# Patient Record
Sex: FEMALE | Race: WHITE | NOT HISPANIC OR LATINO | Employment: OTHER | URBAN - METROPOLITAN AREA
[De-identification: names, ages, dates, MRNs, and addresses within clinical notes are randomized per-mention and may not be internally consistent; named-entity substitution may affect disease eponyms.]

---

## 2017-01-02 ENCOUNTER — HOSPITAL ENCOUNTER (OUTPATIENT)
Dept: LAB | Facility: HOSPITAL | Age: 67
Discharge: HOME | End: 2017-01-02
Attending: ORTHOPAEDIC SURGERY | Admitting: ORTHOPAEDIC SURGERY

## 2017-01-02 LAB
BASOPHILS # BLD AUTO: 0.1 10*3/UL (ref 0–0.2)
BASOPHILS NFR BLD AUTO: 1 % (ref 0–2)
DIFFERENTIAL METHOD BLD: (no result)
EOSINOPHIL # BLD AUTO: 0.1 10*3/UL (ref 0–0.3)
EOSINOPHIL # BLD AUTO: 2 % (ref 0–3)
ERYTHROCYTE [DISTWIDTH] IN BLOOD BY AUTOMATED COUNT: 12.8 % (ref 11.5–14.5)
HCT VFR BLD AUTO: 39 % (ref 35–49)
HGB BLD-MCNC: 13 G/DL (ref 12–15)
LYMPHOCYTES # BLD AUTO: 1.3 10*3/UL (ref 0.8–4.8)
LYMPHOCYTES NFR BLD AUTO: 20 % (ref 18–42)
MCH RBC QN AUTO: 30.1 PG (ref 26–32)
MCHC RBC AUTO-ENTMCNC: 33.3 G/DL (ref 32–36)
MCV RBC AUTO: 90.4 FL (ref 80–94)
MONOCYTES # BLD AUTO: 0.7 10*3/UL (ref 0.1–1.3)
MONOCYTES NFR BLD AUTO: 11 % (ref 2–11)
NEUTROPHILS # BLD AUTO: 4.6 10*3/UL (ref 2.3–8.6)
NEUTROPHILS NFR BLD AUTO: 66 % (ref 50–75)
NRBC BLD AUTO-RTO: 0 /100{WBCS}
NRBC/RBC NFR BLD MANUAL: 0 10*3/UL
PLATELET # BLD AUTO: 265 10*3/UL (ref 150–450)
PMV BLD AUTO: 7.9 FL (ref 7.4–10.4)
RBC # BLD AUTO: 4.32 10*6/UL (ref 4–5.4)
WBC # BLD AUTO: 6.8 10*3/UL (ref 4.5–11.5)

## 2017-01-17 ENCOUNTER — HOSPITAL ENCOUNTER (OUTPATIENT)
Dept: ORTHOPEDIC SURGERY | Facility: CLINIC | Age: 67
Discharge: HOME | End: 2017-01-17
Attending: ORTHOPAEDIC SURGERY | Admitting: ORTHOPAEDIC SURGERY

## 2017-01-23 ENCOUNTER — HOSPITAL ENCOUNTER (OUTPATIENT)
Dept: PHYSICAL THERAPY | Facility: HOSPITAL | Age: 67
Setting detail: RECURRING SERIES
Discharge: HOME | End: 2017-02-14
Attending: ORTHOPAEDIC SURGERY | Admitting: ORTHOPAEDIC SURGERY

## 2017-02-10 ENCOUNTER — OFFICE (AMBULATORY)
Dept: URBAN - METROPOLITAN AREA CLINIC 64 | Facility: CLINIC | Age: 67
End: 2017-02-10

## 2017-02-10 VITALS
HEART RATE: 72 BPM | SYSTOLIC BLOOD PRESSURE: 131 MMHG | WEIGHT: 148 LBS | DIASTOLIC BLOOD PRESSURE: 67 MMHG | HEIGHT: 64 IN

## 2017-02-10 DIAGNOSIS — R19.5 OTHER FECAL ABNORMALITIES: ICD-10-CM

## 2017-02-10 PROCEDURE — 99213 OFFICE O/P EST LOW 20 MIN: CPT | Performed by: NURSE PRACTITIONER

## 2017-03-28 ENCOUNTER — HOSPITAL ENCOUNTER (OUTPATIENT)
Dept: CARDIOLOGY | Facility: HOSPITAL | Age: 67
Discharge: HOME | End: 2017-03-28
Attending: ORTHOPAEDIC SURGERY | Admitting: ORTHOPAEDIC SURGERY

## 2018-01-30 ENCOUNTER — HOSPITAL ENCOUNTER (OUTPATIENT)
Dept: ORTHOPEDIC SURGERY | Facility: CLINIC | Age: 68
Discharge: HOME | End: 2018-01-30
Attending: ORTHOPAEDIC SURGERY | Admitting: ORTHOPAEDIC SURGERY

## 2018-03-01 ENCOUNTER — OFFICE VISIT (OUTPATIENT)
Dept: ORTHOPEDIC SURGERY | Facility: CLINIC | Age: 68
End: 2018-03-01

## 2018-03-01 VITALS — WEIGHT: 153.4 LBS | BODY MASS INDEX: 27.18 KG/M2 | HEIGHT: 63 IN | TEMPERATURE: 97.9 F

## 2018-03-01 DIAGNOSIS — M25.562 CHRONIC PAIN OF LEFT KNEE: Primary | ICD-10-CM

## 2018-03-01 DIAGNOSIS — G89.29 CHRONIC PAIN OF LEFT KNEE: Primary | ICD-10-CM

## 2018-03-01 DIAGNOSIS — Z96.653 HISTORY OF TOTAL KNEE ARTHROPLASTY, BILATERAL: ICD-10-CM

## 2018-03-01 PROCEDURE — 99203 OFFICE O/P NEW LOW 30 MIN: CPT | Performed by: ORTHOPAEDIC SURGERY

## 2018-03-01 PROCEDURE — 73562 X-RAY EXAM OF KNEE 3: CPT | Performed by: ORTHOPAEDIC SURGERY

## 2018-03-01 RX ORDER — NICOTINE POLACRILEX 2 MG
GUM BUCCAL DAILY
COMMUNITY
End: 2021-03-02 | Stop reason: HOSPADM

## 2018-03-01 RX ORDER — PAROXETINE HYDROCHLORIDE 20 MG/1
20 TABLET, FILM COATED ORAL NIGHTLY
COMMUNITY

## 2018-03-01 RX ORDER — SIMVASTATIN 10 MG
20 TABLET ORAL NIGHTLY
Refills: 5 | Status: ON HOLD | COMMUNITY
Start: 2018-01-31 | End: 2022-01-01

## 2018-03-01 RX ORDER — IBUPROFEN 200 MG
200 TABLET ORAL EVERY 6 HOURS PRN
COMMUNITY
End: 2021-03-02 | Stop reason: HOSPADM

## 2018-03-01 RX ORDER — ZOLPIDEM TARTRATE 10 MG/1
10 TABLET ORAL NIGHTLY PRN
Refills: 3 | Status: ON HOLD | COMMUNITY
Start: 2018-02-07 | End: 2022-01-01

## 2018-03-01 NOTE — PROGRESS NOTES
"Patient: Natalie Yin  YOB: 1950 68 y.o. female  Medical Record Number: [id number]    Chief Complaints:   Chief Complaint   Patient presents with   • Left Knee - Pain, Establish Care       History of Present Illness:Natalie Yin is a 68 y.o. female who presents with Complaints of left knee pain.  She had a left total knee replacement performed by  in [facility].  This was done about 14 months ago.  Following surgery she had pain in that now persists 14 months later.  She states the alignment feel slightly off in comparison to her the other knee which was replaced 5 years ago.    Allergies: No Known Allergies    Medications:   Current Outpatient Prescriptions   Medication Sig Dispense Refill   • Biotin 1 MG capsule Take  by mouth.     • ibuprofen (ADVIL,MOTRIN) 200 MG tablet Take 200 mg by mouth Every 6 (Six) Hours As Needed for Mild Pain .     • PARoxetine (PAXIL) 10 MG tablet Take 10 mg by mouth Every Morning.     • simvastatin (ZOCOR) 10 MG tablet TK 1 T PO QD IN THE HELENE  5   • zolpidem (AMBIEN) 10 MG tablet TK 1 T PO QD HS  3     No current facility-administered medications for this visit.          The following portions of the patient's history were reviewed and updated as appropriate: allergies, current medications, past family history, past medical history, past social history, past surgical history and problem list.    Review of Systems:   A 14 point review of systems was performed. All systems negative except pertinent positives/negative listed in HPI above    Physical Exam:   Vitals:    03/01/18 1101   Temp: 97.9 °F (36.6 °C)   Weight: 69.6 kg (153 lb 6.4 oz)   Height: 160 cm (63\")       General: A and O x 3, ASA, NAD    SCLERA:    Normal    DENTITION:   Normal  Left knee shows a well-healed surgical incision she stands about 10° valgus alignment.  There is no joint effusion she has mild laxity with a varus directed stress in extension.  There is no evidence of flexion extension. "  The patella tracks midline.  Calf is soft she is intact to light touch with palpable distal pulses quad strength is full.       Radiology:  Xrays 3views left knee (ap,lateral, sunrise) were ordered and reviewed for evaluation of knee pain demonstrating a left total knee replacement appears to be in slight valgus alignment.  This is a standing AP view and I do not have access to a full length view to fully determine alignment.  She appears to have slight excess valgus alignment through the femoral component rather than the tibial component based on this limited view.  There is no evidence of loosening.  The components appear appropriately sized.  There are no previous films for comparison.    Assessment/Plan: Left total knee with slight excessive valgus alignment which is contributing to feelings of instability and discomfort.  I recommended a medial heel wedge.  I explained her that the absolute last resort would be revision like of the femoral component but at this point I don't think either of us are particularly interested in that.  I be happy to see her back at any point she should try the heel wedge and strengthen her quad  muscles as much as possible      Wing Ann MD  3/1/2018

## 2018-07-03 ENCOUNTER — HOSPITAL ENCOUNTER (OUTPATIENT)
Dept: ORTHOPEDIC SURGERY | Facility: CLINIC | Age: 68
Discharge: HOME | End: 2018-07-03
Attending: ORTHOPAEDIC SURGERY | Admitting: ORTHOPAEDIC SURGERY

## 2018-07-10 ENCOUNTER — HOSPITAL ENCOUNTER (OUTPATIENT)
Dept: OTHER | Facility: HOSPITAL | Age: 68
Discharge: HOME | End: 2018-07-10
Attending: ORTHOPAEDIC SURGERY | Admitting: ORTHOPAEDIC SURGERY

## 2018-07-10 LAB
CRP SERPL-MCNC: 0.43 MG/DL (ref 0–0.7)
ERYTHROCYTE [SEDIMENTATION RATE] IN BLOOD BY WESTERGREN METHOD: 25 MM/HR (ref 0–30)

## 2019-01-22 ENCOUNTER — HOSPITAL ENCOUNTER (OUTPATIENT)
Dept: LAB | Facility: HOSPITAL | Age: 69
Discharge: HOME | End: 2019-01-22
Attending: DERMATOLOGY | Admitting: DERMATOLOGY

## 2019-01-22 LAB
HCV AB SER DONR QL: NORMAL
HCV AB SER DONR QL: NORMAL

## 2020-10-29 ENCOUNTER — ON CAMPUS - OUTPATIENT (AMBULATORY)
Dept: URBAN - METROPOLITAN AREA HOSPITAL 2 | Facility: HOSPITAL | Age: 70
End: 2020-10-29

## 2020-10-29 ENCOUNTER — OFFICE (AMBULATORY)
Dept: URBAN - METROPOLITAN AREA PATHOLOGY 4 | Facility: PATHOLOGY | Age: 70
End: 2020-10-29

## 2020-10-29 VITALS
SYSTOLIC BLOOD PRESSURE: 134 MMHG | SYSTOLIC BLOOD PRESSURE: 136 MMHG | SYSTOLIC BLOOD PRESSURE: 118 MMHG | OXYGEN SATURATION: 97 % | WEIGHT: 148 LBS | DIASTOLIC BLOOD PRESSURE: 67 MMHG | HEART RATE: 87 BPM | OXYGEN SATURATION: 96 % | SYSTOLIC BLOOD PRESSURE: 129 MMHG | DIASTOLIC BLOOD PRESSURE: 104 MMHG | RESPIRATION RATE: 18 BRPM | DIASTOLIC BLOOD PRESSURE: 57 MMHG | HEART RATE: 63 BPM | TEMPERATURE: 97.1 F | OXYGEN SATURATION: 95 % | SYSTOLIC BLOOD PRESSURE: 80 MMHG | DIASTOLIC BLOOD PRESSURE: 56 MMHG | SYSTOLIC BLOOD PRESSURE: 107 MMHG | SYSTOLIC BLOOD PRESSURE: 151 MMHG | RESPIRATION RATE: 16 BRPM | HEART RATE: 74 BPM | HEART RATE: 67 BPM | SYSTOLIC BLOOD PRESSURE: 106 MMHG | DIASTOLIC BLOOD PRESSURE: 86 MMHG | HEIGHT: 64 IN | HEART RATE: 64 BPM | DIASTOLIC BLOOD PRESSURE: 41 MMHG | DIASTOLIC BLOOD PRESSURE: 53 MMHG | SYSTOLIC BLOOD PRESSURE: 117 MMHG | HEART RATE: 65 BPM | DIASTOLIC BLOOD PRESSURE: 55 MMHG | HEART RATE: 61 BPM | OXYGEN SATURATION: 100 %

## 2020-10-29 DIAGNOSIS — K57.30 DIVERTICULOSIS OF LARGE INTESTINE WITHOUT PERFORATION OR ABS: ICD-10-CM

## 2020-10-29 DIAGNOSIS — D12.4 BENIGN NEOPLASM OF DESCENDING COLON: ICD-10-CM

## 2020-10-29 DIAGNOSIS — R10.12 LEFT UPPER QUADRANT PAIN: ICD-10-CM

## 2020-10-29 DIAGNOSIS — K62.1 RECTAL POLYP: ICD-10-CM

## 2020-10-29 DIAGNOSIS — R13.19 OTHER DYSPHAGIA: ICD-10-CM

## 2020-10-29 DIAGNOSIS — Z86.010 PERSONAL HISTORY OF COLONIC POLYPS: ICD-10-CM

## 2020-10-29 DIAGNOSIS — K64.8 OTHER HEMORRHOIDS: ICD-10-CM

## 2020-10-29 DIAGNOSIS — K44.9 DIAPHRAGMATIC HERNIA WITHOUT OBSTRUCTION OR GANGRENE: ICD-10-CM

## 2020-10-29 PROBLEM — K63.5 POLYP OF COLON: Status: ACTIVE | Noted: 2020-10-29

## 2020-10-29 LAB
GI HISTOLOGY: A. UNSPECIFIED: (no result)
GI HISTOLOGY: B. UNSPECIFIED: (no result)
GI HISTOLOGY: PDF REPORT: (no result)

## 2020-10-29 PROCEDURE — 43450 DILATE ESOPHAGUS 1/MULT PASS: CPT | Performed by: INTERNAL MEDICINE

## 2020-10-29 PROCEDURE — 45380 COLONOSCOPY AND BIOPSY: CPT | Mod: PT | Performed by: INTERNAL MEDICINE

## 2020-10-29 PROCEDURE — 43235 EGD DIAGNOSTIC BRUSH WASH: CPT | Performed by: INTERNAL MEDICINE

## 2020-10-29 PROCEDURE — 88305 TISSUE EXAM BY PATHOLOGIST: CPT | Mod: 26 | Performed by: INTERNAL MEDICINE

## 2020-10-29 RX ORDER — OMEPRAZOLE 40 MG/1
40 CAPSULE, DELAYED RELEASE ORAL
Qty: 90 | Refills: 3 | Status: ACTIVE
Start: 2020-10-29

## 2021-01-01 ENCOUNTER — HOSPITAL ENCOUNTER (OUTPATIENT)
Facility: HOSPITAL | Age: 71
Setting detail: HOSPITAL OUTPATIENT SURGERY
Discharge: HOME | End: 2021-09-23
Attending: OPHTHALMOLOGY | Admitting: OPHTHALMOLOGY

## 2021-01-01 ENCOUNTER — ANESTHESIA (OUTPATIENT)
Dept: PERIOP | Facility: HOSPITAL | Age: 71
End: 2021-01-01

## 2021-01-01 ENCOUNTER — PRE-ADMISSION TESTING (OUTPATIENT)
Dept: PREADMISSION TESTING | Facility: HOSPITAL | Age: 71
End: 2021-01-01

## 2021-01-01 ENCOUNTER — ANESTHESIA EVENT (OUTPATIENT)
Dept: PERIOP | Facility: HOSPITAL | Age: 71
End: 2021-01-01

## 2021-01-01 VITALS
BODY MASS INDEX: 24.41 KG/M2 | HEIGHT: 64 IN | SYSTOLIC BLOOD PRESSURE: 165 MMHG | WEIGHT: 143 LBS | RESPIRATION RATE: 16 BRPM | TEMPERATURE: 97.4 F | DIASTOLIC BLOOD PRESSURE: 72 MMHG | OXYGEN SATURATION: 98 % | HEART RATE: 67 BPM

## 2021-01-01 VITALS
TEMPERATURE: 97.5 F | OXYGEN SATURATION: 97 % | DIASTOLIC BLOOD PRESSURE: 91 MMHG | RESPIRATION RATE: 18 BRPM | SYSTOLIC BLOOD PRESSURE: 154 MMHG | HEART RATE: 72 BPM

## 2021-01-01 LAB
ANION GAP SERPL CALCULATED.3IONS-SCNC: 10.6 MMOL/L (ref 5–15)
BUN SERPL-MCNC: 16 MG/DL (ref 8–23)
BUN/CREAT SERPL: 25.4 (ref 7–25)
CALCIUM SPEC-SCNC: 9.9 MG/DL (ref 8.6–10.5)
CHLORIDE SERPL-SCNC: 102 MMOL/L (ref 98–107)
CO2 SERPL-SCNC: 24.4 MMOL/L (ref 22–29)
CREAT SERPL-MCNC: 0.63 MG/DL (ref 0.57–1)
DEPRECATED RDW RBC AUTO: 41.1 FL (ref 37–54)
ERYTHROCYTE [DISTWIDTH] IN BLOOD BY AUTOMATED COUNT: 12.7 % (ref 12.3–15.4)
GFR SERPL CREATININE-BSD FRML MDRD: 94 ML/MIN/1.73
GLUCOSE SERPL-MCNC: 105 MG/DL (ref 65–99)
HCT VFR BLD AUTO: 37 % (ref 34–46.6)
HGB BLD-MCNC: 12.1 G/DL (ref 12–15.9)
MCH RBC QN AUTO: 29.2 PG (ref 26.6–33)
MCHC RBC AUTO-ENTMCNC: 32.7 G/DL (ref 31.5–35.7)
MCV RBC AUTO: 89.2 FL (ref 79–97)
PLATELET # BLD AUTO: 273 10*3/MM3 (ref 140–450)
PMV BLD AUTO: 9.8 FL (ref 6–12)
POTASSIUM SERPL-SCNC: 4.3 MMOL/L (ref 3.5–5.2)
QT INTERVAL: 441 MS
RBC # BLD AUTO: 4.15 10*6/MM3 (ref 3.77–5.28)
SARS-COV-2 ORF1AB RESP QL NAA+PROBE: NOT DETECTED
SODIUM SERPL-SCNC: 137 MMOL/L (ref 136–145)
WBC # BLD AUTO: 5.53 10*3/MM3 (ref 3.4–10.8)

## 2021-01-01 PROCEDURE — C9803 HOPD COVID-19 SPEC COLLECT: HCPCS | Performed by: NURSE PRACTITIONER

## 2021-01-01 PROCEDURE — U0005 INFEC AGEN DETEC AMPLI PROBE: HCPCS | Performed by: NURSE PRACTITIONER

## 2021-01-01 PROCEDURE — 25010000002 FENTANYL CITRATE (PF) 50 MCG/ML SOLUTION: Performed by: NURSE ANESTHETIST, CERTIFIED REGISTERED

## 2021-01-01 PROCEDURE — 85027 COMPLETE CBC AUTOMATED: CPT

## 2021-01-01 PROCEDURE — 25010000002 ONDANSETRON PER 1 MG: Performed by: ANESTHESIOLOGY

## 2021-01-01 PROCEDURE — 93010 ELECTROCARDIOGRAM REPORT: CPT | Performed by: INTERNAL MEDICINE

## 2021-01-01 PROCEDURE — U0004 COV-19 TEST NON-CDC HGH THRU: HCPCS | Performed by: NURSE PRACTITIONER

## 2021-01-01 PROCEDURE — 80048 BASIC METABOLIC PNL TOTAL CA: CPT

## 2021-01-01 PROCEDURE — 25010000002 PHENYLEPHRINE PER 1 ML: Performed by: NURSE ANESTHETIST, CERTIFIED REGISTERED

## 2021-01-01 PROCEDURE — 25010000002 ONDANSETRON PER 1 MG: Performed by: NURSE ANESTHETIST, CERTIFIED REGISTERED

## 2021-01-01 PROCEDURE — 25010000002 DEXAMETHASONE PER 1 MG: Performed by: NURSE ANESTHETIST, CERTIFIED REGISTERED

## 2021-01-01 PROCEDURE — 93005 ELECTROCARDIOGRAM TRACING: CPT

## 2021-01-01 PROCEDURE — 36415 COLL VENOUS BLD VENIPUNCTURE: CPT

## 2021-01-01 PROCEDURE — 25010000002 PROPOFOL 10 MG/ML EMULSION: Performed by: NURSE ANESTHETIST, CERTIFIED REGISTERED

## 2021-01-01 RX ORDER — PROPOFOL 10 MG/ML
VIAL (ML) INTRAVENOUS AS NEEDED
Status: DISCONTINUED | OUTPATIENT
Start: 2021-01-01 | End: 2021-01-01 | Stop reason: SURG

## 2021-01-01 RX ORDER — LABETALOL HYDROCHLORIDE 5 MG/ML
5 INJECTION, SOLUTION INTRAVENOUS
Status: DISCONTINUED | OUTPATIENT
Start: 2021-01-01 | End: 2021-01-01 | Stop reason: HOSPADM

## 2021-01-01 RX ORDER — EPHEDRINE SULFATE 50 MG/ML
5 INJECTION, SOLUTION INTRAVENOUS ONCE AS NEEDED
Status: DISCONTINUED | OUTPATIENT
Start: 2021-01-01 | End: 2021-01-01 | Stop reason: HOSPADM

## 2021-01-01 RX ORDER — PROMETHAZINE HYDROCHLORIDE 25 MG/1
25 TABLET ORAL ONCE AS NEEDED
Status: DISCONTINUED | OUTPATIENT
Start: 2021-01-01 | End: 2021-01-01 | Stop reason: HOSPADM

## 2021-01-01 RX ORDER — ACETAMINOPHEN 325 MG/1
650 TABLET ORAL ONCE AS NEEDED
Status: DISCONTINUED | OUTPATIENT
Start: 2021-01-01 | End: 2021-01-01 | Stop reason: HOSPADM

## 2021-01-01 RX ORDER — ONDANSETRON 2 MG/ML
4 INJECTION INTRAMUSCULAR; INTRAVENOUS ONCE AS NEEDED
Status: DISCONTINUED | OUTPATIENT
Start: 2021-01-01 | End: 2021-01-01 | Stop reason: HOSPADM

## 2021-01-01 RX ORDER — LIDOCAINE HYDROCHLORIDE 10 MG/ML
0.5 INJECTION, SOLUTION EPIDURAL; INFILTRATION; INTRACAUDAL; PERINEURAL ONCE AS NEEDED
Status: DISCONTINUED | OUTPATIENT
Start: 2021-01-01 | End: 2021-01-01 | Stop reason: HOSPADM

## 2021-01-01 RX ORDER — SODIUM CHLORIDE 0.9 % (FLUSH) 0.9 %
3 SYRINGE (ML) INJECTION EVERY 12 HOURS SCHEDULED
Status: DISCONTINUED | OUTPATIENT
Start: 2021-01-01 | End: 2021-01-01 | Stop reason: HOSPADM

## 2021-01-01 RX ORDER — ONDANSETRON 2 MG/ML
INJECTION INTRAMUSCULAR; INTRAVENOUS AS NEEDED
Status: DISCONTINUED | OUTPATIENT
Start: 2021-01-01 | End: 2021-01-01 | Stop reason: SURG

## 2021-01-01 RX ORDER — HYDROCODONE BITARTRATE AND ACETAMINOPHEN 7.5; 325 MG/1; MG/1
1 TABLET ORAL ONCE AS NEEDED
Status: COMPLETED | OUTPATIENT
Start: 2021-01-01 | End: 2021-01-01

## 2021-01-01 RX ORDER — MAGNESIUM HYDROXIDE 1200 MG/15ML
LIQUID ORAL AS NEEDED
Status: DISCONTINUED | OUTPATIENT
Start: 2021-01-01 | End: 2021-01-01 | Stop reason: HOSPADM

## 2021-01-01 RX ORDER — SODIUM CHLORIDE, SODIUM LACTATE, POTASSIUM CHLORIDE, CALCIUM CHLORIDE 600; 310; 30; 20 MG/100ML; MG/100ML; MG/100ML; MG/100ML
9 INJECTION, SOLUTION INTRAVENOUS CONTINUOUS
Status: DISCONTINUED | OUTPATIENT
Start: 2021-01-01 | End: 2021-01-01 | Stop reason: HOSPADM

## 2021-01-01 RX ORDER — DIPHENHYDRAMINE HYDROCHLORIDE 50 MG/ML
12.5 INJECTION INTRAMUSCULAR; INTRAVENOUS
Status: DISCONTINUED | OUTPATIENT
Start: 2021-01-01 | End: 2021-01-01 | Stop reason: HOSPADM

## 2021-01-01 RX ORDER — DEXAMETHASONE SODIUM PHOSPHATE 10 MG/ML
INJECTION INTRAMUSCULAR; INTRAVENOUS AS NEEDED
Status: DISCONTINUED | OUTPATIENT
Start: 2021-01-01 | End: 2021-01-01 | Stop reason: SURG

## 2021-01-01 RX ORDER — ERYTHROMYCIN 5 MG/G
OINTMENT OPHTHALMIC 2 TIMES DAILY
Qty: 3.5 G | Refills: 1 | Status: ON HOLD | OUTPATIENT
Start: 2021-01-01 | End: 2022-01-01

## 2021-01-01 RX ORDER — SODIUM CHLORIDE 0.9 % (FLUSH) 0.9 %
3-10 SYRINGE (ML) INJECTION AS NEEDED
Status: DISCONTINUED | OUTPATIENT
Start: 2021-01-01 | End: 2021-01-01 | Stop reason: HOSPADM

## 2021-01-01 RX ORDER — HYDRALAZINE HYDROCHLORIDE 20 MG/ML
5 INJECTION INTRAMUSCULAR; INTRAVENOUS
Status: DISCONTINUED | OUTPATIENT
Start: 2021-01-01 | End: 2021-01-01 | Stop reason: HOSPADM

## 2021-01-01 RX ORDER — HYDROMORPHONE HYDROCHLORIDE 1 MG/ML
0.5 INJECTION, SOLUTION INTRAMUSCULAR; INTRAVENOUS; SUBCUTANEOUS
Status: DISCONTINUED | OUTPATIENT
Start: 2021-01-01 | End: 2021-01-01 | Stop reason: HOSPADM

## 2021-01-01 RX ORDER — NALOXONE HCL 0.4 MG/ML
0.2 VIAL (ML) INJECTION AS NEEDED
Status: DISCONTINUED | OUTPATIENT
Start: 2021-01-01 | End: 2021-01-01 | Stop reason: HOSPADM

## 2021-01-01 RX ORDER — ERYTHROMYCIN 5 MG/G
OINTMENT OPHTHALMIC AS NEEDED
Status: DISCONTINUED | OUTPATIENT
Start: 2021-01-01 | End: 2021-01-01 | Stop reason: HOSPADM

## 2021-01-01 RX ORDER — HYDROCODONE BITARTRATE AND ACETAMINOPHEN 5; 325 MG/1; MG/1
1 TABLET ORAL EVERY 6 HOURS PRN
Qty: 15 TABLET | Refills: 0 | Status: SHIPPED | OUTPATIENT
Start: 2021-01-01 | End: 2021-01-01

## 2021-01-01 RX ORDER — FENTANYL CITRATE 50 UG/ML
INJECTION, SOLUTION INTRAMUSCULAR; INTRAVENOUS AS NEEDED
Status: DISCONTINUED | OUTPATIENT
Start: 2021-01-01 | End: 2021-01-01 | Stop reason: SURG

## 2021-01-01 RX ORDER — OXYCODONE AND ACETAMINOPHEN 10; 325 MG/1; MG/1
1 TABLET ORAL EVERY 4 HOURS PRN
Status: DISCONTINUED | OUTPATIENT
Start: 2021-01-01 | End: 2021-01-01 | Stop reason: HOSPADM

## 2021-01-01 RX ORDER — EPHEDRINE SULFATE 50 MG/ML
INJECTION, SOLUTION INTRAVENOUS AS NEEDED
Status: DISCONTINUED | OUTPATIENT
Start: 2021-01-01 | End: 2021-01-01 | Stop reason: SURG

## 2021-01-01 RX ORDER — PROMETHAZINE HYDROCHLORIDE 25 MG/1
25 SUPPOSITORY RECTAL ONCE AS NEEDED
Status: DISCONTINUED | OUTPATIENT
Start: 2021-01-01 | End: 2021-01-01 | Stop reason: HOSPADM

## 2021-01-01 RX ORDER — FLUMAZENIL 0.1 MG/ML
0.2 INJECTION INTRAVENOUS AS NEEDED
Status: DISCONTINUED | OUTPATIENT
Start: 2021-01-01 | End: 2021-01-01 | Stop reason: HOSPADM

## 2021-01-01 RX ORDER — FAMOTIDINE 10 MG/ML
20 INJECTION, SOLUTION INTRAVENOUS ONCE
Status: COMPLETED | OUTPATIENT
Start: 2021-01-01 | End: 2021-01-01

## 2021-01-01 RX ORDER — ONDANSETRON 4 MG/1
4 TABLET, FILM COATED ORAL EVERY 8 HOURS PRN
Qty: 10 TABLET | Refills: 0 | Status: ON HOLD | OUTPATIENT
Start: 2021-01-01 | End: 2022-01-01

## 2021-01-01 RX ORDER — LIDOCAINE HYDROCHLORIDE 20 MG/ML
INJECTION, SOLUTION INFILTRATION; PERINEURAL AS NEEDED
Status: DISCONTINUED | OUTPATIENT
Start: 2021-01-01 | End: 2021-01-01 | Stop reason: SURG

## 2021-01-01 RX ORDER — FENTANYL CITRATE 50 UG/ML
50 INJECTION, SOLUTION INTRAMUSCULAR; INTRAVENOUS
Status: DISCONTINUED | OUTPATIENT
Start: 2021-01-01 | End: 2021-01-01 | Stop reason: HOSPADM

## 2021-01-01 RX ORDER — GLYCOPYRROLATE 0.2 MG/ML
INJECTION INTRAMUSCULAR; INTRAVENOUS AS NEEDED
Status: DISCONTINUED | OUTPATIENT
Start: 2021-01-01 | End: 2021-01-01 | Stop reason: SURG

## 2021-01-01 RX ORDER — DIPHENHYDRAMINE HCL 25 MG
25 CAPSULE ORAL
Status: DISCONTINUED | OUTPATIENT
Start: 2021-01-01 | End: 2021-01-01 | Stop reason: HOSPADM

## 2021-01-01 RX ORDER — ACETAMINOPHEN 650 MG/1
650 SUPPOSITORY RECTAL ONCE AS NEEDED
Status: DISCONTINUED | OUTPATIENT
Start: 2021-01-01 | End: 2021-01-01 | Stop reason: HOSPADM

## 2021-01-01 RX ORDER — ONDANSETRON 2 MG/ML
4 INJECTION INTRAMUSCULAR; INTRAVENOUS ONCE AS NEEDED
Status: COMPLETED | OUTPATIENT
Start: 2021-01-01 | End: 2021-01-01

## 2021-01-01 RX ADMIN — SODIUM CHLORIDE, POTASSIUM CHLORIDE, SODIUM LACTATE AND CALCIUM CHLORIDE 9 ML/HR: 600; 310; 30; 20 INJECTION, SOLUTION INTRAVENOUS at 07:42

## 2021-01-01 RX ADMIN — ONDANSETRON 4 MG: 2 INJECTION INTRAMUSCULAR; INTRAVENOUS at 09:02

## 2021-01-01 RX ADMIN — PROPOFOL 50 MCG/KG/MIN: 10 INJECTION, EMULSION INTRAVENOUS at 08:18

## 2021-01-01 RX ADMIN — PHENYLEPHRINE HYDROCHLORIDE 100 MCG: 10 INJECTION INTRAVENOUS at 08:42

## 2021-01-01 RX ADMIN — PROPOFOL 120 MG: 10 INJECTION, EMULSION INTRAVENOUS at 08:12

## 2021-01-01 RX ADMIN — PHENYLEPHRINE HYDROCHLORIDE 100 MCG: 10 INJECTION INTRAVENOUS at 08:27

## 2021-01-01 RX ADMIN — EPHEDRINE SULFATE 10 MG: 50 INJECTION INTRAVENOUS at 08:52

## 2021-01-01 RX ADMIN — PHENYLEPHRINE HYDROCHLORIDE 100 MCG: 10 INJECTION INTRAVENOUS at 08:34

## 2021-01-01 RX ADMIN — DEXAMETHASONE SODIUM PHOSPHATE 10 MG: 10 INJECTION INTRAMUSCULAR; INTRAVENOUS at 08:18

## 2021-01-01 RX ADMIN — FENTANYL CITRATE 50 MCG: 50 INJECTION INTRAMUSCULAR; INTRAVENOUS at 08:17

## 2021-01-01 RX ADMIN — FAMOTIDINE 20 MG: 10 INJECTION INTRAVENOUS at 07:48

## 2021-01-01 RX ADMIN — FENTANYL CITRATE 50 MCG: 0.05 INJECTION, SOLUTION INTRAMUSCULAR; INTRAVENOUS at 09:35

## 2021-01-01 RX ADMIN — LIDOCAINE HYDROCHLORIDE 60 MG: 20 INJECTION, SOLUTION INFILTRATION; PERINEURAL at 08:12

## 2021-01-01 RX ADMIN — HYDROCODONE BITARTRATE AND ACETAMINOPHEN 1 TABLET: 7.5; 325 TABLET ORAL at 09:44

## 2021-01-01 RX ADMIN — FENTANYL CITRATE 50 MCG: 50 INJECTION INTRAMUSCULAR; INTRAVENOUS at 08:52

## 2021-01-01 RX ADMIN — ONDANSETRON 4 MG: 2 INJECTION INTRAMUSCULAR; INTRAVENOUS at 07:48

## 2021-01-01 RX ADMIN — FENTANYL CITRATE 50 MCG: 0.05 INJECTION, SOLUTION INTRAMUSCULAR; INTRAVENOUS at 09:45

## 2021-01-01 RX ADMIN — GLYCOPYRROLATE 0.2 MG: 0.2 INJECTION INTRAMUSCULAR; INTRAVENOUS at 08:42

## 2021-02-22 ENCOUNTER — TRANSCRIBE ORDERS (OUTPATIENT)
Dept: PREADMISSION TESTING | Facility: HOSPITAL | Age: 71
End: 2021-02-22

## 2021-02-22 DIAGNOSIS — Z01.818 OTHER SPECIFIED PRE-OPERATIVE EXAMINATION: Primary | ICD-10-CM

## 2021-02-24 ENCOUNTER — APPOINTMENT (OUTPATIENT)
Dept: PREADMISSION TESTING | Facility: HOSPITAL | Age: 71
End: 2021-02-24

## 2021-02-24 VITALS
HEIGHT: 64 IN | HEART RATE: 65 BPM | DIASTOLIC BLOOD PRESSURE: 70 MMHG | RESPIRATION RATE: 16 BRPM | TEMPERATURE: 98.4 F | WEIGHT: 153.8 LBS | OXYGEN SATURATION: 96 % | BODY MASS INDEX: 26.26 KG/M2 | SYSTOLIC BLOOD PRESSURE: 181 MMHG

## 2021-02-24 LAB
ANION GAP SERPL CALCULATED.3IONS-SCNC: 9.1 MMOL/L (ref 5–15)
BUN SERPL-MCNC: 12 MG/DL (ref 8–23)
BUN/CREAT SERPL: 19.4 (ref 7–25)
CALCIUM SPEC-SCNC: 9.2 MG/DL (ref 8.6–10.5)
CHLORIDE SERPL-SCNC: 103 MMOL/L (ref 98–107)
CO2 SERPL-SCNC: 26.9 MMOL/L (ref 22–29)
CREAT SERPL-MCNC: 0.62 MG/DL (ref 0.57–1)
DEPRECATED RDW RBC AUTO: 43.2 FL (ref 37–54)
ERYTHROCYTE [DISTWIDTH] IN BLOOD BY AUTOMATED COUNT: 12.7 % (ref 12.3–15.4)
GFR SERPL CREATININE-BSD FRML MDRD: 95 ML/MIN/1.73
GLUCOSE SERPL-MCNC: 85 MG/DL (ref 65–99)
HCT VFR BLD AUTO: 36.9 % (ref 34–46.6)
HGB BLD-MCNC: 11.6 G/DL (ref 12–15.9)
MCH RBC QN AUTO: 28.8 PG (ref 26.6–33)
MCHC RBC AUTO-ENTMCNC: 31.4 G/DL (ref 31.5–35.7)
MCV RBC AUTO: 91.6 FL (ref 79–97)
PLATELET # BLD AUTO: 254 10*3/MM3 (ref 140–450)
PMV BLD AUTO: 10.1 FL (ref 6–12)
POTASSIUM SERPL-SCNC: 4.3 MMOL/L (ref 3.5–5.2)
QT INTERVAL: 432 MS
RBC # BLD AUTO: 4.03 10*6/MM3 (ref 3.77–5.28)
SODIUM SERPL-SCNC: 139 MMOL/L (ref 136–145)
WBC # BLD AUTO: 7.1 10*3/MM3 (ref 3.4–10.8)

## 2021-02-24 PROCEDURE — 85027 COMPLETE CBC AUTOMATED: CPT

## 2021-02-24 PROCEDURE — 36415 COLL VENOUS BLD VENIPUNCTURE: CPT

## 2021-02-24 PROCEDURE — 80048 BASIC METABOLIC PNL TOTAL CA: CPT

## 2021-02-24 PROCEDURE — 93010 ELECTROCARDIOGRAM REPORT: CPT | Performed by: INTERNAL MEDICINE

## 2021-02-24 PROCEDURE — 93005 ELECTROCARDIOGRAM TRACING: CPT

## 2021-02-24 RX ORDER — OMEPRAZOLE 20 MG/1
20 CAPSULE, DELAYED RELEASE ORAL DAILY
COMMUNITY

## 2021-02-27 ENCOUNTER — LAB (OUTPATIENT)
Dept: LAB | Facility: HOSPITAL | Age: 71
End: 2021-02-27

## 2021-02-27 DIAGNOSIS — Z01.818 OTHER SPECIFIED PRE-OPERATIVE EXAMINATION: ICD-10-CM

## 2021-02-27 PROCEDURE — C9803 HOPD COVID-19 SPEC COLLECT: HCPCS

## 2021-02-27 PROCEDURE — U0005 INFEC AGEN DETEC AMPLI PROBE: HCPCS

## 2021-02-27 PROCEDURE — U0004 COV-19 TEST NON-CDC HGH THRU: HCPCS

## 2021-03-01 LAB — SARS-COV-2 RNA RESP QL NAA+PROBE: NOT DETECTED

## 2021-03-02 ENCOUNTER — HOSPITAL ENCOUNTER (OUTPATIENT)
Facility: HOSPITAL | Age: 71
Setting detail: HOSPITAL OUTPATIENT SURGERY
Discharge: HOME | End: 2021-03-02
Attending: OPHTHALMOLOGY | Admitting: OPHTHALMOLOGY

## 2021-03-02 ENCOUNTER — ANESTHESIA EVENT (OUTPATIENT)
Dept: PERIOP | Facility: HOSPITAL | Age: 71
End: 2021-03-02

## 2021-03-02 ENCOUNTER — ANESTHESIA (OUTPATIENT)
Dept: PERIOP | Facility: HOSPITAL | Age: 71
End: 2021-03-02

## 2021-03-02 VITALS
DIASTOLIC BLOOD PRESSURE: 73 MMHG | HEART RATE: 63 BPM | RESPIRATION RATE: 18 BRPM | OXYGEN SATURATION: 94 % | SYSTOLIC BLOOD PRESSURE: 165 MMHG | TEMPERATURE: 97.7 F

## 2021-03-02 PROCEDURE — 25010000002 MIDAZOLAM PER 1 MG: Performed by: ANESTHESIOLOGY

## 2021-03-02 PROCEDURE — 25010000002 ONDANSETRON PER 1 MG: Performed by: NURSE ANESTHETIST, CERTIFIED REGISTERED

## 2021-03-02 PROCEDURE — 25010000002 PROPOFOL 10 MG/ML EMULSION: Performed by: NURSE ANESTHETIST, CERTIFIED REGISTERED

## 2021-03-02 PROCEDURE — 25010000002 DEXAMETHASONE PER 1 MG: Performed by: NURSE ANESTHETIST, CERTIFIED REGISTERED

## 2021-03-02 RX ORDER — FENTANYL CITRATE 50 UG/ML
50 INJECTION, SOLUTION INTRAMUSCULAR; INTRAVENOUS
Status: DISCONTINUED | OUTPATIENT
Start: 2021-03-02 | End: 2021-03-02 | Stop reason: HOSPADM

## 2021-03-02 RX ORDER — EPHEDRINE SULFATE 50 MG/ML
5 INJECTION, SOLUTION INTRAVENOUS ONCE AS NEEDED
Status: DISCONTINUED | OUTPATIENT
Start: 2021-03-02 | End: 2021-03-02 | Stop reason: HOSPADM

## 2021-03-02 RX ORDER — ERYTHROMYCIN 5 MG/G
OINTMENT OPHTHALMIC 2 TIMES DAILY
Qty: 3.5 G | Refills: 1 | Status: ON HOLD | OUTPATIENT
Start: 2021-03-02 | End: 2021-01-01

## 2021-03-02 RX ORDER — SODIUM CHLORIDE 0.9 % (FLUSH) 0.9 %
3 SYRINGE (ML) INJECTION EVERY 12 HOURS SCHEDULED
Status: DISCONTINUED | OUTPATIENT
Start: 2021-03-02 | End: 2021-03-02 | Stop reason: HOSPADM

## 2021-03-02 RX ORDER — MAGNESIUM HYDROXIDE 1200 MG/15ML
LIQUID ORAL AS NEEDED
Status: DISCONTINUED | OUTPATIENT
Start: 2021-03-02 | End: 2021-03-02 | Stop reason: HOSPADM

## 2021-03-02 RX ORDER — ONDANSETRON 2 MG/ML
INJECTION INTRAMUSCULAR; INTRAVENOUS AS NEEDED
Status: DISCONTINUED | OUTPATIENT
Start: 2021-03-02 | End: 2021-03-02 | Stop reason: SURG

## 2021-03-02 RX ORDER — ONDANSETRON 2 MG/ML
4 INJECTION INTRAMUSCULAR; INTRAVENOUS ONCE AS NEEDED
Status: DISCONTINUED | OUTPATIENT
Start: 2021-03-02 | End: 2021-03-02 | Stop reason: HOSPADM

## 2021-03-02 RX ORDER — MIDAZOLAM HYDROCHLORIDE 1 MG/ML
1 INJECTION INTRAMUSCULAR; INTRAVENOUS
Status: DISCONTINUED | OUTPATIENT
Start: 2021-03-02 | End: 2021-03-02 | Stop reason: HOSPADM

## 2021-03-02 RX ORDER — FENTANYL CITRATE 50 UG/ML
100 INJECTION, SOLUTION INTRAMUSCULAR; INTRAVENOUS
Status: DISCONTINUED | OUTPATIENT
Start: 2021-03-02 | End: 2021-03-02 | Stop reason: HOSPADM

## 2021-03-02 RX ORDER — SODIUM CHLORIDE, SODIUM LACTATE, POTASSIUM CHLORIDE, CALCIUM CHLORIDE 600; 310; 30; 20 MG/100ML; MG/100ML; MG/100ML; MG/100ML
9 INJECTION, SOLUTION INTRAVENOUS CONTINUOUS
Status: DISCONTINUED | OUTPATIENT
Start: 2021-03-02 | End: 2021-03-02 | Stop reason: HOSPADM

## 2021-03-02 RX ORDER — FLUMAZENIL 0.1 MG/ML
0.2 INJECTION INTRAVENOUS AS NEEDED
Status: DISCONTINUED | OUTPATIENT
Start: 2021-03-02 | End: 2021-03-02 | Stop reason: HOSPADM

## 2021-03-02 RX ORDER — MIDAZOLAM HYDROCHLORIDE 1 MG/ML
0.5 INJECTION INTRAMUSCULAR; INTRAVENOUS
Status: DISCONTINUED | OUTPATIENT
Start: 2021-03-02 | End: 2021-03-02 | Stop reason: HOSPADM

## 2021-03-02 RX ORDER — SODIUM CHLORIDE 0.9 % (FLUSH) 0.9 %
3-10 SYRINGE (ML) INJECTION AS NEEDED
Status: DISCONTINUED | OUTPATIENT
Start: 2021-03-02 | End: 2021-03-02 | Stop reason: HOSPADM

## 2021-03-02 RX ORDER — DEXAMETHASONE SODIUM PHOSPHATE 10 MG/ML
INJECTION INTRAMUSCULAR; INTRAVENOUS AS NEEDED
Status: DISCONTINUED | OUTPATIENT
Start: 2021-03-02 | End: 2021-03-02 | Stop reason: SURG

## 2021-03-02 RX ORDER — LIDOCAINE HYDROCHLORIDE 10 MG/ML
0.5 INJECTION, SOLUTION EPIDURAL; INFILTRATION; INTRACAUDAL; PERINEURAL ONCE AS NEEDED
Status: DISCONTINUED | OUTPATIENT
Start: 2021-03-02 | End: 2021-03-02 | Stop reason: HOSPADM

## 2021-03-02 RX ORDER — HYDROCODONE BITARTRATE AND ACETAMINOPHEN 7.5; 325 MG/1; MG/1
1 TABLET ORAL ONCE AS NEEDED
Status: COMPLETED | OUTPATIENT
Start: 2021-03-02 | End: 2021-03-02

## 2021-03-02 RX ORDER — ERYTHROMYCIN 5 MG/G
OINTMENT OPHTHALMIC AS NEEDED
Status: DISCONTINUED | OUTPATIENT
Start: 2021-03-02 | End: 2021-03-02 | Stop reason: HOSPADM

## 2021-03-02 RX ORDER — LIDOCAINE HYDROCHLORIDE 20 MG/ML
INJECTION, SOLUTION INFILTRATION; PERINEURAL AS NEEDED
Status: DISCONTINUED | OUTPATIENT
Start: 2021-03-02 | End: 2021-03-02 | Stop reason: SURG

## 2021-03-02 RX ORDER — HYDROMORPHONE HYDROCHLORIDE 1 MG/ML
0.5 INJECTION, SOLUTION INTRAMUSCULAR; INTRAVENOUS; SUBCUTANEOUS
Status: DISCONTINUED | OUTPATIENT
Start: 2021-03-02 | End: 2021-03-02 | Stop reason: HOSPADM

## 2021-03-02 RX ORDER — OXYCODONE AND ACETAMINOPHEN 7.5; 325 MG/1; MG/1
1 TABLET ORAL ONCE AS NEEDED
Status: DISCONTINUED | OUTPATIENT
Start: 2021-03-02 | End: 2021-03-02 | Stop reason: HOSPADM

## 2021-03-02 RX ORDER — PROPOFOL 10 MG/ML
VIAL (ML) INTRAVENOUS AS NEEDED
Status: DISCONTINUED | OUTPATIENT
Start: 2021-03-02 | End: 2021-03-02 | Stop reason: SURG

## 2021-03-02 RX ORDER — HYDROCODONE BITARTRATE AND ACETAMINOPHEN 5; 325 MG/1; MG/1
1 TABLET ORAL EVERY 6 HOURS PRN
Qty: 15 TABLET | Refills: 0 | Status: ON HOLD | OUTPATIENT
Start: 2021-03-02 | End: 2021-01-01

## 2021-03-02 RX ADMIN — HYDROCODONE BITARTRATE AND ACETAMINOPHEN 1 TABLET: 7.5; 325 TABLET ORAL at 14:00

## 2021-03-02 RX ADMIN — LIDOCAINE HYDROCHLORIDE 60 MG: 20 INJECTION, SOLUTION INFILTRATION; PERINEURAL at 12:24

## 2021-03-02 RX ADMIN — SODIUM CHLORIDE, POTASSIUM CHLORIDE, SODIUM LACTATE AND CALCIUM CHLORIDE 9 ML/HR: 600; 310; 30; 20 INJECTION, SOLUTION INTRAVENOUS at 10:11

## 2021-03-02 RX ADMIN — ONDANSETRON 4 MG: 2 INJECTION INTRAMUSCULAR; INTRAVENOUS at 12:40

## 2021-03-02 RX ADMIN — MIDAZOLAM 1 MG: 1 INJECTION INTRAMUSCULAR; INTRAVENOUS at 10:43

## 2021-03-02 RX ADMIN — PROPOFOL 200 MG: 10 INJECTION, EMULSION INTRAVENOUS at 12:24

## 2021-03-02 RX ADMIN — DEXAMETHASONE SODIUM PHOSPHATE 6 MG: 10 INJECTION INTRAMUSCULAR; INTRAVENOUS at 12:40

## 2021-03-02 NOTE — ANESTHESIA POSTPROCEDURE EVALUATION
Patient: Natalie Yin    Procedure Summary     Date: 03/02/21 Room / Location:  [facility]  [facility]    Anesthesia Start: 1218 Anesthesia Stop: 1321    Procedure: BILATERAL UPPER LID BLEPHAROPLASY (Bilateral Eye) Diagnosis:     Surgeon: Stanley Fulton MD Provider: Garett Lam MD    Anesthesia Type: MAC ASA Status: 2          Anesthesia Type: MAC    Vitals  Vitals Value Taken Time   /70 03/02/21 1400   Temp 36.5 °C (97.7 °F) 03/02/21 1400   Pulse 61 03/02/21 1414   Resp 16 03/02/21 1400   SpO2 94 % 03/02/21 1416   Vitals shown include unvalidated device data.        Post Anesthesia Care and Evaluation    Patient location during evaluation: bedside  Patient participation: complete - patient participated  Level of consciousness: awake  Pain management: adequate  Airway patency: patent  Anesthetic complications: No anesthetic complications  PONV Status: controlled  Cardiovascular status: acceptable  Respiratory status: acceptable  Hydration status: acceptable    Comments: --------------------            03/02/21               1419     --------------------   BP:       165/73     Pulse:      63       Resp:       18       Temp:                SpO2:      94%      --------------------

## 2021-03-02 NOTE — H&P
History & Physical       Patient: Natalie Yin    Date of Admission: No admission date for patient encounter.    YOB: 1950    Medical Record Number: [id number]      Chief Complaints: droopy eyelids obstructing vision      History of Present Illness: 71 y.o. female presents with bul dermatochalasis. No new meds/health problems since office visit      Allergies: No Known Allergies    10 point review of systems negative, except pertaining to the HPI    Medications:   Home Medications:  No current facility-administered medications on file prior to encounter.      Current Outpatient Medications on File Prior to Encounter   Medication Sig   • Biotin 1 MG capsule Take  by mouth Daily. PT TO STOP PER MD INSTRUCTION   • ibuprofen (ADVIL,MOTRIN) 200 MG tablet Take 200 mg by mouth Every 6 (Six) Hours As Needed for Mild Pain . PT TO STOP PER MD INSTRUCTION   • PARoxetine (PAXIL) 10 MG tablet Take 10 mg by mouth Every Night.   • simvastatin (ZOCOR) 10 MG tablet Take 10 mg by mouth Daily.   • zolpidem (AMBIEN) 10 MG tablet Take 10 mg by mouth At Night As Needed.     Current Medications:  Scheduled Meds:  Continuous Infusions:No current facility-administered medications for this encounter.     PRN Meds:.    Past Medical History:   Diagnosis Date   • Anxiety and depression    • Arthritis    • DDD (degenerative disc disease), lumbar    • Diverticulosis    • GERD (gastroesophageal reflux disease)    • PONV (postoperative nausea and vomiting)    • Ptosis due to aging         Past Surgical History:   Procedure Laterality Date   • BUNIONECTOMY     • COLONOSCOPY     • ENDOSCOPY     • HYSTERECTOMY     • KNEE ARTHROPLASTY Bilateral    • LUMBAR DISC SURGERY     • TONSILLECTOMY          Social History     Occupational History   • Not on file   Tobacco Use   • Smoking status: Never Smoker   • Smokeless tobacco: Never Used   Substance and Sexual Activity   • Alcohol use: Yes     Comment: SOCIAL USE   • Drug use: Never   •  Sexual activity: Not on file      Social History     Social History Narrative   • Not on file        Family History   Problem Relation Age of Onset   • Heart disease Mother    • Cancer Father    • Malig Hyperthermia Neg Hx            Physical Exam   There were no vitals taken for this visit.  Constitutional: Alert, cooperative, in no acute distress    Head: Normocephalic.   Eyes:   bul dermatochalasis  Neck: Normal range of motion.   Cardiovascular: Normal rate.    Pulmonary/Chest: Effort normal.   Neurological: Alert.   Skin: Skin is warm.   Psychiatric: Normal mood and affect.       Assessment/Plan:  The patient voiced understanding of the risks, benefits, and alternative forms of treatment that were discussed and the patient consents to proceed with BILATERAL UPPER LID BLEPHAROPLASTY.       Stanley Fulton MD

## 2021-03-02 NOTE — ANESTHESIA PREPROCEDURE EVALUATION
Anesthesia Evaluation     Patient summary reviewed and Nursing notes reviewed   history of anesthetic complications: PONV  NPO Solid Status: > 8 hours             Airway   Mallampati: II  TM distance: >3 FB  Neck ROM: full  no difficulty expected  Dental - normal exam     Pulmonary - negative pulmonary ROS and normal exam   Cardiovascular - negative cardio ROS and normal exam        Neuro/Psych  (+) psychiatric history Anxiety and Depression,     GI/Hepatic/Renal/Endo - negative ROS     Musculoskeletal     (+) back pain,   Abdominal  - normal exam   Substance History - negative use     OB/GYN negative ob/gyn ROS         Other                        Anesthesia Plan    ASA 2     MAC     intravenous induction     Anesthetic plan, all risks, benefits, and alternatives have been provided, discussed and informed consent has been obtained with: patient.    Plan discussed with CRNA.

## 2021-03-02 NOTE — ANESTHESIA PROCEDURE NOTES
Airway  Urgency: elective    Date/Time: 3/2/2021 12:26 PM  Airway not difficult    General Information and Staff    Patient location during procedure: OR  CRNA: Vita Laguna CRNA    Indications and Patient Condition  Indications for airway management: airway protection    Preoxygenated: yes  Mask difficulty assessment: 1 - vent by mask    Final Airway Details  Final airway type: supraglottic airway      Successful airway: classic  Size 4    Number of attempts at approach: 1  Assessment: lips, teeth, and gum same as pre-op    Additional Comments  Smooth IV induction. LMA inserted with ease. Cuff up. LMA secured BEBS

## 2021-03-02 NOTE — OP NOTE
OPERATIVE NOTE    Patient Identification:  Name: Natalie Yin  Age: 71 y.o.  Sex: female  :  1950  MRN: [id number]                                               Preoperative diagnosis: Bilateral upper eyelid dermatochalasis  Postoperative diagnosis: same  Procedure: Bilateral upper eyelid blepharoplasty  Surgeon: Stanley Fulton MD who was present and scrubbed throughout all critical portions of the operation  Assistants: Beti Aceves MD and Олег Mccracken MS 4  Anesthesia: General  EBL: less than 50cc  Specimens:  * No orders in the log *    Description of the procedure: The patient was taken to the operating room and placed on the table in the supine position, where anesthesia was induced. 2% lidocaine with epinephrine and 0.5% marcaine in a 1:1 fashion was injected over the surgical site, and the patient was prepped and draped in the usual manner for orbitofacial surgery.     Corneal protectors were placed in both eyes.    A 15 Bard-Tavon blade incision was made 8 mm from the eyelash margin, across the entire horizontal extent of the right upper eyelid. A second incision was made 10 mm inferior to the junction of the brow and eyelid, and a pinch test was used to ensure the amount of skin excision was appropriate. The intervening tissue was excised with a 15 Bard-Tavon blade and Zeke scissors. Excessive orbicularis tissue was removed. The orbital septum was opened horizontally, and excessive fatty tissue was also removed. Bleeding was controlled with electrocauterization. The skin was then closed with 5-0 fast absorbing suture in an interrupted and running fashion, with care to incorporate the prestarsal orbicularis over the pupil, nasal and temporal limbi respectively. The lid position and contour were examined and found to be satisfactory.     The exact same procedure was performed over the contralateral upper lid.     The corneal protectors were removed and antibiotic ophthalmic ointment was placed  over the surgical site.     The patient was then awakened and taken from the operating room in good condition, having tolerated the procedure well. There were no complications, and the estimated blood loss was less than 50 cc.

## 2021-09-21 NOTE — DISCHARGE INSTRUCTIONS
Take the following medications the morning of surgery:    OMEPRAZOLE     If you are on prescription narcotic pain medication to control your pain you may also take that medication the morning of surgery.    General Instructions:  • Do not eat solid food after midnight the night before surgery.  • You may drink clear liquids day of surgery but must stop at least one hour before your hospital arrival time.  • It is beneficial for you to have a clear drink that contains carbohydrates the day of surgery.  We suggest a 12 to 20 ounce bottle of Gatorade or Powerade for non-diabetic patients     Clear liquids are liquids you can see through.  Nothing red in color.     Plain water                               Sports drinks  Sodas                                   Gelatin (Jell-O)  Fruit juices without pulp such as white grape juice and apple juice  Popsicles that contain no fruit or yogurt  Tea or coffee (no cream or milk added)  Gatorade / Powerade  G2 / Powerade Zero  n  • Do not  drink alcohol the day of surgery. .  • Bring any papers given to you in the doctor’s office.  • Wear clean comfortable clothes.  • Do not wear contact lenses, false eyelashes or make-up.  Bring a case for your glasses.   • Remove all piercings.  Leave jewelry and any other valuables at home.  • Hair extensions with metal clips must be removed prior to surgery.  • The Pre-Admission Testing nurse will instruct you to bring medications if unable to obtain an accurate list in Pre-Admission Testing.   • REPORT TO SURGERY ENTRANCE ON 9- AT 0630 AM           Preventing a Surgical Site Infection:  • For 2 to 3 days before surgery, avoid shaving with a razor because the razor can irritate skin and make it easier to develop an infection.    • Any areas of open skin can increase the risk of a post-operative wound infection by allowing bacteria to enter and travel throughout the body.  Notify your surgeon if you have any skin wounds / rashes even if  it is not near the expected surgical site.  The area will need assessed to determine if surgery should be delayed until it is healed.  • The night prior to surgery shower using a fresh bar of anti-bacterial soap (such as Dial) and clean washcloth.  Sleep in a clean bed with clean clothing.  Do not allow pets to sleep with you.  • Shower on the morning of surgery using a fresh bar of anti-bacterial soap (such as Dial) and clean washcloth.  Dry with a clean towel and dress in clean clothing.  • Ask your surgeon if you will be receiving antibiotics prior to surgery.  • Make sure you, your family, and all healthcare providers clean their hands with soap and water or an alcohol based hand  before caring for you or your wound.    Day of surgery:  Your arrival time is approximately two hours before your scheduled surgery time.  Upon arrival, a Pre-op nurse and Anesthesiologist will review your health history, obtain vital signs, and answer questions you may have.  The only belongings needed at this time will be a list of your home medications and if applicable your C-PAP/BI-PAP machine.  A Pre-op nurse will start an IV and you may receive medication in preparation for surgery, including something to help you relax.     Please be aware that surgery does come with discomfort.  We want to make every effort to control your discomfort so please discuss any uncontrolled symptoms with your nurse.   Your doctor will most likely have prescribed pain medications.      If you are going home after surgery you will receive individualized written care instructions before being discharged.  A responsible adult must drive you to and from the hospital on the day of your surgery and stay with you for 24 hours.  Discharge prescriptions can be filled by the hospital pharmacy during regular pharmacy hours.  If you are having surgery late in the day/evening your prescription may be e-prescribed to your pharmacy.  Please verify your  pharmacy hours or chose a 24 hour pharmacy to avoid not having access to your prescription because your pharmacy has closed for the day.        If you have any questions please call Pre-Admission Testing at [phone].  Deductibles and co-payments are collected on the day of service. Please be prepared to pay the required co-pay, deductible or deposit on the day of service as defined by your plan.    Patient Education for Self-Quarantine Process    • Following your COVID testing, we strongly recommend that you wear a mask when you are with other people and practice social distancing.   • Limit your activities to only required outings.  • Wash your hands with soap and water frequently for at least 20 seconds.   • Avoid touching your eyes, nose and mouth with unwashed hands.  • Do not share anything - utensils, drinking glasses, food from the same bowl.   • Sanitize household surfaces daily. Include all high touch areas (door handles, light switches, phones, countertops, etc.)    Call your surgeon immediately if you experience any of the following symptoms:  • Sore Throat  • Shortness of Breath or difficulty breathing  • Cough  • Chills  • Body soreness or muscle pain  • Headache  • Fever  • New loss of taste or smell  • Do not arrive for your surgery ill.  Your procedure will need to be rescheduled to another time.  You will need to call your physician before the day of surgery to avoid any unnecessary exposure to hospital staff as well as other patients.

## 2021-09-23 NOTE — ANESTHESIA PROCEDURE NOTES
Airway  Urgency: elective    Date/Time: 9/23/2021 8:14 AM    General Information and Staff    Patient location during procedure: OR  Anesthesiologist: Nilson Wallace MD  CRNA: Fiordaliza Hampton CRNA    Indications and Patient Condition  Indications for airway management: airway protection    Preoxygenated: yes  Mask difficulty assessment: 0 - not attempted    Final Airway Details  Final airway type: supraglottic airway      Successful airway: unique  Size 3    Number of attempts at approach: 1  Assessment: lips, teeth, and gum same as pre-op and atraumatic intubation

## 2021-09-23 NOTE — ANESTHESIA PREPROCEDURE EVALUATION
Anesthesia Evaluation     Patient summary reviewed and Nursing notes reviewed   history of anesthetic complications: PONV  NPO Solid Status: > 8 hours  NPO Liquid Status: > 2 hours           Airway   Mallampati: II  TM distance: >3 FB  Neck ROM: full  no difficulty expected  Dental - normal exam     Pulmonary - negative pulmonary ROS and normal exam   (-) COPD, asthma, not a smoker, lung cancer  Cardiovascular - normal exam  Exercise tolerance: good (4-7 METS)    ECG reviewed  Rhythm: regular  Rate: normal    (+) hyperlipidemia,   (-) hypertension, valvular problems/murmurs, past MI, CAD, dysrhythmias, angina, CHF, cardiac stents, CABG, pericardial effusion      Neuro/Psych  (+) psychiatric history Anxiety and Depression,     (-) seizures, TIA, CVA  GI/Hepatic/Renal/Endo    (+)  GERD well controlled,    (-) hiatal hernia, PUD, hepatitis, liver disease, no renal disease, diabetes, GI bleed, no thyroid disorder    Musculoskeletal     Abdominal  - normal exam   Substance History - negative use     OB/GYN negative ob/gyn ROS         Other   arthritis,                      Anesthesia Plan    ASA 2     general     intravenous induction     Anesthetic plan, all risks, benefits, and alternatives have been provided, discussed and informed consent has been obtained with: patient.    Plan discussed with CRNA.

## 2021-09-23 NOTE — ANESTHESIA POSTPROCEDURE EVALUATION
Patient: Natalie Yin    Procedure Summary     Date: 09/23/21 Room / Location:  [facility]    Anesthesia Start: 0806 Anesthesia Stop: 0913    Procedure: BILATERAL TEMPORAL DIRECT BROW LIFT, LEFT UPPER LID MULLERECTOMY (Bilateral Face) Diagnosis:     Surgeons: Stanley Fulton MD Provider: Render, Nilson Grigsby MD    Anesthesia Type: general ASA Status: 2          Anesthesia Type: general    Vitals  Vitals Value Taken Time   /91 09/23/21 0946   Temp 36.4 °C (97.5 °F) 09/23/21 0945   Pulse 79 09/23/21 0953   Resp 16 09/23/21 0945   SpO2 97 % 09/23/21 0953   Vitals shown include unvalidated device data.        Post Anesthesia Care and Evaluation    Patient location during evaluation: bedside  Patient participation: complete - patient participated  Level of consciousness: awake and alert  Pain management: adequate  Airway patency: patent  Anesthetic complications: No anesthetic complications    Cardiovascular status: acceptable  Respiratory status: acceptable  Hydration status: acceptable    Comments: /91 (BP Location: Right arm, Patient Position: Sitting)   Pulse 72   Temp 36.4 °C (97.5 °F) (Temporal)   Resp 18   SpO2 97%

## 2021-09-23 NOTE — OP NOTE
OPERATIVE NOTE    Patient Identification:  Name: Natalie Yin  Age: 71 y.o.  Sex: female  :  1950  MRN: [id number]                                               Preoperative diagnosis: Left upper lid ptosis, bilateral brow ptosis  Postoperative diagnosis: same  Procedure: Left upper lid mullerectomy, bilateral temporal direct brow lift  Surgeon: Dr. Fulton who was present and scrubbed throughout all critical portions of the operation  Assistants: none   Anesthesia: General  EBL: less than 50 mL.  Specimens:  * No orders in the log *    Description of the procedure: The patient was taken to the operating room and placed on the table in the supine position, where anesthesia was induced. 2% lidocaine with epinephrine and 0.5% marcaine in a 1:1 fashion was injected over the surgical site, and the patient was prepped and draped in the usual manner for orbitofacial surgery.     Corneal protectors were placed in both eyes.     We first performed the temporal direct brow lift.  A 15 Bard-Tavon blade incision was made above the right eyebrow line, across the temporal horizontal extent of the brow. A second incision line was drawn 8 mm above the eyebrow line, and the intervening tissue was excised with a Bard Tavon blade and Zeke scissors. Sharp dissection was carried down to the frontalis muscle. The eyebrow was undermined inferiorly and was mobilized superiorly. The brow was fixated superiorly to the frontalis muscle with 3-0 vicryl sutures deeply. The incision was then closed with 5-0 Vicryl sutures subcutaneously and 6-0 Prolene sutures superficially.     The exact same procedure was preformed over the contralateral upper lid.     We then moved to the left upper lid mullerectomy        A 5-0 silk everting suture was used to asmita the left upper eyelid.  A small desmarres retractor was used to help asmita the upper eyelid to view the superior border of the tarsal plate.  A caliper on 3mm was used to nishant from  the tarsal border onto conjunctiva and the area was infiltrated with local anesthetic.  A 5-0 silk suture was passed in mattress fasihon along the length of the nishant engaging huizar's muscle and conjunctiva.  A Putterman clamp was placed on the tissue between tarsal border and the silk suture and secured.  A running 5-0 fast absorbing plain gut suture was passed externally and run lateral to medial and then medial to lateral at the inferior border of the clamp and externatlized.  A 15 blade was used to remove the clamped tissue and then the plain gut was tied.  The lid was noted to lift properly after the procedure     The corneal protectors were removed and antibiotic ophthalmic ointment was placed over the surgical site.      The patient was then awakened and taken from the operating room in good condition, having tolerated the procedure well. There were no complications, and the estimated blood loss was less than 50 cc.

## 2021-09-23 NOTE — H&P
History & Physical       Patient: Natalie Yin    Date of Admission: 9/23/2021  6:40 AM    YOB: 1950    Medical Record Number: [id number]      Chief Complaints: eyelids drooping and causing loss of vision      History of Present Illness: 71 y.o. female presents with bilateral brow ptosis with left ptosis. No new meds/health problems since office visit      Allergies: No Known Allergies    10 point review of systems negative, except pertaining to the HPI    Medications:   Home Medications:  No current facility-administered medications on file prior to encounter.     Current Outpatient Medications on File Prior to Encounter   Medication Sig   • erythromycin (ROMYCIN) 5 MG/GM ophthalmic ointment Apply  to eye(s) as directed by provider 2 (two) times a day. Apply to surgical site 2x/day. May use in eye for discomfort.   • HYDROcodone-acetaminophen (NORCO) 5-325 MG per tablet Take 1 tablet by mouth Every 6 (Six) Hours As Needed for Moderate Pain  (pain).   • omeprazole (priLOSEC) 20 MG capsule Take 20 mg by mouth Daily.   • PARoxetine (PAXIL) 10 MG tablet Take 10 mg by mouth Every Night.   • simvastatin (ZOCOR) 10 MG tablet Take 10 mg by mouth Every Night.   • zolpidem (AMBIEN) 10 MG tablet Take 10 mg by mouth At Night As Needed.     Current Medications:  Scheduled Meds:  Continuous Infusions:No current facility-administered medications for this encounter.    PRN Meds:.    Past Medical History:   Diagnosis Date   • Anxiety and depression    • Arthritis    • DDD (degenerative disc disease), lumbar    • Diverticulosis    • GERD (gastroesophageal reflux disease)    • Hyperlipidemia    • PONV (postoperative nausea and vomiting)         Past Surgical History:   Procedure Laterality Date   • BLEPHAROPLASTY Bilateral 3/2/2021    Procedure: BILATERAL UPPER LID BLEPHAROPLASY;  Surgeon: Stanley Fulton MD;  [place] [facility] [place] [facility] [place]  Service: Ophthalmology;  Laterality: Bilateral;   • BUNIONECTOMY Right    •  COLONOSCOPY     • ENDOSCOPY     • HYSTERECTOMY  1990'S   • KNEE ARTHROPLASTY Bilateral 2013 2017   • LUMBAR DISC SURGERY  1990'S   • TONSILLECTOMY          Social History     Occupational History   • Not on file   Tobacco Use   • Smoking status: Never Smoker   • Smokeless tobacco: Never Used   Substance and Sexual Activity   • Alcohol use: Yes     Comment: SOCIAL USE   • Drug use: Never   • Sexual activity: Defer      Social History     Social History Narrative   • Not on file        Family History   Problem Relation Age of Onset   • Heart disease Mother    • Cancer Father    • Malig Hyperthermia Neg Hx            Physical Exam   There were no vitals taken for this visit.  Constitutional: Alert, cooperative, in no acute distress    Head: Normocephalic.   Eyes:   oliverio ptosis, bilateral brow ptosis  Neck: Normal range of motion.   Cardiovascular: Normal rate.    Pulmonary/Chest: Effort normal.   Neurological: Alert.   Skin: Skin is warm.   Psychiatric: Normal mood and affect.       Assessment/Plan:  The patient voiced understanding of the risks, benefits, and alternative forms of treatment that were discussed and the patient consents to proceed with bilateral temporal direct brow lift, left upper lid mullerectomy      Stanley Fulton MD

## 2022-01-01 ENCOUNTER — HOSPITAL ENCOUNTER (OUTPATIENT)
Dept: RESPIRATORY THERAPY | Facility: HOSPITAL | Age: 72
Discharge: HOME | End: 2022-03-11
Admitting: INTERNAL MEDICINE

## 2022-01-01 ENCOUNTER — LAB (OUTPATIENT)
Dept: LAB | Facility: HOSPITAL | Age: 72
End: 2022-01-01

## 2022-01-01 ENCOUNTER — TRANSCRIBE ORDERS (OUTPATIENT)
Dept: ADMINISTRATIVE | Facility: HOSPITAL | Age: 72
End: 2022-01-01

## 2022-01-01 ENCOUNTER — APPOINTMENT (OUTPATIENT)
Dept: CT IMAGING | Facility: HOSPITAL | Age: 72
End: 2022-01-01

## 2022-01-01 ENCOUNTER — ANESTHESIA EVENT (OUTPATIENT)
Dept: CARDIOLOGY | Facility: HOSPITAL | Age: 72
End: 2022-01-01

## 2022-01-01 ENCOUNTER — HOSPITAL ENCOUNTER (EMERGENCY)
Facility: HOSPITAL | Age: 72
End: 2022-09-15
Attending: EMERGENCY MEDICINE | Admitting: FAMILY MEDICINE

## 2022-01-01 ENCOUNTER — APPOINTMENT (OUTPATIENT)
Dept: CARDIOLOGY | Facility: HOSPITAL | Age: 72
End: 2022-01-01

## 2022-01-01 ENCOUNTER — APPOINTMENT (OUTPATIENT)
Dept: GENERAL RADIOLOGY | Facility: HOSPITAL | Age: 72
End: 2022-01-01

## 2022-01-01 ENCOUNTER — ANESTHESIA (OUTPATIENT)
Dept: CARDIOLOGY | Facility: HOSPITAL | Age: 72
End: 2022-01-01

## 2022-01-01 ENCOUNTER — HOSPITAL ENCOUNTER (INPATIENT)
Facility: HOSPITAL | Age: 72
LOS: 3 days | Discharge: HOME | End: 2022-01-09
Attending: INTERNAL MEDICINE | Admitting: INTERNAL MEDICINE

## 2022-01-01 ENCOUNTER — OFFICE VISIT (OUTPATIENT)
Dept: PULMONOLOGY | Facility: HOSPITAL | Age: 72
End: 2022-01-01

## 2022-01-01 ENCOUNTER — READMISSION MANAGEMENT (OUTPATIENT)
Dept: CALL CENTER | Facility: HOSPITAL | Age: 72
End: 2022-01-01

## 2022-01-01 VITALS
WEIGHT: 146 LBS | HEART RATE: 86 BPM | HEIGHT: 65 IN | RESPIRATION RATE: 12 BRPM | BODY MASS INDEX: 24.32 KG/M2 | DIASTOLIC BLOOD PRESSURE: 67 MMHG | SYSTOLIC BLOOD PRESSURE: 122 MMHG | OXYGEN SATURATION: 97 %

## 2022-01-01 VITALS
TEMPERATURE: 98.2 F | BODY MASS INDEX: 24.34 KG/M2 | SYSTOLIC BLOOD PRESSURE: 109 MMHG | HEART RATE: 71 BPM | RESPIRATION RATE: 17 BRPM | DIASTOLIC BLOOD PRESSURE: 68 MMHG | HEIGHT: 65 IN | WEIGHT: 146.1 LBS | OXYGEN SATURATION: 96 %

## 2022-01-01 VITALS
RESPIRATION RATE: 17 BRPM | TEMPERATURE: 97.7 F | HEIGHT: 64 IN | BODY MASS INDEX: 24.75 KG/M2 | DIASTOLIC BLOOD PRESSURE: 78 MMHG | HEART RATE: 35 BPM | WEIGHT: 145 LBS | SYSTOLIC BLOOD PRESSURE: 121 MMHG | OXYGEN SATURATION: 69 %

## 2022-01-01 VITALS
SYSTOLIC BLOOD PRESSURE: 121 MMHG | BODY MASS INDEX: 24.16 KG/M2 | OXYGEN SATURATION: 97 % | WEIGHT: 145 LBS | DIASTOLIC BLOOD PRESSURE: 71 MMHG | RESPIRATION RATE: 16 BRPM | HEART RATE: 69 BPM | HEIGHT: 65 IN

## 2022-01-01 VITALS
OXYGEN SATURATION: 99 % | BODY MASS INDEX: 24.43 KG/M2 | WEIGHT: 146.6 LBS | TEMPERATURE: 98.2 F | HEART RATE: 85 BPM | SYSTOLIC BLOOD PRESSURE: 122 MMHG | DIASTOLIC BLOOD PRESSURE: 77 MMHG | RESPIRATION RATE: 15 BRPM | HEIGHT: 65 IN

## 2022-01-01 DIAGNOSIS — M34.9 SCLERODERMA: ICD-10-CM

## 2022-01-01 DIAGNOSIS — I50.21 ACUTE SYSTOLIC CONGESTIVE HEART FAILURE: ICD-10-CM

## 2022-01-01 DIAGNOSIS — I27.20 PULMONARY HYPERTENSION: Primary | ICD-10-CM

## 2022-01-01 DIAGNOSIS — I27.20 PULMONARY HYPERTENSION: ICD-10-CM

## 2022-01-01 DIAGNOSIS — I46.9 CARDIOPULMONARY ARREST WITH SUCCESSFUL RESUSCITATION: ICD-10-CM

## 2022-01-01 DIAGNOSIS — Z01.812 PRE-PROCEDURE LAB EXAM: Primary | ICD-10-CM

## 2022-01-01 DIAGNOSIS — Z91.199 FAILURE TO ATTEND APPOINTMENT: Primary | ICD-10-CM

## 2022-01-01 DIAGNOSIS — I51.7 RIGHT VENTRICULAR DILATION: ICD-10-CM

## 2022-01-01 DIAGNOSIS — Z01.812 PRE-PROCEDURE LAB EXAM: ICD-10-CM

## 2022-01-01 DIAGNOSIS — R06.00 DYSPNEA, UNSPECIFIED TYPE: ICD-10-CM

## 2022-01-01 DIAGNOSIS — I20.8 STABLE ANGINA PECTORIS: ICD-10-CM

## 2022-01-01 DIAGNOSIS — R07.9 CHEST PAIN, UNSPECIFIED TYPE: Primary | ICD-10-CM

## 2022-01-01 LAB
ALBUMIN SERPL-MCNC: 3.7 G/DL (ref 3.5–5.2)
ALBUMIN SERPL-MCNC: 3.9 G/DL (ref 3.5–5.2)
ALBUMIN SERPL-MCNC: 4.3 G/DL (ref 3.5–5.2)
ALBUMIN SERPL-MCNC: 4.3 G/DL (ref 3.5–5.2)
ALBUMIN/GLOB SERPL: 1.2 G/DL
ALBUMIN/GLOB SERPL: 1.2 G/DL
ALBUMIN/GLOB SERPL: 1.3 G/DL
ALP SERPL-CCNC: 102 U/L (ref 39–117)
ALP SERPL-CCNC: 67 U/L (ref 39–117)
ALP SERPL-CCNC: 75 U/L (ref 39–117)
ALP SERPL-CCNC: 99 U/L (ref 39–117)
ALT SERPL W P-5'-P-CCNC: 26 U/L (ref 1–33)
ALT SERPL W P-5'-P-CCNC: 31 U/L (ref 1–33)
ALT SERPL W P-5'-P-CCNC: 5 U/L (ref 1–33)
ALT SERPL W P-5'-P-CCNC: 8 U/L (ref 1–33)
ANA SER QL: POSITIVE
ANION GAP SERPL CALCULATED.3IONS-SCNC: 12 MMOL/L (ref 5–15)
ANION GAP SERPL CALCULATED.3IONS-SCNC: 12 MMOL/L (ref 5–15)
ANION GAP SERPL CALCULATED.3IONS-SCNC: 13 MMOL/L (ref 5–15)
ANION GAP SERPL CALCULATED.3IONS-SCNC: 14 MMOL/L (ref 5–15)
ANION GAP SERPL CALCULATED.3IONS-SCNC: 15 MMOL/L (ref 5–15)
APTT PPP: 26.5 SECONDS (ref 24–31)
APTT PPP: <20 SECONDS (ref 61–76.5)
ARTERIAL PATENCY WRIST A: POSITIVE
AST SERPL-CCNC: 15 U/L (ref 1–32)
AST SERPL-CCNC: 15 U/L (ref 1–32)
AST SERPL-CCNC: 17 U/L (ref 1–32)
AST SERPL-CCNC: 20 U/L (ref 1–32)
ATMOSPHERIC PRESS: ABNORMAL MM[HG]
BASE EXCESS BLDA CALC-SCNC: -21.5 MMOL/L (ref 0–3)
BASOPHILS # BLD AUTO: 0 10*3/MM3 (ref 0–0.2)
BASOPHILS # BLD AUTO: 0.04 10*3/MM3 (ref 0–0.2)
BASOPHILS # BLD AUTO: 0.1 10*3/MM3 (ref 0–0.2)
BASOPHILS NFR BLD AUTO: 0.5 % (ref 0–1.5)
BASOPHILS NFR BLD AUTO: 0.7 % (ref 0–1.5)
BASOPHILS NFR BLD AUTO: 0.7 % (ref 0–1.5)
BASOPHILS NFR BLD AUTO: 0.9 % (ref 0–1.5)
BASOPHILS NFR BLD AUTO: 0.9 % (ref 0–1.5)
BDY SITE: ABNORMAL
BH CV ECHO MEAS - % IVS THICK: 55.3 %
BH CV ECHO MEAS - % LVPW THICK: 36.6 %
BH CV ECHO MEAS - ACS: 1.9 CM
BH CV ECHO MEAS - ACS: 1.92 CM
BH CV ECHO MEAS - AO MAX PG (FULL): 1.5 MMHG
BH CV ECHO MEAS - AO MAX PG: 4.3 MMHG
BH CV ECHO MEAS - AO MAX PG: 5.4 MMHG
BH CV ECHO MEAS - AO MEAN PG (FULL): 1.2 MMHG
BH CV ECHO MEAS - AO MEAN PG: 2.6 MMHG
BH CV ECHO MEAS - AO MEAN PG: 3 MMHG
BH CV ECHO MEAS - AO ROOT AREA (BSA CORRECTED): 1.5
BH CV ECHO MEAS - AO ROOT AREA: 5.3 CM^2
BH CV ECHO MEAS - AO ROOT DIAM: 2.6 CM
BH CV ECHO MEAS - AO ROOT DIAM: 3.2 CM
BH CV ECHO MEAS - AO V2 MAX: 103.3 CM/SEC
BH CV ECHO MEAS - AO V2 MAX: 116.4 CM/SEC
BH CV ECHO MEAS - AO V2 MEAN: 82.6 CM/SEC
BH CV ECHO MEAS - AO V2 VTI: 14.3 CM
BH CV ECHO MEAS - AO V2 VTI: 22.9 CM
BH CV ECHO MEAS - AVA(I,A): 2.3 CM^2
BH CV ECHO MEAS - AVA(I,D): 1.92 CM2
BH CV ECHO MEAS - AVA(I,D): 2.3 CM^2
BH CV ECHO MEAS - AVA(V,A): 2.3 CM^2
BH CV ECHO MEAS - AVA(V,D): 2.3 CM^2
BH CV ECHO MEAS - BSA(HAYCOCK): 1.8 M^2
BH CV ECHO MEAS - BSA: 1.8 M^2
BH CV ECHO MEAS - BZI_BMI: 25.1 KILOGRAMS/M^2
BH CV ECHO MEAS - BZI_METRIC_HEIGHT: 165.1 CM
BH CV ECHO MEAS - BZI_METRIC_WEIGHT: 68.5 KG
BH CV ECHO MEAS - EDV(CUBED): 14.9 ML
BH CV ECHO MEAS - EDV(CUBED): 49.2 ML
BH CV ECHO MEAS - EDV(MOD-SP4): 16.9 ML
BH CV ECHO MEAS - EDV(MOD-SP4): 61 ML
BH CV ECHO MEAS - EDV(TEICH): 56.8 ML
BH CV ECHO MEAS - EF(CUBED): 66.3 %
BH CV ECHO MEAS - EF(MOD-BP): 59 %
BH CV ECHO MEAS - EF(MOD-BP): 61 %
BH CV ECHO MEAS - EF(MOD-SP4): 49.2 %
BH CV ECHO MEAS - EF(MOD-SP4): 61.1 %
BH CV ECHO MEAS - EF(TEICH): 58.7 %
BH CV ECHO MEAS - ESV(CUBED): 16.6 ML
BH CV ECHO MEAS - ESV(CUBED): 7.1 ML
BH CV ECHO MEAS - ESV(MOD-SP4): 23.8 ML
BH CV ECHO MEAS - ESV(MOD-SP4): 8.6 ML
BH CV ECHO MEAS - ESV(TEICH): 23.4 ML
BH CV ECHO MEAS - FS: 22.1 %
BH CV ECHO MEAS - FS: 30.4 %
BH CV ECHO MEAS - IVS/LVPW: 1
BH CV ECHO MEAS - IVS/LVPW: 1.27 CM
BH CV ECHO MEAS - IVSD: 1.1 CM
BH CV ECHO MEAS - IVSD: 1.37 CM
BH CV ECHO MEAS - IVSS: 1.7 CM
BH CV ECHO MEAS - LA A2CS (ATRIAL LENGTH): 2.5 CM
BH CV ECHO MEAS - LA DIMENSION(2D): 3.3 CM
BH CV ECHO MEAS - LV DIASTOLIC VOL/BSA (35-75): 34.8 ML/M^2
BH CV ECHO MEAS - LV DIASTOLIC VOL/BSA (35-75): 9.9 CM2
BH CV ECHO MEAS - LV MASS(C)D: 129 GRAMS
BH CV ECHO MEAS - LV MASS(C)D: 86 GRAMS
BH CV ECHO MEAS - LV MASS(C)DI: 73.5 GRAMS/M^2
BH CV ECHO MEAS - LV MASS(C)S: 148.2 GRAMS
BH CV ECHO MEAS - LV MASS(C)SI: 84.4 GRAMS/M^2
BH CV ECHO MEAS - LV MAX PG: 2.29 MMHG
BH CV ECHO MEAS - LV MAX PG: 4 MMHG
BH CV ECHO MEAS - LV MEAN PG: 1.08 MMHG
BH CV ECHO MEAS - LV MEAN PG: 1.8 MMHG
BH CV ECHO MEAS - LV SYSTOLIC VOL/BSA (12-30): 13.5 ML/M^2
BH CV ECHO MEAS - LV SYSTOLIC VOL/BSA (12-30): 5 CM2
BH CV ECHO MEAS - LV V1 MAX: 75.6 CM/SEC
BH CV ECHO MEAS - LV V1 MAX: 99.5 CM/SEC
BH CV ECHO MEAS - LV V1 MEAN: 63.2 CM/SEC
BH CV ECHO MEAS - LV V1 VTI: 10.8 CM
BH CV ECHO MEAS - LV V1 VTI: 19 CM
BH CV ECHO MEAS - LVIDD: 2.46 CM
BH CV ECHO MEAS - LVIDD: 3.7 CM
BH CV ECHO MEAS - LVIDS: 1.92 CM
BH CV ECHO MEAS - LVIDS: 2.5 CM
BH CV ECHO MEAS - LVOT AREA: 2.6 CM2
BH CV ECHO MEAS - LVOT AREA: 2.7 CM^2
BH CV ECHO MEAS - LVOT DIAM: 1.8 CM
BH CV ECHO MEAS - LVOT DIAM: 1.9 CM
BH CV ECHO MEAS - LVPWD: 1.07 CM
BH CV ECHO MEAS - LVPWD: 1.1 CM
BH CV ECHO MEAS - LVPWS: 1.5 CM
BH CV ECHO MEAS - MV A MAX VEL: 49.5 CM/SEC
BH CV ECHO MEAS - MV A MAX VEL: 51.1 CM/SEC
BH CV ECHO MEAS - MV DEC SLOPE: 164.6 CM/SEC^2
BH CV ECHO MEAS - MV DEC SLOPE: 326.3 CM/SEC2
BH CV ECHO MEAS - MV DEC TIME: 0.15 MSEC
BH CV ECHO MEAS - MV DEC TIME: 0.24 SEC
BH CV ECHO MEAS - MV E MAX VEL: 40 CM/SEC
BH CV ECHO MEAS - MV E MAX VEL: 48 CM/SEC
BH CV ECHO MEAS - MV E/A: 0.81
BH CV ECHO MEAS - MV E/A: 0.94
BH CV ECHO MEAS - MV MAX PG: 1.49 MMHG
BH CV ECHO MEAS - MV MAX PG: 2.1 MMHG
BH CV ECHO MEAS - MV MEAN PG: 0.56 MMHG
BH CV ECHO MEAS - MV MEAN PG: 0.92 MMHG
BH CV ECHO MEAS - MV V2 MAX: 72.5 CM/SEC
BH CV ECHO MEAS - MV V2 MEAN: 44.8 CM/SEC
BH CV ECHO MEAS - MV V2 VTI: 15.4 CM
BH CV ECHO MEAS - MV V2 VTI: 17.5 CM
BH CV ECHO MEAS - MVA(VTI): 1.79 CM2
BH CV ECHO MEAS - MVA(VTI): 3 CM^2
BH CV ECHO MEAS - PA ACC TIME: 0.03 SEC
BH CV ECHO MEAS - PA ACC TIME: 0.09 SEC
BH CV ECHO MEAS - PA MAX PG (FULL): 1.7 MMHG
BH CV ECHO MEAS - PA MAX PG: 3.5 MMHG
BH CV ECHO MEAS - PA MEAN PG (FULL): 0.85 MMHG
BH CV ECHO MEAS - PA MEAN PG: 2 MMHG
BH CV ECHO MEAS - PA PR(ACCEL): 38.5 MMHG
BH CV ECHO MEAS - PA PR(ACCEL): 66.5 MMHG
BH CV ECHO MEAS - PA V2 MAX: 59.8 CM/SEC
BH CV ECHO MEAS - PA V2 MAX: 94.1 CM/SEC
BH CV ECHO MEAS - PA V2 MEAN: 67.4 CM/SEC
BH CV ECHO MEAS - PA V2 VTI: 16.9 CM
BH CV ECHO MEAS - RAP SYSTOLE: 10 MMHG
BH CV ECHO MEAS - RAP SYSTOLE: 15 MMHG
BH CV ECHO MEAS - RAP SYSTOLE: 3 MMHG
BH CV ECHO MEAS - RV MAX PG: 0.88 MMHG
BH CV ECHO MEAS - RV MAX PG: 1.8 MMHG
BH CV ECHO MEAS - RV MEAN PG: 1.1 MMHG
BH CV ECHO MEAS - RV V1 MAX: 46.8 CM/SEC
BH CV ECHO MEAS - RV V1 MAX: 67.8 CM/SEC
BH CV ECHO MEAS - RV V1 MEAN: 50 CM/SEC
BH CV ECHO MEAS - RV V1 VTI: 15.4 CM
BH CV ECHO MEAS - RV V1 VTI: 7.7 CM
BH CV ECHO MEAS - RVDD: 3.2 CM
BH CV ECHO MEAS - RVDD: 4 CM
BH CV ECHO MEAS - RVSP: 61.8 MMHG
BH CV ECHO MEAS - RVSP: 67.2 MMHG
BH CV ECHO MEAS - RVSP: 71.3 MMHG
BH CV ECHO MEAS - SI(AO): 68.6 ML/M^2
BH CV ECHO MEAS - SI(CUBED): 18.6 ML/M^2
BH CV ECHO MEAS - SI(LVOT): 29.6 ML/M^2
BH CV ECHO MEAS - SI(MOD-SP4): 21.2 ML/M^2
BH CV ECHO MEAS - SI(MOD-SP4): 4.9 ML/M2
BH CV ECHO MEAS - SI(TEICH): 19 ML/M^2
BH CV ECHO MEAS - SV(AO): 120.5 ML
BH CV ECHO MEAS - SV(CUBED): 32.6 ML
BH CV ECHO MEAS - SV(LVOT): 27.5 ML
BH CV ECHO MEAS - SV(LVOT): 51.9 ML
BH CV ECHO MEAS - SV(MOD-SP4): 37.3 ML
BH CV ECHO MEAS - SV(MOD-SP4): 8.3 ML
BH CV ECHO MEAS - SV(TEICH): 33.3 ML
BH CV ECHO MEAS - TR MAX PG: 52.2 MMHG
BH CV ECHO MEAS - TR MAX VEL: 359.5 CM/SEC
BH CV ECHO MEAS - TR MAX VEL: 383.2 CM/SEC
BH CV ECHO MEAS - TR MAX VEL: 391.3 CM/SEC
BILIRUB CONJ SERPL-MCNC: <0.2 MG/DL (ref 0–0.3)
BILIRUB INDIRECT SERPL-MCNC: NORMAL MG/DL
BILIRUB SERPL-MCNC: 0.2 MG/DL (ref 0–1.2)
BILIRUB SERPL-MCNC: 0.3 MG/DL (ref 0–1.2)
BILIRUB SERPL-MCNC: 0.4 MG/DL (ref 0–1.2)
BILIRUB SERPL-MCNC: 0.5 MG/DL (ref 0–1.2)
BUN SERPL-MCNC: 11 MG/DL (ref 8–23)
BUN SERPL-MCNC: 12 MG/DL (ref 8–23)
BUN SERPL-MCNC: 13 MG/DL (ref 8–23)
BUN SERPL-MCNC: 16 MG/DL (ref 8–23)
BUN SERPL-MCNC: 18 MG/DL (ref 8–23)
BUN/CREAT SERPL: 15.1 (ref 7–25)
BUN/CREAT SERPL: 15.9 (ref 7–25)
BUN/CREAT SERPL: 18.8 (ref 7–25)
BUN/CREAT SERPL: 20 (ref 7–25)
BUN/CREAT SERPL: 21.4 (ref 7–25)
C-ANCA TITR SER IF: NORMAL TITER
CA-I BLDA-SCNC: 1.23 MMOL/L (ref 1.15–1.33)
CALCIUM SPEC-SCNC: 9.1 MG/DL (ref 8.6–10.5)
CALCIUM SPEC-SCNC: 9.1 MG/DL (ref 8.6–10.5)
CALCIUM SPEC-SCNC: 9.2 MG/DL (ref 8.6–10.5)
CALCIUM SPEC-SCNC: 9.4 MG/DL (ref 8.6–10.5)
CALCIUM SPEC-SCNC: 9.8 MG/DL (ref 8.6–10.5)
CHLORIDE SERPL-SCNC: 101 MMOL/L (ref 98–107)
CHLORIDE SERPL-SCNC: 101 MMOL/L (ref 98–107)
CHLORIDE SERPL-SCNC: 102 MMOL/L (ref 98–107)
CHLORIDE SERPL-SCNC: 102 MMOL/L (ref 98–107)
CHLORIDE SERPL-SCNC: 105 MMOL/L (ref 98–107)
CHROMATIN AB SERPL-ACNC: 0.5 AI (ref 0–0.9)
CK MB SERPL-CCNC: 1.53 NG/ML
CK SERPL-CCNC: 32 U/L (ref 20–180)
CO2 SERPL-SCNC: 22 MMOL/L (ref 22–29)
CO2 SERPL-SCNC: 23 MMOL/L (ref 22–29)
CO2 SERPL-SCNC: 23 MMOL/L (ref 22–29)
CO2 SERPL-SCNC: 25 MMOL/L (ref 22–29)
CO2 SERPL-SCNC: 26 MMOL/L (ref 22–29)
CREAT SERPL-MCNC: 0.64 MG/DL (ref 0.57–1)
CREAT SERPL-MCNC: 0.69 MG/DL (ref 0.57–1)
CREAT SERPL-MCNC: 0.8 MG/DL (ref 0.57–1)
CREAT SERPL-MCNC: 0.84 MG/DL (ref 0.57–1)
CREAT SERPL-MCNC: 0.86 MG/DL (ref 0.57–1)
D DIMER PPP FEU-MCNC: 1.7 MG/L (FEU) (ref 0–0.59)
D-LACTATE SERPL-SCNC: 17.4 MMOL/L (ref 0.5–2)
DEPRECATED RDW RBC AUTO: 41.9 FL (ref 37–54)
DEPRECATED RDW RBC AUTO: 45.9 FL (ref 37–54)
DEPRECATED RDW RBC AUTO: 45.9 FL (ref 37–54)
DEPRECATED RDW RBC AUTO: 46.4 FL (ref 37–54)
DEPRECATED RDW RBC AUTO: 47.3 FL (ref 37–54)
DSDNA AB SER-ACNC: 1 IU/ML (ref 0–9)
EGFRCR SERPLBLD CKD-EPI 2021: 72.8 ML/MIN/1.73
EGFRCR SERPLBLD CKD-EPI 2021: 74.9 ML/MIN/1.73
ENA RNP AB SER-ACNC: 0.6 AI (ref 0–0.9)
ENA RNP AB SER-ACNC: 0.6 AI (ref 0–0.9)
ENA SCL70 AB SER-ACNC: <0.2 AI (ref 0–0.9)
ENA SM AB SER-ACNC: <0.2 AI (ref 0–0.9)
ENA SM AB SER-ACNC: <0.2 AI (ref 0–0.9)
ENA SS-A AB SER-ACNC: >8 AI (ref 0–0.9)
ENA SS-B AB SER-ACNC: <0.2 AI (ref 0–0.9)
EOSINOPHIL # BLD AUTO: 0 10*3/MM3 (ref 0–0.4)
EOSINOPHIL # BLD AUTO: 0.19 10*3/MM3 (ref 0–0.4)
EOSINOPHIL # BLD AUTO: 0.2 10*3/MM3 (ref 0–0.4)
EOSINOPHIL # BLD AUTO: 0.3 10*3/MM3 (ref 0–0.4)
EOSINOPHIL # BLD AUTO: 0.4 10*3/MM3 (ref 0–0.4)
EOSINOPHIL NFR BLD AUTO: 0.2 % (ref 0.3–6.2)
EOSINOPHIL NFR BLD AUTO: 3.3 % (ref 0.3–6.2)
EOSINOPHIL NFR BLD AUTO: 3.6 % (ref 0.3–6.2)
EOSINOPHIL NFR BLD AUTO: 4.4 % (ref 0.3–6.2)
EOSINOPHIL NFR BLD AUTO: 6.1 % (ref 0.3–6.2)
ERYTHROCYTE [DISTWIDTH] IN BLOOD BY AUTOMATED COUNT: 12.8 % (ref 12.3–15.4)
ERYTHROCYTE [DISTWIDTH] IN BLOOD BY AUTOMATED COUNT: 14.2 % (ref 12.3–15.4)
ERYTHROCYTE [DISTWIDTH] IN BLOOD BY AUTOMATED COUNT: 14.6 % (ref 12.3–15.4)
ERYTHROCYTE [DISTWIDTH] IN BLOOD BY AUTOMATED COUNT: 14.7 % (ref 12.3–15.4)
ERYTHROCYTE [DISTWIDTH] IN BLOOD BY AUTOMATED COUNT: 14.7 % (ref 12.3–15.4)
GFR SERPL CREATININE-BSD FRML MDRD: 71 ML/MIN/1.73
GFR SERPL CREATININE-BSD FRML MDRD: 84 ML/MIN/1.73
GFR SERPL CREATININE-BSD FRML MDRD: 92 ML/MIN/1.73
GLOBULIN UR ELPH-MCNC: 3.1 GM/DL
GLOBULIN UR ELPH-MCNC: 3.2 GM/DL
GLOBULIN UR ELPH-MCNC: 3.4 GM/DL
GLUCOSE BLDC GLUCOMTR-MCNC: 370 MG/DL (ref 74–100)
GLUCOSE BLDC GLUCOMTR-MCNC: 370 MG/DL (ref 74–100)
GLUCOSE SERPL-MCNC: 108 MG/DL (ref 65–99)
GLUCOSE SERPL-MCNC: 122 MG/DL (ref 65–99)
GLUCOSE SERPL-MCNC: 78 MG/DL (ref 65–99)
GLUCOSE SERPL-MCNC: 95 MG/DL (ref 65–99)
GLUCOSE SERPL-MCNC: 99 MG/DL (ref 65–99)
HCO3 BLDA-SCNC: 12.9 MMOL/L (ref 21–28)
HCT VFR BLD AUTO: 34.6 % (ref 34–46.6)
HCT VFR BLD AUTO: 36.2 % (ref 34–46.6)
HCT VFR BLD AUTO: 36.8 % (ref 34–46.6)
HCT VFR BLD AUTO: 37.8 % (ref 34–46.6)
HCT VFR BLD AUTO: 42.1 % (ref 34–46.6)
HCT VFR BLDA CALC: 48 % (ref 38–51)
HEMODILUTION: NO
HGB BLD-MCNC: 11.3 G/DL (ref 12–15.9)
HGB BLD-MCNC: 11.7 G/DL (ref 12–15.9)
HGB BLD-MCNC: 12.2 G/DL (ref 12–15.9)
HGB BLD-MCNC: 12.7 G/DL (ref 12–15.9)
HGB BLD-MCNC: 13.9 G/DL (ref 12–15.9)
HGB BLDA-MCNC: 16.3 G/DL (ref 12–17)
IMM GRANULOCYTES # BLD AUTO: 0.01 10*3/MM3 (ref 0–0.05)
IMM GRANULOCYTES NFR BLD AUTO: 0.2 % (ref 0–0.5)
INHALED O2 CONCENTRATION: 100 %
INR PPP: 0.98 (ref 0.93–1.1)
INR PPP: 0.98 (ref 0.93–1.1)
INR PPP: 1.04 (ref 0.93–1.1)
LV EF 2D ECHO EST: 60 %
LYMPHOCYTES # BLD AUTO: 0.8 10*3/MM3 (ref 0.7–3.1)
LYMPHOCYTES # BLD AUTO: 1 10*3/MM3 (ref 0.7–3.1)
LYMPHOCYTES # BLD AUTO: 1.2 10*3/MM3 (ref 0.7–3.1)
LYMPHOCYTES # BLD AUTO: 1.2 10*3/MM3 (ref 0.7–3.1)
LYMPHOCYTES # BLD AUTO: 1.39 10*3/MM3 (ref 0.7–3.1)
LYMPHOCYTES NFR BLD AUTO: 14 % (ref 19.6–45.3)
LYMPHOCYTES NFR BLD AUTO: 14.9 % (ref 19.6–45.3)
LYMPHOCYTES NFR BLD AUTO: 20.3 % (ref 19.6–45.3)
LYMPHOCYTES NFR BLD AUTO: 23.9 % (ref 19.6–45.3)
LYMPHOCYTES NFR BLD AUTO: 6.5 % (ref 19.6–45.3)
MAGNESIUM SERPL-MCNC: 2 MG/DL (ref 1.6–2.4)
MAXIMAL PREDICTED HEART RATE: 150 BPM
MAXIMAL PREDICTED HEART RATE: 151 BPM
MAXIMAL PREDICTED HEART RATE: 151 BPM
MCH RBC QN AUTO: 28.9 PG (ref 26.6–33)
MCH RBC QN AUTO: 30.1 PG (ref 26.6–33)
MCH RBC QN AUTO: 30.2 PG (ref 26.6–33)
MCH RBC QN AUTO: 30.7 PG (ref 26.6–33)
MCH RBC QN AUTO: 30.8 PG (ref 26.6–33)
MCHC RBC AUTO-ENTMCNC: 31.2 G/DL (ref 31.5–35.7)
MCHC RBC AUTO-ENTMCNC: 33 G/DL (ref 31.5–35.7)
MCHC RBC AUTO-ENTMCNC: 33.3 G/DL (ref 31.5–35.7)
MCHC RBC AUTO-ENTMCNC: 33.7 G/DL (ref 31.5–35.7)
MCHC RBC AUTO-ENTMCNC: 33.9 G/DL (ref 31.5–35.7)
MCV RBC AUTO: 90.6 FL (ref 79–97)
MCV RBC AUTO: 90.8 FL (ref 79–97)
MCV RBC AUTO: 91 FL (ref 79–97)
MCV RBC AUTO: 91.3 FL (ref 79–97)
MCV RBC AUTO: 92.6 FL (ref 79–97)
MODALITY: ABNORMAL
MONOCYTES # BLD AUTO: 0.54 10*3/MM3 (ref 0.1–0.9)
MONOCYTES # BLD AUTO: 0.6 10*3/MM3 (ref 0.1–0.9)
MONOCYTES # BLD AUTO: 0.7 10*3/MM3 (ref 0.1–0.9)
MONOCYTES # BLD AUTO: 0.8 10*3/MM3 (ref 0.1–0.9)
MONOCYTES # BLD AUTO: 1.1 10*3/MM3 (ref 0.1–0.9)
MONOCYTES NFR BLD AUTO: 10.6 % (ref 5–12)
MONOCYTES NFR BLD AUTO: 11.4 % (ref 5–12)
MONOCYTES NFR BLD AUTO: 8.3 % (ref 5–12)
MONOCYTES NFR BLD AUTO: 9.1 % (ref 5–12)
MONOCYTES NFR BLD AUTO: 9.3 % (ref 5–12)
MYELOPEROXIDASE AB SER IA-ACNC: <9 U/ML (ref 0–9)
NEUTROPHILS NFR BLD AUTO: 10.1 10*3/MM3 (ref 1.7–7)
NEUTROPHILS NFR BLD AUTO: 3.65 10*3/MM3 (ref 1.7–7)
NEUTROPHILS NFR BLD AUTO: 3.7 10*3/MM3 (ref 1.7–7)
NEUTROPHILS NFR BLD AUTO: 5 10*3/MM3 (ref 1.7–7)
NEUTROPHILS NFR BLD AUTO: 5.4 10*3/MM3 (ref 1.7–7)
NEUTROPHILS NFR BLD AUTO: 61.3 % (ref 42.7–76)
NEUTROPHILS NFR BLD AUTO: 62.6 % (ref 42.7–76)
NEUTROPHILS NFR BLD AUTO: 69.2 % (ref 42.7–76)
NEUTROPHILS NFR BLD AUTO: 73.4 % (ref 42.7–76)
NEUTROPHILS NFR BLD AUTO: 83.7 % (ref 42.7–76)
NRBC BLD AUTO-RTO: 0 /100 WBC (ref 0–0.2)
NRBC BLD AUTO-RTO: 0.1 /100 WBC (ref 0–0.2)
NT-PROBNP SERPL-MCNC: 2820 PG/ML (ref 0–900)
NT-PROBNP SERPL-MCNC: 7155 PG/ML (ref 0–900)
P-ANCA ATYPICAL TITR SER IF: NORMAL TITER
P-ANCA TITR SER IF: NORMAL TITER
PCO2 BLDA: 68.7 MM HG (ref 35–48)
PEEP RESPIRATORY: 5 CM[H2O]
PH BLDA: 6.88 PH UNITS (ref 7.35–7.45)
PLATELET # BLD AUTO: 296 10*3/MM3 (ref 140–450)
PLATELET # BLD AUTO: 301 10*3/MM3 (ref 140–450)
PLATELET # BLD AUTO: 310 10*3/MM3 (ref 140–450)
PLATELET # BLD AUTO: 312 10*3/MM3 (ref 140–450)
PLATELET # BLD AUTO: 343 10*3/MM3 (ref 140–450)
PMV BLD AUTO: 10.5 FL (ref 6–12)
PMV BLD AUTO: 8 FL (ref 6–12)
PMV BLD AUTO: 8.1 FL (ref 6–12)
PMV BLD AUTO: 8.1 FL (ref 6–12)
PMV BLD AUTO: 8.3 FL (ref 6–12)
PO2 BLDA: 293.7 MM HG (ref 83–108)
POTASSIUM BLDA-SCNC: 3.6 MMOL/L (ref 3.5–4.5)
POTASSIUM SERPL-SCNC: 3.5 MMOL/L (ref 3.5–5.2)
POTASSIUM SERPL-SCNC: 3.6 MMOL/L (ref 3.5–5.2)
PROT SERPL-MCNC: 6.8 G/DL (ref 6–8.5)
PROT SERPL-MCNC: 7.1 G/DL (ref 6–8.5)
PROT SERPL-MCNC: 7.6 G/DL (ref 6–8.5)
PROT SERPL-MCNC: 7.7 G/DL (ref 6–8.5)
PROTEINASE3 AB SER IA-ACNC: <3.5 U/ML (ref 0–3.5)
PROTHROMBIN TIME: 10.7 SECONDS (ref 9.6–11.7)
PROTHROMBIN TIME: 10.9 SECONDS (ref 9.6–11.7)
PROTHROMBIN TIME: 10.9 SECONDS (ref 9.6–11.7)
QT INTERVAL: 438 MS
QT INTERVAL: 471 MS
QT INTERVAL: 474 MS
RBC # BLD AUTO: 3.81 10*6/MM3 (ref 3.77–5.28)
RBC # BLD AUTO: 3.91 10*6/MM3 (ref 3.77–5.28)
RBC # BLD AUTO: 4.06 10*6/MM3 (ref 3.77–5.28)
RBC # BLD AUTO: 4.15 10*6/MM3 (ref 3.77–5.28)
RBC # BLD AUTO: 4.61 10*6/MM3 (ref 3.77–5.28)
RESPIRATORY RATE: 18
RHEUMATOID FACT SERPL-ACNC: 326.5 IU/ML
SAO2 % BLDCOA: 99.5 % (ref 94–98)
SARS-COV-2 ORF1AB RESP QL NAA+PROBE: NOT DETECTED
SARS-COV-2 RNA PNL SPEC NAA+PROBE: NOT DETECTED
SODIUM BLD-SCNC: 144 MMOL/L (ref 138–146)
SODIUM SERPL-SCNC: 137 MMOL/L (ref 136–145)
SODIUM SERPL-SCNC: 138 MMOL/L (ref 136–145)
SODIUM SERPL-SCNC: 138 MMOL/L (ref 136–145)
SODIUM SERPL-SCNC: 141 MMOL/L (ref 136–145)
SODIUM SERPL-SCNC: 142 MMOL/L (ref 136–145)
STRESS TARGET HR: 128 BPM
TROPONIN T SERPL-MCNC: <0.01 NG/ML (ref 0–0.03)
TSH SERPL DL<=0.05 MIU/L-ACNC: 2.81 UIU/ML (ref 0.27–4.2)
VENTILATOR MODE: ABNORMAL
VT ON VENT VENT: 450 ML
WBC NRBC COR # BLD: 12 10*3/MM3 (ref 3.4–10.8)
WBC NRBC COR # BLD: 5.82 10*3/MM3 (ref 3.4–10.8)
WBC NRBC COR # BLD: 6.1 10*3/MM3 (ref 3.4–10.8)
WBC NRBC COR # BLD: 6.8 10*3/MM3 (ref 3.4–10.8)
WBC NRBC COR # BLD: 7.9 10*3/MM3 (ref 3.4–10.8)
WHOLE BLOOD HOLD COAG: NORMAL

## 2022-01-01 PROCEDURE — 93306 TTE W/DOPPLER COMPLETE: CPT | Performed by: INTERNAL MEDICINE

## 2022-01-01 PROCEDURE — 85610 PROTHROMBIN TIME: CPT | Performed by: EMERGENCY MEDICINE

## 2022-01-01 PROCEDURE — 83880 ASSAY OF NATRIURETIC PEPTIDE: CPT | Performed by: NURSE PRACTITIONER

## 2022-01-01 PROCEDURE — 71250 CT THORAX DX C-: CPT

## 2022-01-01 PROCEDURE — 86431 RHEUMATOID FACTOR QUANT: CPT | Performed by: INTERNAL MEDICINE

## 2022-01-01 PROCEDURE — G0378 HOSPITAL OBSERVATION PER HR: HCPCS

## 2022-01-01 PROCEDURE — 80076 HEPATIC FUNCTION PANEL: CPT

## 2022-01-01 PROCEDURE — 94799 UNLISTED PULMONARY SVC/PX: CPT

## 2022-01-01 PROCEDURE — 25010000002 ENOXAPARIN PER 10 MG: Performed by: NURSE PRACTITIONER

## 2022-01-01 PROCEDURE — 85730 THROMBOPLASTIN TIME PARTIAL: CPT | Performed by: INTERNAL MEDICINE

## 2022-01-01 PROCEDURE — 85018 HEMOGLOBIN: CPT

## 2022-01-01 PROCEDURE — 80048 BASIC METABOLIC PNL TOTAL CA: CPT | Performed by: INTERNAL MEDICINE

## 2022-01-01 PROCEDURE — 71045 X-RAY EXAM CHEST 1 VIEW: CPT

## 2022-01-01 PROCEDURE — 85730 THROMBOPLASTIN TIME PARTIAL: CPT | Performed by: EMERGENCY MEDICINE

## 2022-01-01 PROCEDURE — 83880 ASSAY OF NATRIURETIC PEPTIDE: CPT | Performed by: EMERGENCY MEDICINE

## 2022-01-01 PROCEDURE — 86037 ANCA TITER EACH ANTIBODY: CPT | Performed by: INTERNAL MEDICINE

## 2022-01-01 PROCEDURE — G0463 HOSPITAL OUTPT CLINIC VISIT: HCPCS

## 2022-01-01 PROCEDURE — 25010000002 FUROSEMIDE PER 20 MG: Performed by: NURSE PRACTITIONER

## 2022-01-01 PROCEDURE — 71275 CT ANGIOGRAPHY CHEST: CPT

## 2022-01-01 PROCEDURE — 36415 COLL VENOUS BLD VENIPUNCTURE: CPT

## 2022-01-01 PROCEDURE — 94060 EVALUATION OF WHEEZING: CPT

## 2022-01-01 PROCEDURE — 93005 ELECTROCARDIOGRAM TRACING: CPT

## 2022-01-01 PROCEDURE — 80053 COMPREHEN METABOLIC PANEL: CPT | Performed by: INTERNAL MEDICINE

## 2022-01-01 PROCEDURE — U0004 COV-19 TEST NON-CDC HGH THRU: HCPCS

## 2022-01-01 PROCEDURE — 63710000001 ALBUTEROL SULFATE HFA 108 (90 BASE) MCG/ACT AEROSOL SOLUTION 6.7 G INHALER: Performed by: INTERNAL MEDICINE

## 2022-01-01 PROCEDURE — 87635 SARS-COV-2 COVID-19 AMP PRB: CPT | Performed by: NURSE PRACTITIONER

## 2022-01-01 PROCEDURE — 99233 SBSQ HOSP IP/OBS HIGH 50: CPT | Performed by: INTERNAL MEDICINE

## 2022-01-01 PROCEDURE — 86235 NUCLEAR ANTIGEN ANTIBODY: CPT | Performed by: INTERNAL MEDICINE

## 2022-01-01 PROCEDURE — U0005 INFEC AGEN DETEC AMPLI PROBE: HCPCS

## 2022-01-01 PROCEDURE — 25010000002 FENTANYL CITRATE (PF) 100 MCG/2ML SOLUTION: Performed by: INTERNAL MEDICINE

## 2022-01-01 PROCEDURE — 85025 COMPLETE CBC W/AUTO DIFF WBC: CPT | Performed by: INTERNAL MEDICINE

## 2022-01-01 PROCEDURE — 93005 ELECTROCARDIOGRAM TRACING: CPT | Performed by: INTERNAL MEDICINE

## 2022-01-01 PROCEDURE — C1769 GUIDE WIRE: HCPCS | Performed by: INTERNAL MEDICINE

## 2022-01-01 PROCEDURE — 99152 MOD SED SAME PHYS/QHP 5/>YRS: CPT | Performed by: INTERNAL MEDICINE

## 2022-01-01 PROCEDURE — 83735 ASSAY OF MAGNESIUM: CPT | Performed by: INTERNAL MEDICINE

## 2022-01-01 PROCEDURE — 80053 COMPREHEN METABOLIC PANEL: CPT | Performed by: NURSE PRACTITIONER

## 2022-01-01 PROCEDURE — 96375 TX/PRO/DX INJ NEW DRUG ADDON: CPT

## 2022-01-01 PROCEDURE — 25010000002 PROPOFOL 200 MG/20ML EMULSION: Performed by: ANESTHESIOLOGY

## 2022-01-01 PROCEDURE — 25010000002 ONDANSETRON PER 1 MG: Performed by: EMERGENCY MEDICINE

## 2022-01-01 PROCEDURE — 93306 TTE W/DOPPLER COMPLETE: CPT

## 2022-01-01 PROCEDURE — 99232 SBSQ HOSP IP/OBS MODERATE 35: CPT | Performed by: INTERNAL MEDICINE

## 2022-01-01 PROCEDURE — 36415 COLL VENOUS BLD VENIPUNCTURE: CPT | Performed by: FAMILY MEDICINE

## 2022-01-01 PROCEDURE — 85379 FIBRIN DEGRADATION QUANT: CPT | Performed by: EMERGENCY MEDICINE

## 2022-01-01 PROCEDURE — 86225 DNA ANTIBODY NATIVE: CPT | Performed by: INTERNAL MEDICINE

## 2022-01-01 PROCEDURE — 93320 DOPPLER ECHO COMPLETE: CPT

## 2022-01-01 PROCEDURE — 93005 ELECTROCARDIOGRAM TRACING: CPT | Performed by: FAMILY MEDICINE

## 2022-01-01 PROCEDURE — 93005 ELECTROCARDIOGRAM TRACING: CPT | Performed by: EMERGENCY MEDICINE

## 2022-01-01 PROCEDURE — 85610 PROTHROMBIN TIME: CPT | Performed by: INTERNAL MEDICINE

## 2022-01-01 PROCEDURE — 80053 COMPREHEN METABOLIC PANEL: CPT

## 2022-01-01 PROCEDURE — 94726 PLETHYSMOGRAPHY LUNG VOLUMES: CPT

## 2022-01-01 PROCEDURE — 84443 ASSAY THYROID STIM HORMONE: CPT | Performed by: INTERNAL MEDICINE

## 2022-01-01 PROCEDURE — 84484 ASSAY OF TROPONIN QUANT: CPT | Performed by: EMERGENCY MEDICINE

## 2022-01-01 PROCEDURE — 83520 IMMUNOASSAY QUANT NOS NONAB: CPT | Performed by: INTERNAL MEDICINE

## 2022-01-01 PROCEDURE — 85025 COMPLETE CBC W/AUTO DIFF WBC: CPT | Performed by: NURSE PRACTITIONER

## 2022-01-01 PROCEDURE — 93320 DOPPLER ECHO COMPLETE: CPT | Performed by: INTERNAL MEDICINE

## 2022-01-01 PROCEDURE — 93325 DOPPLER ECHO COLOR FLOW MAPG: CPT

## 2022-01-01 PROCEDURE — 93325 DOPPLER ECHO COLOR FLOW MAPG: CPT | Performed by: INTERNAL MEDICINE

## 2022-01-01 PROCEDURE — 84484 ASSAY OF TROPONIN QUANT: CPT | Performed by: FAMILY MEDICINE

## 2022-01-01 PROCEDURE — 82330 ASSAY OF CALCIUM: CPT

## 2022-01-01 PROCEDURE — 4A023N8 MEASUREMENT OF CARDIAC SAMPLING AND PRESSURE, BILATERAL, PERCUTANEOUS APPROACH: ICD-10-PCS | Performed by: INTERNAL MEDICINE

## 2022-01-01 PROCEDURE — 99285 EMERGENCY DEPT VISIT HI MDM: CPT

## 2022-01-01 PROCEDURE — 25010000002 MIDAZOLAM PER 1 MG: Performed by: INTERNAL MEDICINE

## 2022-01-01 PROCEDURE — 94640 AIRWAY INHALATION TREATMENT: CPT

## 2022-01-01 PROCEDURE — 25010000002 MORPHINE PER 10 MG: Performed by: EMERGENCY MEDICINE

## 2022-01-01 PROCEDURE — 31500 INSERT EMERGENCY AIRWAY: CPT | Performed by: NURSE PRACTITIONER

## 2022-01-01 PROCEDURE — 99205 OFFICE O/P NEW HI 60 MIN: CPT | Performed by: INTERNAL MEDICINE

## 2022-01-01 PROCEDURE — 93010 ELECTROCARDIOGRAM REPORT: CPT | Performed by: INTERNAL MEDICINE

## 2022-01-01 PROCEDURE — 80051 ELECTROLYTE PANEL: CPT

## 2022-01-01 PROCEDURE — A9270 NON-COVERED ITEM OR SERVICE: HCPCS | Performed by: INTERNAL MEDICINE

## 2022-01-01 PROCEDURE — 31500 INSERT EMERGENCY AIRWAY: CPT

## 2022-01-01 PROCEDURE — 85025 COMPLETE CBC W/AUTO DIFF WBC: CPT | Performed by: EMERGENCY MEDICINE

## 2022-01-01 PROCEDURE — C9803 HOPD COVID-19 SPEC COLLECT: HCPCS

## 2022-01-01 PROCEDURE — 82553 CREATINE MB FRACTION: CPT | Performed by: NURSE PRACTITIONER

## 2022-01-01 PROCEDURE — 86038 ANTINUCLEAR ANTIBODIES: CPT | Performed by: INTERNAL MEDICINE

## 2022-01-01 PROCEDURE — 80048 BASIC METABOLIC PNL TOTAL CA: CPT | Performed by: EMERGENCY MEDICINE

## 2022-01-01 PROCEDURE — 93460 R&L HRT ART/VENTRICLE ANGIO: CPT | Performed by: INTERNAL MEDICINE

## 2022-01-01 PROCEDURE — 82803 BLOOD GASES ANY COMBINATION: CPT

## 2022-01-01 PROCEDURE — 82550 ASSAY OF CK (CPK): CPT | Performed by: NURSE PRACTITIONER

## 2022-01-01 PROCEDURE — 93312 ECHO TRANSESOPHAGEAL: CPT | Performed by: INTERNAL MEDICINE

## 2022-01-01 PROCEDURE — 0 IOPAMIDOL PER 1 ML: Performed by: EMERGENCY MEDICINE

## 2022-01-01 PROCEDURE — 25010000002 EPINEPHRINE 1 MG/10ML SOLUTION PREFILLED SYRINGE: Performed by: NURSE PRACTITIONER

## 2022-01-01 PROCEDURE — 0 IOPAMIDOL PER 1 ML: Performed by: INTERNAL MEDICINE

## 2022-01-01 PROCEDURE — 93312 ECHO TRANSESOPHAGEAL: CPT

## 2022-01-01 PROCEDURE — 82962 GLUCOSE BLOOD TEST: CPT

## 2022-01-01 PROCEDURE — 25010000002 SULFUR HEXAFLUORIDE MICROSPH 60.7-25 MG RECONSTITUTED SUSPENSION: Performed by: INTERNAL MEDICINE

## 2022-01-01 PROCEDURE — 25010000002 METHYLPREDNISOLONE PER 40 MG: Performed by: NURSE PRACTITIONER

## 2022-01-01 PROCEDURE — C1894 INTRO/SHEATH, NON-LASER: HCPCS | Performed by: INTERNAL MEDICINE

## 2022-01-01 PROCEDURE — 99153 MOD SED SAME PHYS/QHP EA: CPT | Performed by: INTERNAL MEDICINE

## 2022-01-01 PROCEDURE — B2151ZZ FLUOROSCOPY OF LEFT HEART USING LOW OSMOLAR CONTRAST: ICD-10-PCS | Performed by: INTERNAL MEDICINE

## 2022-01-01 PROCEDURE — 94002 VENT MGMT INPAT INIT DAY: CPT

## 2022-01-01 PROCEDURE — B2111ZZ FLUOROSCOPY OF MULTIPLE CORONARY ARTERIES USING LOW OSMOLAR CONTRAST: ICD-10-PCS | Performed by: INTERNAL MEDICINE

## 2022-01-01 PROCEDURE — 94729 DIFFUSING CAPACITY: CPT

## 2022-01-01 PROCEDURE — 85025 COMPLETE CBC W/AUTO DIFF WBC: CPT

## 2022-01-01 PROCEDURE — 92950 HEART/LUNG RESUSCITATION CPR: CPT

## 2022-01-01 PROCEDURE — 96365 THER/PROPH/DIAG IV INF INIT: CPT

## 2022-01-01 PROCEDURE — 83605 ASSAY OF LACTIC ACID: CPT

## 2022-01-01 PROCEDURE — 84484 ASSAY OF TROPONIN QUANT: CPT | Performed by: NURSE PRACTITIONER

## 2022-01-01 PROCEDURE — 36600 WITHDRAWAL OF ARTERIAL BLOOD: CPT

## 2022-01-01 PROCEDURE — 99284 EMERGENCY DEPT VISIT MOD MDM: CPT

## 2022-01-01 RX ORDER — ONDANSETRON 2 MG/ML
4 INJECTION INTRAMUSCULAR; INTRAVENOUS ONCE
Status: COMPLETED | OUTPATIENT
Start: 2022-01-01 | End: 2022-01-01

## 2022-01-01 RX ORDER — SODIUM CHLORIDE 9 MG/ML
250 INJECTION, SOLUTION INTRAVENOUS ONCE AS NEEDED
Status: DISCONTINUED | OUTPATIENT
Start: 2022-01-01 | End: 2022-01-01 | Stop reason: HOSPADM

## 2022-01-01 RX ORDER — SODIUM CHLORIDE 9 MG/ML
INJECTION, SOLUTION INTRAVENOUS CONTINUOUS PRN
Status: DISCONTINUED | OUTPATIENT
Start: 2022-01-01 | End: 2022-01-01 | Stop reason: SURG

## 2022-01-01 RX ORDER — IPRATROPIUM BROMIDE AND ALBUTEROL SULFATE 2.5; .5 MG/3ML; MG/3ML
3 SOLUTION RESPIRATORY (INHALATION)
Status: DISCONTINUED | OUTPATIENT
Start: 2022-01-01 | End: 2022-01-01 | Stop reason: HOSPADM

## 2022-01-01 RX ORDER — PAROXETINE HYDROCHLORIDE 20 MG/1
20 TABLET, FILM COATED ORAL NIGHTLY
Status: DISCONTINUED | OUTPATIENT
Start: 2022-01-01 | End: 2022-01-01 | Stop reason: HOSPADM

## 2022-01-01 RX ORDER — SODIUM CHLORIDE 0.9 % (FLUSH) 0.9 %
10 SYRINGE (ML) INJECTION EVERY 12 HOURS SCHEDULED
Status: DISCONTINUED | OUTPATIENT
Start: 2022-01-01 | End: 2022-01-01 | Stop reason: HOSPADM

## 2022-01-01 RX ORDER — PROPOFOL 10 MG/ML
INJECTION, EMULSION INTRAVENOUS AS NEEDED
Status: DISCONTINUED | OUTPATIENT
Start: 2022-01-01 | End: 2022-01-01 | Stop reason: SURG

## 2022-01-01 RX ORDER — FUROSEMIDE 10 MG/ML
20 INJECTION INTRAMUSCULAR; INTRAVENOUS ONCE
Status: COMPLETED | OUTPATIENT
Start: 2022-01-01 | End: 2022-01-01

## 2022-01-01 RX ORDER — BUPIVACAINE HCL/0.9 % NACL/PF 0.25 %
.02-.3 PLASTIC BAG, INJECTION (ML) EPIDURAL CONTINUOUS PRN
Status: DISCONTINUED | OUTPATIENT
Start: 2022-01-01 | End: 2022-01-01 | Stop reason: HOSPADM

## 2022-01-01 RX ORDER — ACETAMINOPHEN 325 MG/1
650 TABLET ORAL EVERY 4 HOURS PRN
Status: DISCONTINUED | OUTPATIENT
Start: 2022-01-01 | End: 2022-01-01 | Stop reason: HOSPADM

## 2022-01-01 RX ORDER — EPINEPHRINE 0.1 MG/ML
SYRINGE (ML) INJECTION
Status: COMPLETED | OUTPATIENT
Start: 2022-01-01 | End: 2022-01-01

## 2022-01-01 RX ORDER — OLANZAPINE 10 MG/2ML
1 INJECTION, POWDER, LYOPHILIZED, FOR SOLUTION INTRAMUSCULAR
Status: DISCONTINUED | OUTPATIENT
Start: 2022-01-01 | End: 2022-01-01 | Stop reason: HOSPADM

## 2022-01-01 RX ORDER — ONDANSETRON 2 MG/ML
4 INJECTION INTRAMUSCULAR; INTRAVENOUS EVERY 6 HOURS PRN
Status: DISCONTINUED | OUTPATIENT
Start: 2022-01-01 | End: 2022-01-01 | Stop reason: HOSPADM

## 2022-01-01 RX ORDER — ONDANSETRON 4 MG/1
4 TABLET, FILM COATED ORAL EVERY 6 HOURS PRN
Status: DISCONTINUED | OUTPATIENT
Start: 2022-01-01 | End: 2022-01-01 | Stop reason: HOSPADM

## 2022-01-01 RX ORDER — POTASSIUM CHLORIDE 29.8 MG/ML
20 INJECTION INTRAVENOUS
Status: DISCONTINUED | OUTPATIENT
Start: 2022-01-01 | End: 2022-01-01 | Stop reason: HOSPADM

## 2022-01-01 RX ORDER — SODIUM CHLORIDE 0.9 % (FLUSH) 0.9 %
10 SYRINGE (ML) INJECTION AS NEEDED
Status: DISCONTINUED | OUTPATIENT
Start: 2022-01-01 | End: 2022-01-01 | Stop reason: HOSPADM

## 2022-01-01 RX ORDER — SODIUM BICARBONATE IN D5W 150/1000ML
150 PLASTIC BAG, INJECTION (ML) INTRAVENOUS CONTINUOUS
Status: DISCONTINUED | OUTPATIENT
Start: 2022-01-01 | End: 2022-01-01 | Stop reason: HOSPADM

## 2022-01-01 RX ORDER — SCOLOPAMINE TRANSDERMAL SYSTEM 1 MG/1
1 PATCH, EXTENDED RELEASE TRANSDERMAL
Status: CANCELLED | OUTPATIENT
Start: 2022-01-01

## 2022-01-01 RX ORDER — ACETAMINOPHEN 650 MG/1
650 SUPPOSITORY RECTAL EVERY 4 HOURS PRN
Status: CANCELLED | OUTPATIENT
Start: 2022-01-01

## 2022-01-01 RX ORDER — SODIUM CHLORIDE 0.9 % (FLUSH) 0.9 %
3-10 SYRINGE (ML) INJECTION AS NEEDED
Status: CANCELLED | OUTPATIENT
Start: 2022-01-01

## 2022-01-01 RX ORDER — VECURONIUM BROMIDE FOR INJECTION 1 MG/ML
0.1 INJECTION, POWDER, LYOPHILIZED, FOR SOLUTION INTRAVENOUS
Status: DISCONTINUED | OUTPATIENT
Start: 2022-01-01 | End: 2022-01-01 | Stop reason: HOSPADM

## 2022-01-01 RX ORDER — IBUPROFEN 200 MG
400 TABLET ORAL EVERY 6 HOURS PRN
COMMUNITY

## 2022-01-01 RX ORDER — ACETAMINOPHEN 325 MG/1
650 TABLET ORAL EVERY 4 HOURS PRN
Status: CANCELLED | OUTPATIENT
Start: 2022-01-01

## 2022-01-01 RX ORDER — METHYLPREDNISOLONE SODIUM SUCCINATE 40 MG/ML
40 INJECTION, POWDER, LYOPHILIZED, FOR SOLUTION INTRAMUSCULAR; INTRAVENOUS EVERY 12 HOURS
Status: DISCONTINUED | OUTPATIENT
Start: 2022-01-01 | End: 2022-01-01 | Stop reason: HOSPADM

## 2022-01-01 RX ORDER — METHYLPREDNISOLONE SODIUM SUCCINATE 40 MG/ML
40 INJECTION, POWDER, LYOPHILIZED, FOR SOLUTION INTRAMUSCULAR; INTRAVENOUS EVERY 12 HOURS
Status: DISCONTINUED | OUTPATIENT
Start: 2022-01-01 | End: 2022-01-01

## 2022-01-01 RX ORDER — GLYCOPYRROLATE 0.2 MG/ML
0.4 INJECTION INTRAMUSCULAR; INTRAVENOUS
Status: CANCELLED | OUTPATIENT
Start: 2022-01-01

## 2022-01-01 RX ORDER — DIPHENOXYLATE HYDROCHLORIDE AND ATROPINE SULFATE 2.5; .025 MG/1; MG/1
1 TABLET ORAL
Status: CANCELLED | OUTPATIENT
Start: 2022-01-01

## 2022-01-01 RX ORDER — LISINOPRIL 5 MG/1
5 TABLET ORAL
Qty: 30 TABLET | Refills: 0 | Status: SHIPPED | OUTPATIENT
Start: 2022-01-01

## 2022-01-01 RX ORDER — FUROSEMIDE 10 MG/ML
INJECTION INTRAMUSCULAR; INTRAVENOUS
Status: DISPENSED
Start: 2022-01-01 | End: 2022-01-01

## 2022-01-01 RX ORDER — FUROSEMIDE 40 MG/1
40 TABLET ORAL DAILY
Status: DISCONTINUED | OUTPATIENT
Start: 2022-01-01 | End: 2022-01-01 | Stop reason: HOSPADM

## 2022-01-01 RX ORDER — ATROPINE SULFATE 10 MG/ML
2 SOLUTION/ DROPS OPHTHALMIC 2 TIMES DAILY PRN
Status: CANCELLED | OUTPATIENT
Start: 2022-01-01

## 2022-01-01 RX ORDER — SILDENAFIL CITRATE 20 MG/1
20 TABLET ORAL EVERY 8 HOURS SCHEDULED
Status: DISCONTINUED | OUTPATIENT
Start: 2022-01-01 | End: 2022-01-01 | Stop reason: HOSPADM

## 2022-01-01 RX ORDER — ALBUTEROL SULFATE 90 UG/1
2 AEROSOL, METERED RESPIRATORY (INHALATION) ONCE
Status: COMPLETED | OUTPATIENT
Start: 2022-01-01 | End: 2022-01-01

## 2022-01-01 RX ORDER — ACETAMINOPHEN 160 MG/5ML
650 SOLUTION ORAL EVERY 4 HOURS PRN
Status: CANCELLED | OUTPATIENT
Start: 2022-01-01

## 2022-01-01 RX ORDER — LORAZEPAM 2 MG/ML
2 INJECTION INTRAMUSCULAR
Status: CANCELLED | OUTPATIENT
Start: 2022-01-01 | End: 2022-09-22

## 2022-01-01 RX ORDER — DIPHENHYDRAMINE HCL 25 MG
25 CAPSULE ORAL EVERY 6 HOURS PRN
Status: DISCONTINUED | OUTPATIENT
Start: 2022-01-01 | End: 2022-01-01 | Stop reason: HOSPADM

## 2022-01-01 RX ORDER — SODIUM CHLORIDE 0.9 % (FLUSH) 0.9 %
3 SYRINGE (ML) INJECTION EVERY 12 HOURS SCHEDULED
Status: DISCONTINUED | OUTPATIENT
Start: 2022-01-01 | End: 2022-01-01 | Stop reason: HOSPADM

## 2022-01-01 RX ORDER — SILDENAFIL CITRATE 20 MG/1
20 TABLET ORAL EVERY 8 HOURS SCHEDULED
Qty: 90 TABLET | Refills: 5 | Status: SHIPPED | OUTPATIENT
Start: 2022-01-01

## 2022-01-01 RX ORDER — GLUCOSAMINE/D3/BOSWELLIA SERRA 1500MG-400
1000 TABLET ORAL DAILY
COMMUNITY

## 2022-01-01 RX ORDER — SODIUM CHLORIDE 0.9 % (FLUSH) 0.9 %
3-10 SYRINGE (ML) INJECTION AS NEEDED
Status: DISCONTINUED | OUTPATIENT
Start: 2022-01-01 | End: 2022-01-01 | Stop reason: HOSPADM

## 2022-01-01 RX ORDER — ZOLPIDEM TARTRATE 10 MG/1
10 TABLET ORAL NIGHTLY PRN
COMMUNITY

## 2022-01-01 RX ORDER — VITAMIN E 268 MG
400 CAPSULE ORAL DAILY
COMMUNITY

## 2022-01-01 RX ORDER — OXYMETAZOLINE HYDROCHLORIDE OPHTHALMIC 1 MG/ML
1 SOLUTION/ DROPS OPHTHALMIC 2 TIMES DAILY
COMMUNITY
Start: 2022-01-01 | End: 2022-01-01

## 2022-01-01 RX ORDER — ATORVASTATIN CALCIUM 10 MG/1
10 TABLET, FILM COATED ORAL DAILY
Refills: 5 | Status: DISCONTINUED | OUTPATIENT
Start: 2022-01-01 | End: 2022-01-01 | Stop reason: HOSPADM

## 2022-01-01 RX ORDER — LANOLIN ALCOHOL/MO/W.PET/CERES
1000 CREAM (GRAM) TOPICAL DAILY
COMMUNITY
End: 2022-01-01

## 2022-01-01 RX ORDER — CHOLECALCIFEROL (VITAMIN D3) 125 MCG
5 CAPSULE ORAL NIGHTLY PRN
Status: DISCONTINUED | OUTPATIENT
Start: 2022-01-01 | End: 2022-01-01 | Stop reason: HOSPADM

## 2022-01-01 RX ORDER — LISINOPRIL 5 MG/1
5 TABLET ORAL
Status: DISCONTINUED | OUTPATIENT
Start: 2022-01-01 | End: 2022-01-01 | Stop reason: HOSPADM

## 2022-01-01 RX ORDER — ASPIRIN 81 MG/1
324 TABLET, CHEWABLE ORAL ONCE
Status: COMPLETED | OUTPATIENT
Start: 2022-01-01 | End: 2022-01-01

## 2022-01-01 RX ORDER — NICOTINE POLACRILEX 4 MG
15 LOZENGE BUCCAL
Status: DISCONTINUED | OUTPATIENT
Start: 2022-01-01 | End: 2022-01-01 | Stop reason: HOSPADM

## 2022-01-01 RX ORDER — SIMVASTATIN 20 MG
20 TABLET ORAL NIGHTLY
COMMUNITY

## 2022-01-01 RX ORDER — ZOLPIDEM TARTRATE 5 MG/1
10 TABLET ORAL NIGHTLY PRN
Status: DISCONTINUED | OUTPATIENT
Start: 2022-01-01 | End: 2022-01-01 | Stop reason: HOSPADM

## 2022-01-01 RX ORDER — NITROGLYCERIN 0.4 MG/1
0.4 TABLET SUBLINGUAL
Status: DISCONTINUED | OUTPATIENT
Start: 2022-01-01 | End: 2022-01-01

## 2022-01-01 RX ORDER — EPHEDRINE SULFATE 5 MG/ML
INJECTION INTRAVENOUS AS NEEDED
Status: DISCONTINUED | OUTPATIENT
Start: 2022-01-01 | End: 2022-01-01 | Stop reason: SURG

## 2022-01-01 RX ORDER — HYDRALAZINE HYDROCHLORIDE 20 MG/ML
10 INJECTION INTRAMUSCULAR; INTRAVENOUS EVERY 6 HOURS PRN
Status: DISCONTINUED | OUTPATIENT
Start: 2022-01-01 | End: 2022-01-01 | Stop reason: HOSPADM

## 2022-01-01 RX ORDER — DEXTROSE MONOHYDRATE 25 G/50ML
10-50 INJECTION, SOLUTION INTRAVENOUS
Status: DISCONTINUED | OUTPATIENT
Start: 2022-01-01 | End: 2022-01-01 | Stop reason: HOSPADM

## 2022-01-01 RX ORDER — SODIUM CHLORIDE 0.9 % (FLUSH) 0.9 %
3 SYRINGE (ML) INJECTION EVERY 12 HOURS SCHEDULED
Status: CANCELLED | OUTPATIENT
Start: 2022-01-01

## 2022-01-01 RX ORDER — BOSENTAN 62.5 MG/1
TABLET, FILM COATED ORAL
Qty: 60 TABLET | Refills: 6 | Status: SHIPPED | OUTPATIENT
Start: 2022-01-01 | End: 2022-01-01

## 2022-01-01 RX ORDER — FENTANYL CITRATE 50 UG/ML
INJECTION, SOLUTION INTRAMUSCULAR; INTRAVENOUS AS NEEDED
Status: DISCONTINUED | OUTPATIENT
Start: 2022-01-01 | End: 2022-01-01 | Stop reason: HOSPADM

## 2022-01-01 RX ORDER — MIDAZOLAM HYDROCHLORIDE 1 MG/ML
INJECTION INTRAMUSCULAR; INTRAVENOUS AS NEEDED
Status: DISCONTINUED | OUTPATIENT
Start: 2022-01-01 | End: 2022-01-01 | Stop reason: HOSPADM

## 2022-01-01 RX ORDER — SILDENAFIL CITRATE 20 MG/1
20 TABLET ORAL EVERY 8 HOURS SCHEDULED
Qty: 90 TABLET | Refills: 0 | Status: SHIPPED | OUTPATIENT
Start: 2022-01-01 | End: 2022-01-01 | Stop reason: SDUPTHER

## 2022-01-01 RX ORDER — PANTOPRAZOLE SODIUM 40 MG/1
40 TABLET, DELAYED RELEASE ORAL EVERY MORNING
Status: DISCONTINUED | OUTPATIENT
Start: 2022-01-01 | End: 2022-01-01 | Stop reason: HOSPADM

## 2022-01-01 RX ORDER — FUROSEMIDE 40 MG/1
40 TABLET ORAL DAILY
Qty: 30 TABLET | Refills: 0 | Status: SHIPPED | OUTPATIENT
Start: 2022-01-01

## 2022-01-01 RX ORDER — FUROSEMIDE 10 MG/ML
80 INJECTION INTRAMUSCULAR; INTRAVENOUS ONCE
Status: COMPLETED | OUTPATIENT
Start: 2022-01-01 | End: 2022-01-01

## 2022-01-01 RX ORDER — CARBOXYMETHYLCELLULOSE SODIUM 10 MG/ML
1 GEL OPHTHALMIC
Status: CANCELLED | OUTPATIENT
Start: 2022-01-01

## 2022-01-01 RX ADMIN — Medication 5 MG: at 18:32

## 2022-01-01 RX ADMIN — SODIUM CHLORIDE, PRESERVATIVE FREE 3 ML: 5 INJECTION INTRAVENOUS at 08:23

## 2022-01-01 RX ADMIN — METHYLPREDNISOLONE SODIUM SUCCINATE 40 MG: 40 INJECTION, POWDER, FOR SOLUTION INTRAMUSCULAR; INTRAVENOUS at 09:03

## 2022-01-01 RX ADMIN — ATORVASTATIN CALCIUM 10 MG: 10 TABLET, FILM COATED ORAL at 08:23

## 2022-01-01 RX ADMIN — SODIUM CHLORIDE, PRESERVATIVE FREE 3 ML: 5 INJECTION INTRAVENOUS at 20:15

## 2022-01-01 RX ADMIN — PAROXETINE HYDROCHLORIDE 20 MG: 20 TABLET, FILM COATED ORAL at 21:03

## 2022-01-01 RX ADMIN — FUROSEMIDE 40 MG: 40 TABLET ORAL at 08:23

## 2022-01-01 RX ADMIN — PANTOPRAZOLE SODIUM 40 MG: 40 TABLET, DELAYED RELEASE ORAL at 08:30

## 2022-01-01 RX ADMIN — EPHEDRINE SULFATE 10 MG: 5 INJECTION INTRAVENOUS at 09:47

## 2022-01-01 RX ADMIN — ENOXAPARIN SODIUM 40 MG: 40 INJECTION SUBCUTANEOUS at 15:15

## 2022-01-01 RX ADMIN — ZOLPIDEM TARTRATE 10 MG: 5 TABLET ORAL at 00:04

## 2022-01-01 RX ADMIN — SODIUM BICARBONATE 100 MEQ: 84 INJECTION, SOLUTION INTRAVENOUS at 12:53

## 2022-01-01 RX ADMIN — LISINOPRIL 5 MG: 5 TABLET ORAL at 08:23

## 2022-01-01 RX ADMIN — PAROXETINE HYDROCHLORIDE 20 MG: 20 TABLET, FILM COATED ORAL at 21:59

## 2022-01-01 RX ADMIN — SODIUM CHLORIDE: 0.9 INJECTION, SOLUTION INTRAVENOUS at 09:34

## 2022-01-01 RX ADMIN — ALBUTEROL SULFATE 2 PUFF: 108 INHALANT RESPIRATORY (INHALATION) at 08:45

## 2022-01-01 RX ADMIN — Medication 0.05 MCG/KG/MIN: at 13:04

## 2022-01-01 RX ADMIN — ONDANSETRON 4 MG: 2 INJECTION INTRAMUSCULAR; INTRAVENOUS at 06:48

## 2022-01-01 RX ADMIN — ZOLPIDEM TARTRATE 10 MG: 5 TABLET ORAL at 21:27

## 2022-01-01 RX ADMIN — FUROSEMIDE 40 MG: 40 TABLET ORAL at 08:03

## 2022-01-01 RX ADMIN — SULFUR HEXAFLUORIDE 2 ML: KIT at 14:58

## 2022-01-01 RX ADMIN — FUROSEMIDE 40 MG: 40 TABLET ORAL at 18:32

## 2022-01-01 RX ADMIN — FUROSEMIDE 80 MG: 10 INJECTION, SOLUTION INTRAMUSCULAR; INTRAVENOUS at 10:44

## 2022-01-01 RX ADMIN — FUROSEMIDE 40 MG: 40 TABLET ORAL at 08:30

## 2022-01-01 RX ADMIN — SILDENAFIL 20 MG: 20 TABLET, FILM COATED ORAL at 13:41

## 2022-01-01 RX ADMIN — ATORVASTATIN CALCIUM 10 MG: 10 TABLET, FILM COATED ORAL at 08:03

## 2022-01-01 RX ADMIN — SODIUM CHLORIDE, PRESERVATIVE FREE 3 ML: 5 INJECTION INTRAVENOUS at 08:30

## 2022-01-01 RX ADMIN — SODIUM CHLORIDE, PRESERVATIVE FREE 3 ML: 5 INJECTION INTRAVENOUS at 21:59

## 2022-01-01 RX ADMIN — SODIUM CHLORIDE, PRESERVATIVE FREE 3 ML: 5 INJECTION INTRAVENOUS at 15:12

## 2022-01-01 RX ADMIN — FUROSEMIDE 20 MG: 10 INJECTION, SOLUTION INTRAMUSCULAR; INTRAVENOUS at 09:03

## 2022-01-01 RX ADMIN — SODIUM CHLORIDE, PRESERVATIVE FREE 3 ML: 5 INJECTION INTRAVENOUS at 21:03

## 2022-01-01 RX ADMIN — EPINEPHRINE 1 MG: 0.1 INJECTION, SOLUTION ENDOTRACHEAL; INTRACARDIAC; INTRAVENOUS at 12:22

## 2022-01-01 RX ADMIN — IPRATROPIUM BROMIDE AND ALBUTEROL SULFATE 3 ML: .5; 3 SOLUTION RESPIRATORY (INHALATION) at 09:57

## 2022-01-01 RX ADMIN — SODIUM CHLORIDE, PRESERVATIVE FREE 3 ML: 5 INJECTION INTRAVENOUS at 21:20

## 2022-01-01 RX ADMIN — ATORVASTATIN CALCIUM 10 MG: 10 TABLET, FILM COATED ORAL at 08:30

## 2022-01-01 RX ADMIN — IOPAMIDOL 100 ML: 755 INJECTION, SOLUTION INTRAVENOUS at 06:42

## 2022-01-01 RX ADMIN — ZOLPIDEM TARTRATE 10 MG: 5 TABLET ORAL at 21:03

## 2022-01-01 RX ADMIN — SODIUM CHLORIDE, PRESERVATIVE FREE 3 ML: 5 INJECTION INTRAVENOUS at 08:03

## 2022-01-01 RX ADMIN — ATORVASTATIN CALCIUM 10 MG: 10 TABLET, FILM COATED ORAL at 15:14

## 2022-01-01 RX ADMIN — SODIUM BICARBONATE 50 MEQ: 84 INJECTION, SOLUTION INTRAVENOUS at 12:27

## 2022-01-01 RX ADMIN — PAROXETINE HYDROCHLORIDE 20 MG: 20 TABLET, FILM COATED ORAL at 21:20

## 2022-01-01 RX ADMIN — NITROGLYCERIN 1 INCH: 20 OINTMENT TOPICAL at 10:44

## 2022-01-01 RX ADMIN — PANTOPRAZOLE SODIUM 40 MG: 40 TABLET, DELAYED RELEASE ORAL at 06:22

## 2022-01-01 RX ADMIN — MORPHINE SULFATE 4 MG: 4 INJECTION INTRAVENOUS at 06:48

## 2022-01-01 RX ADMIN — PANTOPRAZOLE SODIUM 40 MG: 40 TABLET, DELAYED RELEASE ORAL at 06:04

## 2022-01-01 RX ADMIN — ASPIRIN 81 MG CHEWABLE TABLET 324 MG: 81 TABLET CHEWABLE at 10:43

## 2022-01-01 RX ADMIN — EPINEPHRINE 1 MG: 0.1 INJECTION, SOLUTION ENDOTRACHEAL; INTRACARDIAC; INTRAVENOUS at 12:26

## 2022-01-01 RX ADMIN — ENOXAPARIN SODIUM 40 MG: 40 INJECTION SUBCUTANEOUS at 15:06

## 2022-01-01 RX ADMIN — PROPOFOL 250 MG: 10 INJECTION, EMULSION INTRAVENOUS at 09:34

## 2022-01-01 RX ADMIN — LISINOPRIL 5 MG: 5 TABLET ORAL at 08:30

## 2022-01-01 RX ADMIN — LISINOPRIL 5 MG: 5 TABLET ORAL at 15:14

## 2022-01-01 RX ADMIN — PAROXETINE HYDROCHLORIDE 20 MG: 20 TABLET, FILM COATED ORAL at 20:15

## 2022-01-01 RX ADMIN — EPHEDRINE SULFATE 10 MG: 5 INJECTION INTRAVENOUS at 09:55

## 2022-01-01 RX ADMIN — PANTOPRAZOLE SODIUM 40 MG: 40 TABLET, DELAYED RELEASE ORAL at 08:23

## 2022-01-01 RX ADMIN — ZOLPIDEM TARTRATE 10 MG: 5 TABLET ORAL at 21:06

## 2022-01-01 RX ADMIN — EPINEPHRINE 1 MG: 0.1 INJECTION, SOLUTION ENDOTRACHEAL; INTRACARDIAC; INTRAVENOUS at 12:20

## 2022-01-05 PROBLEM — I20.8 STABLE ANGINA PECTORIS: Status: ACTIVE | Noted: 2022-01-01

## 2022-01-05 NOTE — H&P
Patient Care Team:  Garett Barrientos MD as PCP - General    Chief complaint Chest pain     Subjective     Patient is a 71 y.o. female who presents with medical history of GERD, degenerative disc disease, hyperlipidemia, anxiety and depression.  Patient states that she has been having intermittent chest discomfort for over 2 months and that is was relieved with Tylenol.  However in the last 2 weeks her shortness of breath has gotten worse and at times she has to sit up in bed.  She denies any nausea vomiting or diarrhea. She does not smoke, and does not drink. She state that she has had an EGD and colonoscopy with EGD she did have some dilation for swallowing difficulties. She states that she woke up this morning at 4 AM with chest tightness that radiated down her left arm and she could not catch her breath so she decided to come into the emergency department. She states that she has had cardiac stress test in the past by Dr. Paige.  In the ED she had a negative troponin EKG with no acute ischemia, and chest x-ray revealed cardiomegaly.  Patient did have an elevated BNP of over 2800 she was given 80 mg IV Lasix.  She states that she has been vaccinated for COVID-19 but has not received the booster at that day for total February CBC and CMP was unremarkable.  She will be admitted for further evaluation and treatment.    Review of Systems   Constitutional: Positive for activity change, appetite change and fatigue. Negative for chills, diaphoresis, fever and unexpected weight change.   HENT: Negative.    Respiratory: Positive for chest tightness and shortness of breath. Negative for choking and wheezing.    Cardiovascular: Positive for leg swelling.          History  Past Medical History:   Diagnosis Date   • Anxiety and depression    • Arthritis    • DDD (degenerative disc disease), lumbar    • Diverticulosis    • GERD (gastroesophageal reflux disease)    • Hyperlipidemia    • PONV (postoperative nausea and vomiting)       Past Surgical History:   Procedure Laterality Date   • BLEPHAROPLASTY Bilateral 3/2/2021    Procedure: BILATERAL UPPER LID BLEPHAROPLASY;  Surgeon: Stanley Fulton MD;  [place] [facility] [place] [facility] [place]  Service: Ophthalmology;  Laterality: Bilateral;   • BROW LIFT Bilateral 9/23/2021    Procedure: BILATERAL TEMPORAL DIRECT BROW LIFT, LEFT UPPER LID MULLERECTOMY;  Surgeon: Stanley Fulton MD;  [place] [facility] [place] [facility] [place]  Service: Ophthalmology;  Laterality: Bilateral;   • BUNIONECTOMY Right    • COLONOSCOPY     • ENDOSCOPY     • FACIAL COSMETIC SURGERY  06/2021   • HYSTERECTOMY  1990'S   • KNEE ARTHROPLASTY Bilateral 2013 2017   • LUMBAR DISC SURGERY  1990'S   • TONSILLECTOMY       Family History   Problem Relation Age of Onset   • Heart disease Mother    • Cancer Father    • Malig Hyperthermia Neg Hx      Social History     Tobacco Use   • Smoking status: Never Smoker   • Smokeless tobacco: Never Used   Substance Use Topics   • Alcohol use: Yes     Comment: SOCIAL USE   • Drug use: Never     Medications Prior to Admission   Medication Sig Dispense Refill Last Dose   • omeprazole (priLOSEC) 20 MG capsule Take 20 mg by mouth Daily.   1/4/2022 at Unknown time   • PARoxetine (PAXIL) 10 MG tablet Take 20 mg by mouth Every Night.   1/4/2022 at Unknown time   • simvastatin (ZOCOR) 10 MG tablet Take 20 mg by mouth Every Night.  5 1/4/2022 at Unknown time   • zolpidem (AMBIEN) 10 MG tablet Take 10 mg by mouth At Night As Needed.  3 1/4/2022 at Unknown time   • erythromycin (ROMYCIN) 5 MG/GM ophthalmic ointment Administer  to both eyes 2 (two) times a day for 7 days (Patient not taking: Reported on 1/5/2022) 3.5 g 1 Not Taking at Unknown time   • ondansetron (Zofran) 4 MG tablet Take 1 tablet by mouth Every 8 (Eight) Hours As Needed for Nausea or Vomiting. (Patient not taking: Reported on 1/5/2022) 10 tablet 0 Not Taking at Unknown time     Allergies:  Patient has no known allergies.    Objective     Vital  Signs  Temp:  [96 °F (35.6 °C)-97.8 °F (36.6 °C)] 97.8 °F (36.6 °C)  Heart Rate:  [64-72] 70  Resp:  [16-23] 16  BP: (144-180)/(72-87) 144/72       Physical Exam     Results Review:     Imaging Results (Last 24 Hours)     Procedure Component Value Units Date/Time    XR Chest 1 View [[id number]] Collected: 01/05/22 0944     Updated: 01/05/22 0949    Narrative:      DATE OF EXAM:  1/5/2022 9:20 AM     PROCEDURE:  XR CHEST 1 VW-     INDICATIONS:  chest pain; I20.8-Other forms of angina pectoris       COMPARISON:  PA and lateral chest radiograph 12/26/2016     TECHNIQUE:   Single radiographic view of the chest was obtained.     FINDINGS:  Heart size appears mildly enlarged. This may be accentuated by the  portable technique. Pulmonary vascular distribution is normal. The lungs  are clear. There are no acute osseous abnormalities. There is no pleural  effusion or pneumothorax. A calcified granuloma projects of the right  upper quadrant the abdomen.       Impression:         1. Heart size appears larger than on the 2016 comparison. This could be  accentuated by the portable technique.  2. Otherwise, no acute chest findings.     Electronically Signed By-Radha Eddy MD On:1/5/2022 9:47 AM  This report was finalized on [id number] by  Radha Eddy MD.           Lab Results (last 24 hours)     Procedure Component Value Units Date/Time    COVID PRE-OP / PRE-PROCEDURE SCREENING ORDER (NO ISOLATION) - Swab, Nasopharynx [[id number]]  (Normal) Collected: 01/05/22 1006    Specimen: Swab from Nasopharynx Updated: 01/05/22 1035    Narrative:      The following orders were created for panel order COVID PRE-OP / PRE-PROCEDURE SCREENING ORDER (NO ISOLATION) - Swab, Nasopharynx.  Procedure                               Abnormality         Status                     ---------                               -----------         ------                     COVID-19,CEPHEID/VAHE,CO...[[id number]]  Normal              Final result                  Please view results for these tests on the individual orders.    COVID-19,CEPHEID/VAHE,COR/BRANDI/PAD/MONA IN-HOUSE(OR EMERGENT/ADD-ON),NP SWAB IN TRANSPORT MEDIA 3-4 HR TAT, RT-PCR - Swab, Nasopharynx [[id number]]  (Normal) Collected: 01/05/22 1006    Specimen: Swab from Nasopharynx Updated: 01/05/22 1035     COVID19 Not Detected    Narrative:      Fact sheet for providers: https://www.fda.gov/media/936457/download     Fact sheet for patients: https://www.fda.gov/media/646020/download  Fact sheet for providers: https://www.fda.gov/media/393858/download    Fact sheet for patients: https://www.fda.gov/media/935747/download    Test performed by PCR.    Comprehensive Metabolic Panel [[id number]] Collected: 01/05/22 0915    Specimen: Blood from Arm, Left Updated: 01/05/22 0953     Glucose 95 mg/dL      BUN 12 mg/dL      Creatinine 0.64 mg/dL      Sodium 141 mmol/L      Potassium 3.5 mmol/L      Chloride 105 mmol/L      CO2 23.0 mmol/L      Calcium 9.4 mg/dL      Total Protein 6.8 g/dL      Albumin 3.70 g/dL      ALT (SGPT) 31 U/L      AST (SGOT) 20 U/L      Alkaline Phosphatase 99 U/L      Total Bilirubin 0.2 mg/dL      eGFR Non African Amer 92 mL/min/1.73      Globulin 3.1 gm/dL      A/G Ratio 1.2 g/dL      BUN/Creatinine Ratio 18.8     Anion Gap 13.0 mmol/L     Narrative:      GFR Normal >60  Chronic Kidney Disease <60  Kidney Failure <15      Troponin [[id number]]  (Normal) Collected: 01/05/22 0915    Specimen: Blood from Arm, Left Updated: 01/05/22 0953     Troponin T <0.010 ng/mL     Narrative:      Troponin T Reference Range:  <= 0.03 ng/mL-   Negative for AMI  >0.03 ng/mL-     Abnormal for myocardial necrosis.  Clinicians would have to utilize clinical acumen, EKG, Troponin and serial changes to determine if it is an Acute Myocardial Infarction or myocardial injury due to an underlying chronic condition.       Results may be falsely decreased if patient taking Biotin.      BNP [[id number]]  (Abnormal) Collected:  01/05/22 0915    Specimen: Blood from Arm, Left Updated: 01/05/22 0951     proBNP 2,820.0 pg/mL     Narrative:      Among patients with dyspnea, NT-proBNP is highly sensitive for the detection of acute congestive heart failure. In addition NT-proBNP of <300 pg/ml effectively rules out acute congestive heart failure with 99% negative predictive value.    Results may be falsely decreased if patient taking Biotin.      Extra Tubes [[id number]] Collected: 01/05/22 0915    Specimen: Blood from Arm, Left Updated: 01/05/22 0937    Narrative:      The following orders were created for panel order Extra Tubes.  Procedure                               Abnormality         Status                     ---------                               -----------         ------                     Gold Top - SST[[id number]]                                   Final result                 Please view results for these tests on the individual orders.    Gold Top - SST [[id number]] Collected: 01/05/22 0915    Specimen: Blood from Arm, Left Updated: 01/05/22 0937    CBC & Differential [[id number]]  (Abnormal) Collected: 01/05/22 0915    Specimen: Blood from Arm, Left Updated: 01/05/22 0924    Narrative:      The following orders were created for panel order CBC & Differential.  Procedure                               Abnormality         Status                     ---------                               -----------         ------                     CBC Auto Differential[[id number]]        Abnormal            Final result                 Please view results for these tests on the individual orders.    CBC Auto Differential [[id number]]  (Abnormal) Collected: 01/05/22 0915    Specimen: Blood from Arm, Left Updated: 01/05/22 0924     WBC 6.80 10*3/mm3      RBC 3.81 10*6/mm3      Hemoglobin 11.7 g/dL      Hematocrit 34.6 %      MCV 90.8 fL      MCH 30.8 pg      MCHC 33.9 g/dL      RDW 14.7 %      RDW-SD 47.3 fl      MPV 8.0 fL      Platelets 301 10*3/mm3       Neutrophil % 73.4 %      Lymphocyte % 14.0 %      Monocyte % 8.3 %      Eosinophil % 3.6 %      Basophil % 0.7 %      Neutrophils, Absolute 5.00 10*3/mm3      Lymphocytes, Absolute 1.00 10*3/mm3      Monocytes, Absolute 0.60 10*3/mm3      Eosinophils, Absolute 0.20 10*3/mm3      Basophils, Absolute 0.00 10*3/mm3      nRBC 0.0 /100 WBC            I reviewed the patient's new clinical results.    Assessment/Plan     Chest pain  -troponin neg  -EKG neg for ischemia  -ECHO  -cardiology consult    Dyspnea  -CXR with cardiomegaly  -given 80 lasix in ED  -elevated probnp 2800    Hypertension  -start lisinopril  -hydralazine prn    HDL  -statin    Mood disorder  -continue home medication    Insomnia  -continue home medication    Diet: HH  DVT prophylaxis: lovenox  GI prophylaxis: protonix  Code status: full      I discussed the patient's findings and my recommendations with patient.     Vita Harrell, APRN  01/05/22  13:58 EST

## 2022-01-05 NOTE — ED PROVIDER NOTES
"Subjective   71-year-old  female presents to the emergency room with complaint of chest pain that has been coming and going for the past few months.  Patient states that these \"chest pain spells\" have been increasing in frequency and lasting longer over the past few weeks.  Patient states that yesterday and into today the pains have been more frequent and worse and even woke her out of his sleep at 4 AM this morning.  Patient denies fever cough or congestion.  Patient states the pain is radiating into the left lower chest and left shoulder blade area.  Patient also reports diaphoresis with these chest pain episodes.  Patient reports increased shortness of breath especially on exertion.  Patient states that she takes vitamins but also paroxetine for anxiety zolpidem for sleep and simvastatin for high cholesterol.  Patient also states that she has a history of GERD and takes Prilosec.  Patient states that she initially thought these chest pain episodes were related to heartburn but states that as time has gone on the chest pain is been worse and Prilosec has not helped.  Patient states her primary care physician is Garett Auguste and he performed blood work back in October 2021 and there was nothing abnormal at that time.  Onset: Few months ago  Location: Increased shortness of breath and left chest pain  Duration: Few months but worsening over the last few weeks and became extremely bad yesterday and this morning  Character: Worsening  Aggravating/Alleviating Factors: Unknown/rest  Radiation: Up into left shoulder blade  Severity: Severe            Review of Systems    Past Medical History:   Diagnosis Date   • Anxiety and depression    • Arthritis    • DDD (degenerative disc disease), lumbar    • Diverticulosis    • GERD (gastroesophageal reflux disease)    • Hyperlipidemia    • PONV (postoperative nausea and vomiting)        No Known Allergies    Past Surgical History:   Procedure Laterality Date   • " BLEPHAROPLASTY Bilateral 3/2/2021    Procedure: BILATERAL UPPER LID BLEPHAROPLASY;  Surgeon: Stanley Fulton MD;  [place] [facility] [place] [facility] [place]: [place]mology;  Laterality: Bilateral;   • BROW LIFT Bilateral 9/23/2021    Procedure: BILATERAL TEMPORAL DIRECT BROW LIFT, LEFT UPPER LID MULLERECTOMY;  Surgeon: Stanley Fulton MD;  [place] [facility] [place] [facility] [place] [facility] [place]  Service: Ophthalmology;  Laterality: Bilateral;   • BUNIONECTOMY Right    • COLONOSCOPY     • ENDOSCOPY     • FACIAL COSMETIC SURGERY  06/2021   • HYSTERECTOMY  1990'S   • KNEE ARTHROPLASTY Bilateral 2013 2017   • LUMBAR DISC SURGERY  1990'S   • TONSILLECTOMY         Family History   Problem Relation Age of Onset   • Heart disease Mother    • Cancer Father    • Malig Hyperthermia Neg Hx        Social History     Socioeconomic History   • Marital status:    Tobacco Use   • Smoking status: Never Smoker   • Smokeless tobacco: Never Used   Substance and Sexual Activity   • Alcohol use: Yes     Comment: SOCIAL USE   • Drug use: Never   • Sexual activity: Defer           Objective   Physical Exam  Constitutional:       General: She is not in acute distress.     Appearance: She is normal weight. She is not ill-appearing or toxic-appearing.   HENT:      Head: Normocephalic and atraumatic.      Right Ear: Tympanic membrane, ear canal and external ear normal.      Left Ear: Tympanic membrane, ear canal and external ear normal.      Nose: Nose normal. No congestion or rhinorrhea.      Mouth/Throat:      Mouth: Mucous membranes are moist.      Pharynx: Oropharynx is clear.   Eyes:      Extraocular Movements: Extraocular movements intact.      Conjunctiva/sclera: Conjunctivae normal.      Pupils: Pupils are equal, round, and reactive to light.   Cardiovascular:      Rate and Rhythm: Normal rate.      Pulses: Normal pulses.   Pulmonary:      Effort: Pulmonary effort is normal.      Breath sounds: Normal breath sounds.   Abdominal:      Palpations: Abdomen is  soft.   Musculoskeletal:         General: Normal range of motion.      Cervical back: Normal range of motion.   Skin:     General: Skin is warm and dry.      Capillary Refill: Capillary refill takes less than 2 seconds.   Neurological:      General: No focal deficit present.      Mental Status: She is alert and oriented to person, place, and time.   Psychiatric:         Mood and Affect: Mood normal.         Behavior: Behavior normal.         Thought Content: Thought content normal.         Judgment: Judgment normal.         Procedures           ED Course      EKG shows SR, rate of 67 with no ST elevation or depression or ectopy noted.  Seen and signed by Dr Jorge Chirinos at 0931, 1/5/22.    Labs Reviewed   BNP (IN-HOUSE) - Abnormal; Notable for the following components:       Result Value    proBNP 2,820.0 (*)     All other components within normal limits    Narrative:     Among patients with dyspnea, NT-proBNP is highly sensitive for the detection of acute congestive heart failure. In addition NT-proBNP of <300 pg/ml effectively rules out acute congestive heart failure with 99% negative predictive value.    Results may be falsely decreased if patient taking Biotin.     CBC WITH AUTO DIFFERENTIAL - Abnormal; Notable for the following components:    Hemoglobin 11.7 (*)     Lymphocyte % 14.0 (*)     All other components within normal limits   COVID-19,CEPHEID/VAHE,COR/BRANDI/PAD/MONA IN-HOUSE,NP SWAB IN TRANSPORT MEDIA 3-4 HR TAT, RT-PCR - Normal    Narrative:     Fact sheet for providers: https://www.fda.gov/media/358886/download     Fact sheet for patients: https://www.fda.gov/media/349452/download  Fact sheet for providers: https://www.fda.gov/media/273148/download    Fact sheet for patients: https://www.fda.gov/media/463238/download    Test performed by PCR.   TROPONIN (IN-HOUSE) - Normal    Narrative:     Troponin T Reference Range:  <= 0.03 ng/mL-   Negative for AMI  >0.03 ng/mL-     Abnormal for myocardial necrosis.   Clinicians would have to utilize clinical acumen, EKG, Troponin and serial changes to determine if it is an Acute Myocardial Infarction or myocardial injury due to an underlying chronic condition.       Results may be falsely decreased if patient taking Biotin.     COVID PRE-OP / PRE-PROCEDURE SCREENING ORDER (NO ISOLATION)    Narrative:     The following orders were created for panel order COVID PRE-OP / PRE-PROCEDURE SCREENING ORDER (NO ISOLATION) - Swab, Nasopharynx.  Procedure                               Abnormality         Status                     ---------                               -----------         ------                     COVID-19,CEPHEID/VAHE,CO...[[id number]]  Normal              Final result                 Please view results for these tests on the individual orders.   COMPREHENSIVE METABOLIC PANEL    Narrative:     GFR Normal >60  Chronic Kidney Disease <60  Kidney Failure <15     CBC AND DIFFERENTIAL    Narrative:     The following orders were created for panel order CBC & Differential.  Procedure                               Abnormality         Status                     ---------                               -----------         ------                     CBC Auto Differential[[id number]]        Abnormal            Final result                 Please view results for these tests on the individual orders.   EXTRA TUBES    Narrative:     The following orders were created for panel order Extra Tubes.  Procedure                               Abnormality         Status                     ---------                               -----------         ------                     Gold Top - SST[[id number]]                                   Final result                 Please view results for these tests on the individual orders.   GOLD TOP - SST       Medications   sodium chloride 0.9 % flush 10 mL (has no administration in time range)   sodium chloride 0.9 % flush 10 mL (has no administration in time  range)   furosemide (LASIX) injection 80 mg ( Intravenous Not Given 1/5/22 1054)   aspirin chewable tablet 324 mg (324 mg Oral Given 1/5/22 1043)   nitroglycerin (NITROSTAT) ointment 1 inch (1 inch Topical Given 1/5/22 1044)       XR Chest 1 View    Result Date: 1/5/2022   1. Heart size appears larger than on the 2016 comparison. This could be accentuated by the portable technique. 2. Otherwise, no acute chest findings.  Electronically Signed By-Radha Eddy MD On:1/5/2022 9:47 AM This report was finalized on [id number] by  Radha Eddy MD.       Vitals:    01/05/22 1001   BP: 159/74   Pulse: 64   Resp: 16   Temp:    SpO2: 92%              HEART Score: 5            Due to significant story of recent chest pain episodes with radiating chest pain, shortness of breath and diaphoresis during episodes will recommend admission for further cardiac work-up.  It is noted that patient's work-up here in the ER reveals negative troponin and increased cardiomegaly on chest x-ray.  Patient also has noted to have an moderately elevated BNP of over 2800.  Lasix ordered and administered here in the ER.  Covid test is negative for COVID-19.  CBC and CMP are virtually unremarkable, although hemoglobin is very mildly decreased at 11.7 but seems consistent with previous.  Also of note patient's sats here in ER are 92 to 95% on room air and patient appears slightly hypertensive with systolics 160-180.  Vitals:    01/05/22 1001   BP: 159/74   Pulse: 64   Resp: 16   Temp:    SpO2: 92%     325 mg of aspirin administered for acute chest pain and 1 inch Nitrostat applied for chest pain and congestive heart failure, as evidenced by cardiomegaly on chest x-ray and increased BNP.  Will admit to Dr. Garcia after report patient case presented to [facility] that is on-call for Dr. Garcia.    Medications   sodium chloride 0.9 % flush 10 mL (has no administration in time range)   sodium chloride 0.9 % flush 10 mL (has no administration in time  range)   furosemide (LASIX) injection 80 mg ( Intravenous Not Given 1/5/22 1054)   aspirin chewable tablet 324 mg (324 mg Oral Given 1/5/22 1043)   nitroglycerin (NITROSTAT) ointment 1 inch (1 inch Topical Given 1/5/22 1044)       XR Chest 1 View    Result Date: 1/5/2022   1. Heart size appears larger than on the 2016 comparison. This could be accentuated by the portable technique. 2. Otherwise, no acute chest findings.  Electronically Signed By-Radha Eddy MD On:1/5/2022 9:47 AM This report was finalized on [id number] by  Radha Eddy MD.                    MDM    Final diagnoses:   Stable angina pectoris (HCC)   Acute systolic congestive heart failure (HCC)       ED Disposition  ED Disposition     ED Disposition Condition Comment    Decision to Admit  Level of Care: Telemetry [5]   Admitting Physician: BERNARDO OLIVERA [[id number]]   Attending Physician: BERNARDO OLIVERA [[id number]]            No follow-up provider specified.       Medication List      No changes were made to your prescriptions during this visit.          Kelli Weaver, APRN  01/05/22 1058

## 2022-01-05 NOTE — PLAN OF CARE
Pt was admitted today from the ER. Pt was complaining of chest pressure as soon as she arrived on the floor, but stated that it gets better & then will come back. Cardiology consult was ordered by NP. Will continue to monitor.

## 2022-01-05 NOTE — ED NOTES
Reports last night she was laying in bed and it felt like she was falling while lying down. L side chest pain radiating to L shoulder off and on x 2 months, becoming more frequent with SOA.     Kathryn Dent RN  01/05/22 7995

## 2022-01-05 NOTE — CONSULTS
Referring Provider: Olivia Garcia MD  Reason for Consultation:  Chest discomfort  Shortness of breath  Abnormal echocardiogram with significant right atrium right ventricle enlargement and pulmonary hypertension    Patient Care Team:  Garett Barrientos MD as PCP - General    Chief complaint  Chest discomfort and shortness of breath    Subjective .     History of present illness:  Natalie Yin is a 71 y.o. female who presents with history of medical problems as documented in the assessment is admitted to the hospital with intermittent chest discomfort for last couple months which has gotten worse in the last 2 weeks associated with shortness of breath.  Shortness of breath has gotten worse significantly.  Denies having any nausea vomiting fever cough chills.  She does not smoke or drink.  No other associated aggravating or elevating factors.  Patient had EGD in the past for dysphagia.  Patient woke up this morning with chest tightness with radiating down the left arm.  It was associated with shortness of breath.  Patient came to the emergency room.  EKG showed no acute changes.  Troponin levels are negative.  Chest x-ray showed cardiomegaly.  Echocardiogram was significantly abnormal.  Patient received IV Lasix in the emergency room.  Cardiology consultation was requested..              ROS      Patient is not having any palpitations, dizziness or syncope.  Denies having any headache ,abdominal pain ,nausea, vomiting , diarrhea constipation, loss of weight or loss of appetite.  Denies having any excessive bruising ,hematuria or blood in the stool.    Review of all systems negative except as indicated      History  Past Medical History:   Diagnosis Date   • Anxiety and depression    • Arthritis    • DDD (degenerative disc disease), lumbar    • Diverticulosis    • GERD (gastroesophageal reflux disease)    • Hyperlipidemia    • PONV (postoperative nausea and vomiting)        Past Surgical History:   Procedure Laterality  Date   • BLEPHAROPLASTY Bilateral 3/2/2021    Procedure: BILATERAL UPPER LID BLEPHAROPLASY;  Surgeon: Stanley Fulton MD;  [place] [facility] [place] [facility] [place]  Service: Ophthalmology;  Laterality: Bilateral;   • BROW LIFT Bilateral 9/23/2021    Procedure: BILATERAL TEMPORAL DIRECT BROW LIFT, LEFT UPPER LID MULLERECTOMY;  Surgeon: Stanley Fulton MD;  [place] [facility] [place] [facility] [place]  Service: Ophthalmology;  Laterality: Bilateral;   • BUNIONECTOMY Right    • COLONOSCOPY     • ENDOSCOPY     • FACIAL COSMETIC SURGERY  06/2021   • HYSTERECTOMY  1990'S   • KNEE ARTHROPLASTY Bilateral 2013 2017   • LUMBAR DISC SURGERY  1990'S   • TONSILLECTOMY         Family History   Problem Relation Age of Onset   • Heart disease Mother    • Cancer Father    • Malig Hyperthermia Neg Hx        Social History     Tobacco Use   • Smoking status: Never Smoker   • Smokeless tobacco: Never Used   Substance Use Topics   • Alcohol use: Yes     Comment: SOCIAL USE   • Drug use: Never        Medications Prior to Admission   Medication Sig Dispense Refill Last Dose   • omeprazole (priLOSEC) 20 MG capsule Take 20 mg by mouth Daily.   1/4/2022 at Unknown time   • PARoxetine (PAXIL) 10 MG tablet Take 20 mg by mouth Every Night.   1/4/2022 at Unknown time   • simvastatin (ZOCOR) 10 MG tablet Take 20 mg by mouth Every Night.  5 1/4/2022 at Unknown time   • zolpidem (AMBIEN) 10 MG tablet Take 10 mg by mouth At Night As Needed.  3 1/4/2022 at Unknown time   • erythromycin (ROMYCIN) 5 MG/GM ophthalmic ointment Administer  to both eyes 2 (two) times a day for 7 days (Patient not taking: Reported on 1/5/2022) 3.5 g 1 Not Taking at Unknown time   • ondansetron (Zofran) 4 MG tablet Take 1 tablet by mouth Every 8 (Eight) Hours As Needed for Nausea or Vomiting. (Patient not taking: Reported on 1/5/2022) 10 tablet 0 Not Taking at Unknown time         Patient has no known allergies.    Scheduled Meds:atorvastatin, 10 mg, Oral, Daily  enoxaparin, 40 mg, Subcutaneous,  "Q24H  lisinopril, 5 mg, Oral, Q24H  [START ON 1/6/2022] pantoprazole, 40 mg, Oral, QAM  PARoxetine, 20 mg, Oral, Nightly  sodium chloride, 3 mL, Intravenous, Q12H      Continuous Infusions:   PRN Meds:.•  acetaminophen  •  hydrALAZINE  •  melatonin  •  nitroglycerin  •  ondansetron **OR** ondansetron  •  [COMPLETED] Insert peripheral IV **AND** sodium chloride  •  [COMPLETED] Insert peripheral IV **AND** sodium chloride  •  sodium chloride  •  zolpidem    Objective     VITAL SIGNS  Vitals:    01/05/22 1132 01/05/22 1223 01/05/22 1511 01/05/22 1514   BP: 155/77 144/72 144/72 162/80   BP Location: Right arm Right arm     Patient Position: Lying Lying     Pulse: 70 70  80   Resp: 20 16     Temp:  97.8 °F (36.6 °C)     TempSrc:  Oral     SpO2: 95% 96%     Weight:   68.5 kg (151 lb)    Height:   165.1 cm (65\")        Flowsheet Rows      First Filed Value   Admission Height 165.1 cm (65\") Documented at 01/05/2022 0825   Admission Weight 68.9 kg (151 lb 14.4 oz) Documented at 01/05/2022 0825            Intake/Output Summary (Last 24 hours) at 1/5/2022 1726  Last data filed at 1/5/2022 1131  Gross per 24 hour   Intake --   Output 800 ml   Net -800 ml        TELEMETRY: Sinus rhythm    Physical Exam:  The patient is alert, oriented and in no distress.  Vital signs as noted above.  Head and neck revealed no carotid bruits or jugular venous distention.  No thyromegaly or lymph adenopathy is present  Lungs clear.  No wheezing.  Breath sounds are normal bilaterally.  Heart normal first and second heart sounds.grade 2/6 systolic murmur.  No precordial rub is present.  No gallop is present.  Abdomen soft and nontender.  No organomegaly is present.  Extremities with good peripheral pulses without any pedal edema.  Skin warm and dry.  Musculoskeletal system is grossly normal  CNS grossly normal      Results Review:   I reviewed the patient's new clinical results.  Lab Results (last 24 hours)     Procedure Component Value Units " Date/Time    COVID PRE-OP / PRE-PROCEDURE SCREENING ORDER (NO ISOLATION) - Swab, Nasopharynx [[id number]]  (Normal) Collected: 01/05/22 1006    Specimen: Swab from Nasopharynx Updated: 01/05/22 1035    Narrative:      The following orders were created for panel order COVID PRE-OP / PRE-PROCEDURE SCREENING ORDER (NO ISOLATION) - Swab, Nasopharynx.  Procedure                               Abnormality         Status                     ---------                               -----------         ------                     COVID-19,CEPHEID/VAHE,CO...[[id number]]  Normal              Final result                 Please view results for these tests on the individual orders.    COVID-19,CEPHEID/VAHE,COR/BRANDI/PAD/[facility]A IN-HOUSE(OR EMERGENT/ADD-ON),NP SWAB IN TRANSPORT MEDIA 3-4 HR TAT, RT-PCR - Swab, Nasopharynx [[id number]]  (Normal) Collected: 01/05/22 1006    Specimen: Swab from Nasopharynx Updated: 01/05/22 1035     COVID19 Not Detected    Narrative:      Fact sheet for providers: https://www.fda.gov/media/597905/download     Fact sheet for patients: https://www.fda.gov/media/937223/download  Fact sheet for providers: https://www.fda.gov/media/589245/download    Fact sheet for patients: https://www.fda.gov/media/686752/download    Test performed by PCR.    Comprehensive Metabolic Panel [[id number]] Collected: 01/05/22 0915    Specimen: Blood from Arm, Left Updated: 01/05/22 0953     Glucose 95 mg/dL      BUN 12 mg/dL      Creatinine 0.64 mg/dL      Sodium 141 mmol/L      Potassium 3.5 mmol/L      Chloride 105 mmol/L      CO2 23.0 mmol/L      Calcium 9.4 mg/dL      Total Protein 6.8 g/dL      Albumin 3.70 g/dL      ALT (SGPT) 31 U/L      AST (SGOT) 20 U/L      Alkaline Phosphatase 99 U/L      Total Bilirubin 0.2 mg/dL      eGFR Non African Amer 92 mL/min/1.73      Globulin 3.1 gm/dL      A/G Ratio 1.2 g/dL      BUN/Creatinine Ratio 18.8     Anion Gap 13.0 mmol/L     Narrative:      GFR Normal >60  Chronic Kidney Disease  <60  Kidney Failure <15      Troponin [[id number]]  (Normal) Collected: 01/05/22 0915    Specimen: Blood from Arm, Left Updated: 01/05/22 0953     Troponin T <0.010 ng/mL     Narrative:      Troponin T Reference Range:  <= 0.03 ng/mL-   Negative for AMI  >0.03 ng/mL-     Abnormal for myocardial necrosis.  Clinicians would have to utilize clinical acumen, EKG, Troponin and serial changes to determine if it is an Acute Myocardial Infarction or myocardial injury due to an underlying chronic condition.       Results may be falsely decreased if patient taking Biotin.      BNP [[id number]]  (Abnormal) Collected: 01/05/22 0915    Specimen: Blood from Arm, Left Updated: 01/05/22 0951     proBNP 2,820.0 pg/mL     Narrative:      Among patients with dyspnea, NT-proBNP is highly sensitive for the detection of acute congestive heart failure. In addition NT-proBNP of <300 pg/ml effectively rules out acute congestive heart failure with 99% negative predictive value.    Results may be falsely decreased if patient taking Biotin.      Extra Tubes [[id number]] Collected: 01/05/22 0915    Specimen: Blood from Arm, Left Updated: 01/05/22 0937    Narrative:      The following orders were created for panel order Extra Tubes.  Procedure                               Abnormality         Status                     ---------                               -----------         ------                     Gold Top - SST[[id number]]                                   Final result                 Please view results for these tests on the individual orders.    Gold Top - SST [[id number]] Collected: 01/05/22 0915    Specimen: Blood from Arm, Left Updated: 01/05/22 0937    CBC & Differential [[id number]]  (Abnormal) Collected: 01/05/22 0915    Specimen: Blood from Arm, Left Updated: 01/05/22 0924    Narrative:      The following orders were created for panel order CBC & Differential.  Procedure                               Abnormality         Status                      ---------                               -----------         ------                     CBC Auto Differential[[id number]]        Abnormal            Final result                 Please view results for these tests on the individual orders.    CBC Auto Differential [[id number]]  (Abnormal) Collected: 01/05/22 0915    Specimen: Blood from Arm, Left Updated: 01/05/22 0924     WBC 6.80 10*3/mm3      RBC 3.81 10*6/mm3      Hemoglobin 11.7 g/dL      Hematocrit 34.6 %      MCV 90.8 fL      MCH 30.8 pg      MCHC 33.9 g/dL      RDW 14.7 %      RDW-SD 47.3 fl      MPV 8.0 fL      Platelets 301 10*3/mm3      Neutrophil % 73.4 %      Lymphocyte % 14.0 %      Monocyte % 8.3 %      Eosinophil % 3.6 %      Basophil % 0.7 %      Neutrophils, Absolute 5.00 10*3/mm3      Lymphocytes, Absolute 1.00 10*3/mm3      Monocytes, Absolute 0.60 10*3/mm3      Eosinophils, Absolute 0.20 10*3/mm3      Basophils, Absolute 0.00 10*3/mm3      nRBC 0.0 /100 WBC           Imaging Results (Last 24 Hours)     Procedure Component Value Units Date/Time    XR Chest 1 View [[id number]] Collected: 01/05/22 0944     Updated: 01/05/22 0949    Narrative:      DATE OF EXAM:  1/5/2022 9:20 AM     PROCEDURE:  XR CHEST 1 VW-     INDICATIONS:  chest pain; I20.8-Other forms of angina pectoris       COMPARISON:  PA and lateral chest radiograph 12/26/2016     TECHNIQUE:   Single radiographic view of the chest was obtained.     FINDINGS:  Heart size appears mildly enlarged. This may be accentuated by the  portable technique. Pulmonary vascular distribution is normal. The lungs  are clear. There are no acute osseous abnormalities. There is no pleural  effusion or pneumothorax. A calcified granuloma projects of the right  upper quadrant the abdomen.       Impression:         1. Heart size appears larger than on the 2016 comparison. This could be  accentuated by the portable technique.  2. Otherwise, no acute chest findings.     Electronically Signed ByLashawn  MD Surjit On:1/5/2022 9:47 AM  This report was finalized on [id number] by  Radha Eddy MD.      LAB RESULTS (LAST 7 DAYS)    CBC  Results from last 7 days   Lab Units 01/05/22  0915   WBC 10*3/mm3 6.80   RBC 10*6/mm3 3.81   HEMOGLOBIN g/dL 11.7*   HEMATOCRIT % 34.6   MCV fL 90.8   PLATELETS 10*3/mm3 301       BMP  Results from last 7 days   Lab Units 01/05/22  0915   SODIUM mmol/L 141   POTASSIUM mmol/L 3.5   CHLORIDE mmol/L 105   CO2 mmol/L 23.0   BUN mg/dL 12   CREATININE mg/dL 0.64   GLUCOSE mg/dL 95       CMP   Results from last 7 days   Lab Units 01/05/22  0915   SODIUM mmol/L 141   POTASSIUM mmol/L 3.5   CHLORIDE mmol/L 105   CO2 mmol/L 23.0   BUN mg/dL 12   CREATININE mg/dL 0.64   GLUCOSE mg/dL 95   ALBUMIN g/dL 3.70   BILIRUBIN mg/dL 0.2   ALK PHOS U/L 99   AST (SGOT) U/L 20   ALT (SGPT) U/L 31         BNP        TROPONIN  Results from last 7 days   Lab Units 01/05/22  0915   TROPONIN T ng/mL <0.010       CoAg        Creatinine Clearance  Estimated Creatinine Clearance: 64.5 mL/min (by C-G formula based on SCr of 0.64 mg/dL).    ABG        Radiology  XR Chest 1 View    Result Date: 1/5/2022   1. Heart size appears larger than on the 2016 comparison. This could be accentuated by the portable technique. 2. Otherwise, no acute chest findings.  Electronically Signed By-Radha Eddy MD On:1/5/2022 9:47 AM This report was finalized on [id number] by  Radha Eddy MD.              EKG            I personally viewed and interpreted the patient's EKG/Telemetry data: Sinus rhythm nonspecific ST-T wave abnormality    ECHOCARDIOGRAM:    Results for orders placed during the hospital encounter of 01/05/22    Adult Transthoracic Echo Complete W/ Cont if Necessary Per Protocol    Interpretation Summary  Indication  Chest pain  Dyspnea    Technically satisfactory study.  Mitral valve is structurally normal.  Tricuspid valve is structurally normal.  Moderate to significant tricuspid  regurgitation.  Significant pulmonary hypertension with pulmonary artery pressure 60 mmHg.  Aortic valve is structurally normal.  Pulmonic valve could not be well visualized.  No evidence for mitral or aortic regurgitation is seen by Doppler study.  Left atrium is normal in size.  Significant right atrium right ventricle enlargement and prominent coronary sinus.  Left ventricular size and contractility is normal with ejection fraction of 60%.  Atrial septum is not visualized.  Aorta is normal.  No pericardial effusion or intracardiac thrombus is seen.    Impression  Moderate to significant tricuspid regurgitation.  Significant right atrium right ventricle enlargement.  Significant pulmonary hypertension with pulmonary artery pressure of 60 mmHg.  Normal left ventricular size and contractility with ejection fraction of 60%.              Cardiolite (Tc-99m Sestamibi) stress test      OTHER:     Assessment/Plan     Active Problems:    Stable angina pectoris (HCC)  ]]]]]]]]]]]]]]]]]]]]]  Impression  ============  -Chest discomfort and shortness of breath.  Troponin levels are negative.  EKG showed sinus rhythm nonspecific ST-T wave changes    Echocardiogram showed significant right atrium right ventricle enlargement and pulmonary hypertension with pulmonary artery pressure of 60 mmHg.  Atrial septum could not be visualized.    -Hypertension anxiety depression GERD dyslipidemia    -Congestive heart failure probably right-sided.  Elevated BNP.  Chest x-ray showed cardiomegaly    -Status post facial cosmetic surgery hysterectomy bilateral knee arthroplasty lumbar disc surgery tonsillectomy blepharoplasty    -Family history is positive for coronary artery disease    -No known allergies    -Non-smoker  ===============  Plan  ==============  Patient has chest discomfort and shortness of breath.  Echocardiogram is significantly abnormal with failure to the right ventricle enlargement and pulmonary hypertension.  Probable  right-sided heart failure.  Diuresis.  Close observation of renal function.  Patient would benefit from transesophageal echocardiogram to assess atrial septum for atrial septal defect and to exclude secondary causes of normal hypertension.  Patient may have primary pulmonary hypertension.  Patient will have transesophageal echocardiogram and cardiac catheterization on Friday after patient receives diuresis.  Risks and benefits pros and cons of the MARKIE and right and left heart catheterization were discussed with patient including infection bleeding blood clot heart attack stroke allergic reaction to the dye renal dysfunction etc.  Follow-up labs ordered.  Further plan will depend on patient's progress.  ]]]]]]]]]]]]]]]]]]]]]    Chase Paige MD  01/05/22  17:26 EST

## 2022-01-06 PROBLEM — I51.7 RIGHT VENTRICULAR DILATION: Status: ACTIVE | Noted: 2022-01-01

## 2022-01-06 PROBLEM — I50.21 ACUTE SYSTOLIC CONGESTIVE HEART FAILURE (HCC): Status: ACTIVE | Noted: 2022-01-01

## 2022-01-06 PROBLEM — I27.20 PULMONARY HYPERTENSION (HCC): Status: ACTIVE | Noted: 2022-01-01

## 2022-01-06 NOTE — PROGRESS NOTES
Referring Provider: Olivia Garcia MD    Reason for follow-up:  Asked discomfort  Shortness of breath  Significant right atrial and right ventricle enlargement  Pulmonary hypertension     Patient Care Team:  Garett Barrientos MD as PCP - General    Subjective .      ROS    Since I have last seen, the patient has been without any chest discomfort ,shortness of breath, palpitations, dizziness or syncope.  Denies having any headache ,abdominal pain ,nausea, vomiting , diarrhea constipation, loss of weight or loss of appetite.  Denies having any excessive bruising ,hematuria or blood in the stool.    Review of all systems negative except as indicated    History  Past Medical History:   Diagnosis Date   • Anxiety and depression    • Arthritis    • DDD (degenerative disc disease), lumbar    • Diverticulosis    • GERD (gastroesophageal reflux disease)    • Hyperlipidemia    • PONV (postoperative nausea and vomiting)        Past Surgical History:   Procedure Laterality Date   • BLEPHAROPLASTY Bilateral 3/2/2021    Procedure: BILATERAL UPPER LID BLEPHAROPLASY;  Surgeon: Stanley Fulton MD;  [place] [facility] [place] [facility] [place]  Service: Ophthalmology;  Laterality: Bilateral;   • BROW LIFT Bilateral 9/23/2021    Procedure: BILATERAL TEMPORAL DIRECT BROW LIFT, LEFT UPPER LID MULLERECTOMY;  Surgeon: Stanley Fulton MD;  Location: [facility];  Service: Ophthalmology;  Laterality: Bilateral;   • BUNIONECTOMY Right    • COLONOSCOPY     • ENDOSCOPY     • FACIAL COSMETIC SURGERY  06/2021   • HYSTERECTOMY  1990'S   • KNEE ARTHROPLASTY Bilateral 2013 2017   • LUMBAR DISC SURGERY  1990'S   • TONSILLECTOMY         Family History   Problem Relation Age of Onset   • Heart disease Mother    • Cancer Father    • Malig Hyperthermia Neg Hx        Social History     Tobacco Use   • Smoking status: Never Smoker   • Smokeless tobacco: Never Used   Substance Use Topics   • Alcohol use: Yes     Comment: SOCIAL USE   • Drug use: Never         Medications Prior to Admission   Medication Sig Dispense Refill Last Dose   • omeprazole (priLOSEC) 20 MG capsule Take 20 mg by mouth Daily.   1/4/2022 at Unknown time   • PARoxetine (PAXIL) 10 MG tablet Take 20 mg by mouth Every Night.   1/4/2022 at Unknown time   • simvastatin (ZOCOR) 10 MG tablet Take 20 mg by mouth Every Night.  5 1/4/2022 at Unknown time   • zolpidem (AMBIEN) 10 MG tablet Take 10 mg by mouth At Night As Needed.  3 1/4/2022 at Unknown time   • erythromycin (ROMYCIN) 5 MG/GM ophthalmic ointment Administer  to both eyes 2 (two) times a day for 7 days (Patient not taking: Reported on 1/5/2022) 3.5 g 1 Not Taking at Unknown time   • ondansetron (Zofran) 4 MG tablet Take 1 tablet by mouth Every 8 (Eight) Hours As Needed for Nausea or Vomiting. (Patient not taking: Reported on 1/5/2022) 10 tablet 0 Not Taking at Unknown time       Allergies  Patient has no known allergies.    Scheduled Meds:atorvastatin, 10 mg, Oral, Daily  enoxaparin, 40 mg, Subcutaneous, Q24H  furosemide, 40 mg, Oral, Daily  lisinopril, 5 mg, Oral, Q24H  pantoprazole, 40 mg, Oral, QAM  PARoxetine, 20 mg, Oral, Nightly  sodium chloride, 3 mL, Intravenous, Q12H      Continuous Infusions:   PRN Meds:.•  acetaminophen  •  hydrALAZINE  •  melatonin  •  nitroglycerin  •  ondansetron **OR** ondansetron  •  [COMPLETED] Insert peripheral IV **AND** sodium chloride  •  [COMPLETED] Insert peripheral IV **AND** sodium chloride  •  sodium chloride  •  zolpidem    Objective     VITAL SIGNS  Vitals:    01/05/22 1832 01/05/22 2008 01/05/22 2330 01/06/22 0407   BP: 131/72 120/70 110/68 119/72   BP Location:  Right arm Right arm Right arm   Patient Position:  Lying Lying Sitting   Pulse: 79 71 68 71   Resp:  15 15 16   Temp:  97.8 °F (36.6 °C) 97.6 °F (36.4 °C) 97.9 °F (36.6 °C)   TempSrc:  Oral Oral Oral   SpO2:  93% 95% 92%   Weight:    64.7 kg (142 lb 9.6 oz)   Height:           Flowsheet Rows      First Filed Value   Admission Height 165.1 cm  "(65\") Documented at 01/05/2022 0825   Admission Weight 68.9 kg (151 lb 14.4 oz) Documented at 01/05/2022 0825            Intake/Output Summary (Last 24 hours) at 1/6/2022 0628  Last data filed at 1/5/2022 2008  Gross per 24 hour   Intake 240 ml   Output 800 ml   Net -560 ml        TELEMETRY: Sinus rhythm    Physical Exam:  The patient is alert, oriented and in no distress.  Vital signs as noted above.  Head and neck revealed no carotid bruits or jugular venous distention.  No thyromegaly or lymphadenopathy is present  Lungs clear.  No wheezing.  Breath sounds are normal bilaterally.  Heart normal first and second heart sounds.  No murmur. No precordial rub is present.  No gallop is present.  Abdomen soft and nontender.  No organomegaly is present.  Extremities with good peripheral pulses without any pedal edema.  Skin warm and dry.  Musculoskeletal system is grossly normal  CNS grossly normal      Results Review:   I reviewed the patient's new clinical results.  Lab Results (last 24 hours)     Procedure Component Value Units Date/Time    TSH [[id number]]  (Normal) Collected: 01/06/22 0405    Specimen: Blood Updated: 01/06/22 0537     TSH 2.810 uIU/mL     Comprehensive Metabolic Panel [[id number]] Collected: 01/06/22 0405    Specimen: Blood Updated: 01/06/22 0521     Glucose 99 mg/dL      BUN 11 mg/dL      Creatinine 0.69 mg/dL      Sodium 142 mmol/L      Potassium 3.6 mmol/L      Chloride 102 mmol/L      CO2 26.0 mmol/L      Calcium 9.2 mg/dL      Total Protein 7.1 g/dL      Albumin 3.90 g/dL      ALT (SGPT) 26 U/L      AST (SGOT) 17 U/L      Alkaline Phosphatase 102 U/L      Total Bilirubin 0.4 mg/dL      eGFR Non African Amer 84 mL/min/1.73      Globulin 3.2 gm/dL      A/G Ratio 1.2 g/dL      BUN/Creatinine Ratio 15.9     Anion Gap 14.0 mmol/L     Narrative:      GFR Normal >60  Chronic Kidney Disease <60  Kidney Failure <15      Magnesium [[id number]]  (Normal) Collected: 01/06/22 0405    Specimen: Blood Updated: " 01/06/22 0521     Magnesium 2.0 mg/dL     Protime-INR [[id number]]  (Normal) Collected: 01/06/22 0405    Specimen: Blood Updated: 01/06/22 0518     Protime 10.9 Seconds      INR 0.98    aPTT [[id number]]  (Normal) Collected: 01/06/22 0405    Specimen: Blood Updated: 01/06/22 0518     PTT 26.5 seconds     CBC Auto Differential [[id number]]  (Normal) Collected: 01/06/22 0405    Specimen: Blood Updated: 01/06/22 0501     WBC 6.10 10*3/mm3      RBC 4.06 10*6/mm3      Hemoglobin 12.2 g/dL      Hematocrit 36.8 %      MCV 90.6 fL      MCH 30.1 pg      MCHC 33.3 g/dL      RDW 14.7 %      RDW-SD 46.4 fl      MPV 8.3 fL      Platelets 312 10*3/mm3      Neutrophil % 61.3 %      Lymphocyte % 20.3 %      Monocyte % 11.4 %      Eosinophil % 6.1 %      Basophil % 0.9 %      Neutrophils, Absolute 3.70 10*3/mm3      Lymphocytes, Absolute 1.20 10*3/mm3      Monocytes, Absolute 0.70 10*3/mm3      Eosinophils, Absolute 0.40 10*3/mm3      Basophils, Absolute 0.10 10*3/mm3      nRBC 0.1 /100 WBC     COVID PRE-OP / PRE-PROCEDURE SCREENING ORDER (NO ISOLATION) - Swab, Nasopharynx [[id number]]  (Normal) Collected: 01/05/22 1006    Specimen: Swab from Nasopharynx Updated: 01/05/22 1035    Narrative:      The following orders were created for panel order COVID PRE-OP / PRE-PROCEDURE SCREENING ORDER (NO ISOLATION) - Swab, Nasopharynx.  Procedure                               Abnormality         Status                     ---------                               -----------         ------                     COVID-19,CEPHEID/VAHE,CO...[[id number]]  Normal              Final result                 Please view results for these tests on the individual orders.    COVID-19,CEPHEID/VAHE,COR/BRANDI/PAD/MONA IN-HOUSE(OR EMERGENT/ADD-ON),NP SWAB IN TRANSPORT MEDIA 3-4 HR TAT, RT-PCR - Swab, Nasopharynx [[id number]]  (Normal) Collected: 01/05/22 1006    Specimen: Swab from Nasopharynx Updated: 01/05/22 1035     COVID19 Not Detected    Narrative:      Fact sheet  for providers: https://www.fda.gov/media/541059/download     Fact sheet for patients: https://www.fda.gov/media/685687/download  Fact sheet for providers: https://www.fda.gov/media/991472/download    Fact sheet for patients: https://www.fda.gov/media/158383/download    Test performed by PCR.    Comprehensive Metabolic Panel [[id number]] Collected: 01/05/22 0915    Specimen: Blood from Arm, Left Updated: 01/05/22 0953     Glucose 95 mg/dL      BUN 12 mg/dL      Creatinine 0.64 mg/dL      Sodium 141 mmol/L      Potassium 3.5 mmol/L      Chloride 105 mmol/L      CO2 23.0 mmol/L      Calcium 9.4 mg/dL      Total Protein 6.8 g/dL      Albumin 3.70 g/dL      ALT (SGPT) 31 U/L      AST (SGOT) 20 U/L      Alkaline Phosphatase 99 U/L      Total Bilirubin 0.2 mg/dL      eGFR Non African Amer 92 mL/min/1.73      Globulin 3.1 gm/dL      A/G Ratio 1.2 g/dL      BUN/Creatinine Ratio 18.8     Anion Gap 13.0 mmol/L     Narrative:      GFR Normal >60  Chronic Kidney Disease <60  Kidney Failure <15      Troponin [[id number]]  (Normal) Collected: 01/05/22 0915    Specimen: Blood from Arm, Left Updated: 01/05/22 0953     Troponin T <0.010 ng/mL     Narrative:      Troponin T Reference Range:  <= 0.03 ng/mL-   Negative for AMI  >0.03 ng/mL-     Abnormal for myocardial necrosis.  Clinicians would have to utilize clinical acumen, EKG, Troponin and serial changes to determine if it is an Acute Myocardial Infarction or myocardial injury due to an underlying chronic condition.       Results may be falsely decreased if patient taking Biotin.      BNP [[id number]]  (Abnormal) Collected: 01/05/22 0915    Specimen: Blood from Arm, Left Updated: 01/05/22 0951     proBNP 2,820.0 pg/mL     Narrative:      Among patients with dyspnea, NT-proBNP is highly sensitive for the detection of acute congestive heart failure. In addition NT-proBNP of <300 pg/ml effectively rules out acute congestive heart failure with 99% negative predictive value.    Results  may be falsely decreased if patient taking Biotin.      Extra Tubes [[id number]] Collected: 01/05/22 0915    Specimen: Blood from Arm, Left Updated: 01/05/22 0937    Narrative:      The following orders were created for panel order Extra Tubes.  Procedure                               Abnormality         Status                     ---------                               -----------         ------                     Gold Top - SST[[id number]]                                   Final result                 Please view results for these tests on the individual orders.    Gold Top - SST [[id number]] Collected: 01/05/22 0915    Specimen: Blood from Arm, Left Updated: 01/05/22 0937    CBC & Differential [[id number]]  (Abnormal) Collected: 01/05/22 0915    Specimen: Blood from Arm, Left Updated: 01/05/22 0924    Narrative:      The following orders were created for panel order CBC & Differential.  Procedure                               Abnormality         Status                     ---------                               -----------         ------                     CBC Auto Differential[[id number]]        Abnormal            Final result                 Please view results for these tests on the individual orders.    CBC Auto Differential [[id number]]  (Abnormal) Collected: 01/05/22 0915    Specimen: Blood from Arm, Left Updated: 01/05/22 0924     WBC 6.80 10*3/mm3      RBC 3.81 10*6/mm3      Hemoglobin 11.7 g/dL      Hematocrit 34.6 %      MCV 90.8 fL      MCH 30.8 pg      MCHC 33.9 g/dL      RDW 14.7 %      RDW-SD 47.3 fl      MPV 8.0 fL      Platelets 301 10*3/mm3      Neutrophil % 73.4 %      Lymphocyte % 14.0 %      Monocyte % 8.3 %      Eosinophil % 3.6 %      Basophil % 0.7 %      Neutrophils, Absolute 5.00 10*3/mm3      Lymphocytes, Absolute 1.00 10*3/mm3      Monocytes, Absolute 0.60 10*3/mm3      Eosinophils, Absolute 0.20 10*3/mm3      Basophils, Absolute 0.00 10*3/mm3      nRBC 0.0 /100 WBC           Imaging  Results (Last 24 Hours)     Procedure Component Value Units Date/Time    XR Chest 1 View [[id number]] Collected: 01/05/22 0944     Updated: 01/05/22 0949    Narrative:      DATE OF EXAM:  1/5/2022 9:20 AM     PROCEDURE:  XR CHEST 1 VW-     INDICATIONS:  chest pain; I20.8-Other forms of angina pectoris       COMPARISON:  PA and lateral chest radiograph 12/26/2016     TECHNIQUE:   Single radiographic view of the chest was obtained.     FINDINGS:  Heart size appears mildly enlarged. This may be accentuated by the  portable technique. Pulmonary vascular distribution is normal. The lungs  are clear. There are no acute osseous abnormalities. There is no pleural  effusion or pneumothorax. A calcified granuloma projects of the right  upper quadrant the abdomen.       Impression:         1. Heart size appears larger than on the 2016 comparison. This could be  accentuated by the portable technique.  2. Otherwise, no acute chest findings.     Electronically Signed By-Radha Eddy MD On:1/5/2022 9:47 AM  This report was finalized on [id number] by  Radha Eddy MD.      LAB RESULTS (LAST 7 DAYS)    CBC  Results from last 7 days   Lab Units 01/06/22  0405 01/05/22  0915   WBC 10*3/mm3 6.10 6.80   RBC 10*6/mm3 4.06 3.81   HEMOGLOBIN g/dL 12.2 11.7*   HEMATOCRIT % 36.8 34.6   MCV fL 90.6 90.8   PLATELETS 10*3/mm3 312 301       BMP  Results from last 7 days   Lab Units 01/06/22  0405 01/05/22  0915   SODIUM mmol/L 142 141   POTASSIUM mmol/L 3.6 3.5   CHLORIDE mmol/L 102 105   CO2 mmol/L 26.0 23.0   BUN mg/dL 11 12   CREATININE mg/dL 0.69 0.64   GLUCOSE mg/dL 99 95   MAGNESIUM mg/dL 2.0  --        CMP   Results from last 7 days   Lab Units 01/06/22  0405 01/05/22  0915   SODIUM mmol/L 142 141   POTASSIUM mmol/L 3.6 3.5   CHLORIDE mmol/L 102 105   CO2 mmol/L 26.0 23.0   BUN mg/dL 11 12   CREATININE mg/dL 0.69 0.64   GLUCOSE mg/dL 99 95   ALBUMIN g/dL 3.90 3.70   BILIRUBIN mg/dL 0.4 0.2   ALK PHOS U/L 102 99   AST (SGOT) U/L 17  20   ALT (SGPT) U/L 26 31         BNP        TROPONIN  Results from last 7 days   Lab Units 01/05/22  0915   TROPONIN T ng/mL <0.010       CoAg  Results from last 7 days   Lab Units 01/06/22  0405   INR  0.98   APTT seconds 26.5       Creatinine Clearance  Estimated Creatinine Clearance: 67.8 mL/min (by C-G formula based on SCr of 0.69 mg/dL).    ABG        Radiology  XR Chest 1 View    Result Date: 1/5/2022   1. Heart size appears larger than on the 2016 comparison. This could be accentuated by the portable technique. 2. Otherwise, no acute chest findings.  Electronically Signed By-Radha Eddy MD On:1/5/2022 9:47 AM This report was finalized on [id number] by  Radha Eddy MD.              EKG                  I personally viewed and interpreted the patient's EKG/Telemetry data: Normal sinus rhythm nonspecific ST-T wave changes    ECHOCARDIOGRAM:    Results for orders placed during the hospital encounter of 01/05/22    Adult Transthoracic Echo Complete W/ Cont if Necessary Per Protocol    Interpretation Summary  Indication  Chest pain  Dyspnea    Technically satisfactory study.  Mitral valve is structurally normal.  Tricuspid valve is structurally normal.  Moderate to significant tricuspid regurgitation.  Significant pulmonary hypertension with pulmonary artery pressure 60 mmHg.  Aortic valve is structurally normal.  Pulmonic valve could not be well visualized.  No evidence for mitral or aortic regurgitation is seen by Doppler study.  Left atrium is normal in size.  Significant right atrium right ventricle enlargement and prominent coronary sinus.  Left ventricular size and contractility is normal with ejection fraction of 60%.  Atrial septum is not visualized.  Aorta is normal.  No pericardial effusion or intracardiac thrombus is seen.    Impression  Moderate to significant tricuspid regurgitation.  Significant right atrium right ventricle enlargement.  Significant pulmonary hypertension with pulmonary  artery pressure of 60 mmHg.  Normal left ventricular size and contractility with ejection fraction of 60%.          STRESS MYOVIEW:    Cardiolite (Tc-99m Sestamibi) stress test    CARDIAC CATHETERIZATION:            OTHER:         Assessment/Plan     Active Problems:    Stable angina pectoris (HCC)      ]]]]]]]]]]]]]]]]]]]]]  Impression  ============  -Chest discomfort and shortness of breath.  Troponin levels are negative.  EKG showed sinus rhythm nonspecific ST-T wave changes     Echocardiogram showed significant right atrium right ventricle enlargement and pulmonary hypertension with pulmonary artery pressure of 60 mmHg.  Atrial septum could not be visualized.     -Hypertension anxiety depression GERD dyslipidemia     -Congestive heart failure probably right-sided.  Elevated BNP.  Chest x-ray showed cardiomegaly     -Status post facial cosmetic surgery hysterectomy bilateral knee arthroplasty lumbar disc surgery tonsillectomy blepharoplasty     -Family history is positive for coronary artery disease     -No known allergies     -Non-smoker  ===============  Plan  ==============  Patient has chest discomfort and shortness of breath.  Echocardiogram is significantly abnormal with failure to the right ventricle enlargement and pulmonary hypertension.  Probable right-sided heart failure.  Diuresis.  Close observation of renal function.  Patient would benefit from transesophageal echocardiogram to assess atrial septum for atrial septal defect and to exclude secondary causes of normal hypertension.  Patient may have primary pulmonary hypertension.  Patient will have transesophageal echocardiogram and cardiac catheterization on tomorrow after patient receives diuresis.  Risks and benefits pros and cons of the MARKIE and right and left heart catheterization were discussed with patient including infection bleeding blood clot heart attack stroke allergic reaction to the dye renal dysfunction etc.  Follow-up labs  ordered.  Further plan will depend on patient's progress.  ]]]]]]]]]]]]]]]]]]]]]           Chase Paige MD  01/06/22  06:28 EST

## 2022-01-06 NOTE — PLAN OF CARE
Goal Outcome Evaluation:  Plan of Care Reviewed With: patient        Progress: no change  Outcome Summary: Pt complained of feeling antsy and having intermittent chest pressure.  She stated she felt better when she would get up and walk to the bathroom.  Pt was wanting something to help her sleep.  She took ambien and was able to rest and had no further complaints.

## 2022-01-06 NOTE — PLAN OF CARE
Problem: Adult Inpatient Plan of Care  Goal: Plan of Care Review  Outcome: Ongoing, Progressing  Flowsheets (Taken 1/6/2022 1345)  Plan of Care Reviewed With: patient  Goal: Patient-Specific Goal (Individualized)  Outcome: Ongoing, Progressing  Goal: Absence of Hospital-Acquired Illness or Injury  Outcome: Ongoing, Progressing  Intervention: Identify and Manage Fall Risk  Recent Flowsheet Documentation  Taken 1/6/2022 1330 by Lisa Marcelino RN  Safety Promotion/Fall Prevention:   safety round/check completed   nonskid shoes/slippers when out of bed  Taken 1/6/2022 1106 by Lisa Marcelino RN  Safety Promotion/Fall Prevention:   safety round/check completed   nonskid shoes/slippers when out of bed  Taken 1/6/2022 0921 by Lisa Marcelino RN  Safety Promotion/Fall Prevention:   safety round/check completed   nonskid shoes/slippers when out of bed  Taken 1/6/2022 0720 by Lisa Marcelino RN  Safety Promotion/Fall Prevention:   safety round/check completed   nonskid shoes/slippers when out of bed  Intervention: Prevent Skin Injury  Recent Flowsheet Documentation  Taken 1/6/2022 0720 by Lisa Marcelino RN  Body Position: position maintained  Intervention: Prevent Infection  Recent Flowsheet Documentation  Taken 1/6/2022 1330 by Lisa Marcelino RN  Infection Prevention:   rest/sleep promoted   hand hygiene promoted   single patient room provided  Taken 1/6/2022 1106 by Lisa Marcelino RN  Infection Prevention:   single patient room provided   rest/sleep promoted   hand hygiene promoted  Taken 1/6/2022 0921 by Lisa Marcelino RN  Infection Prevention:   single patient room provided   rest/sleep promoted   hand hygiene promoted  Taken 1/6/2022 0720 by Lisa Marcelino RN  Infection Prevention:   single patient room provided   rest/sleep promoted   hand hygiene promoted  Goal: Optimal Comfort and Wellbeing  Outcome: Ongoing, Progressing  Intervention: Provide Person-Centered Care  Recent Flowsheet  Documentation  Taken 1/6/2022 0720 by Lisa Marcelino RN  Trust Relationship/Rapport:   care explained   thoughts/feelings acknowledged  Goal: Readiness for Transition of Care  Outcome: Ongoing, Progressing     Problem: Hypertension Comorbidity  Goal: Blood Pressure in Desired Range  Outcome: Ongoing, Progressing  Intervention: Maintain Hypertension-Management Strategies  Recent Flowsheet Documentation  Taken 1/6/2022 1330 by Lisa Marcelino RN  Medication Review/Management: medications reviewed  Taken 1/6/2022 1106 by Lisa Marcelino RN  Medication Review/Management: medications reviewed  Taken 1/6/2022 0921 by Lisa Marcelino RN  Medication Review/Management: medications reviewed  Taken 1/6/2022 0720 by Lisa Marcelino RN  Medication Review/Management: medications reviewed   Goal Outcome Evaluation:  Plan of Care Reviewed With: patient      Patient rested throughout the shift. MARKIE and Cardiac Cath scheduled for tomorrow; consent signed in chart, patient to be NPO @ [facility]. Will continue to observe.

## 2022-01-06 NOTE — PROGRESS NOTES
LOS: 0 days   Patient Care Team:  Garett Barrientos MD as PCP - General    Subjective     Interval History:    Patient Complaints: Less SOA, left sided chest pain resolved; brisk diuresis overnight    History taken from: patient    Review of Systems   Constitutional: Positive for activity change. Negative for appetite change, diaphoresis and fatigue.   HENT: Negative for facial swelling.    Eyes: Negative for visual disturbance.   Respiratory: Negative for cough, shortness of breath, wheezing and stridor.    Cardiovascular: Negative for chest pain, palpitations and leg swelling.   Gastrointestinal: Negative for abdominal pain, constipation, diarrhea, nausea and vomiting.   Endocrine: Negative for polyuria.   Genitourinary: Negative for dysuria and urgency.   Musculoskeletal: Negative for arthralgias.   Skin: Negative for rash and wound.   Neurological: Negative for weakness.   Psychiatric/Behavioral: Negative for confusion.           Objective     Vital Signs  Temp:  [97.6 °F (36.4 °C)-97.9 °F (36.6 °C)] 97.9 °F (36.6 °C)  Heart Rate:  [64-80] 70  Resp:  [15-21] 16  BP: (110-162)/(68-87) 122/73    Physical Exam:     General Appearance:    Alert, cooperative, in no acute distress,   Head:    Normocephalic, without obvious abnormality, atraumatic   Eyes:            Lids and lashes normal, conjunctivae and sclerae normal, no   icterus, no pallor, corneas clear, PERRLA   Ears:    Ears appear intact with no abnormalities noted   Throat:   No oral lesions, no thrush, oral mucosa moist   Neck:   No adenopathy, supple, trachea midline, no thyromegaly, no   carotid bruit, no JVD   Lungs:     Clear to auscultation,respirations regular, even and                  unlabored    Heart:    Regular rhythm and normal rate, normal S1 and S2, no            murmur, no gallop, no rub, no click   Chest Wall:    No abnormalities observed   Abdomen:     Normal bowel sounds, no masses, no organomegaly, soft        Non-tender non-distended,  no guarding,   Extremities:   Moves all extremities well, no edema, no cyanosis, no             Redness   Pulses:   Pulses palpable and equal bilaterally   Skin:   No bleeding, bruising or rash   Lymph nodes:   No palpable adenopathy   Neurologic:   Cranial nerves 2 - 12 grossly intact, sensation intact, DTR       present and equal bilaterally        Results Review:    Lab Results (last 24 hours)     Procedure Component Value Units Date/Time    TSH [[id number]]  (Normal) Collected: 01/06/22 0405    Specimen: Blood Updated: 01/06/22 0537     TSH 2.810 uIU/mL     Comprehensive Metabolic Panel [[id number]] Collected: 01/06/22 0405    Specimen: Blood Updated: 01/06/22 0521     Glucose 99 mg/dL      BUN 11 mg/dL      Creatinine 0.69 mg/dL      Sodium 142 mmol/L      Potassium 3.6 mmol/L      Chloride 102 mmol/L      CO2 26.0 mmol/L      Calcium 9.2 mg/dL      Total Protein 7.1 g/dL      Albumin 3.90 g/dL      ALT (SGPT) 26 U/L      AST (SGOT) 17 U/L      Alkaline Phosphatase 102 U/L      Total Bilirubin 0.4 mg/dL      eGFR Non African Amer 84 mL/min/1.73      Globulin 3.2 gm/dL      A/G Ratio 1.2 g/dL      BUN/Creatinine Ratio 15.9     Anion Gap 14.0 mmol/L     Narrative:      GFR Normal >60  Chronic Kidney Disease <60  Kidney Failure <15      Magnesium [[id number]]  (Normal) Collected: 01/06/22 0405    Specimen: Blood Updated: 01/06/22 0521     Magnesium 2.0 mg/dL     Protime-INR [[id number]]  (Normal) Collected: 01/06/22 0405    Specimen: Blood Updated: 01/06/22 0518     Protime 10.9 Seconds      INR 0.98    aPTT [[id number]]  (Normal) Collected: 01/06/22 0405    Specimen: Blood Updated: 01/06/22 0518     PTT 26.5 seconds     CBC Auto Differential [[id number]]  (Normal) Collected: 01/06/22 0405    Specimen: Blood Updated: 01/06/22 0501     WBC 6.10 10*3/mm3      RBC 4.06 10*6/mm3      Hemoglobin 12.2 g/dL      Hematocrit 36.8 %      MCV 90.6 fL      MCH 30.1 pg      MCHC 33.3 g/dL      RDW 14.7 %      RDW-SD 46.4 fl      MPV  8.3 fL      Platelets 312 10*3/mm3      Neutrophil % 61.3 %      Lymphocyte % 20.3 %      Monocyte % 11.4 %      Eosinophil % 6.1 %      Basophil % 0.9 %      Neutrophils, Absolute 3.70 10*3/mm3      Lymphocytes, Absolute 1.20 10*3/mm3      Monocytes, Absolute 0.70 10*3/mm3      Eosinophils, Absolute 0.40 10*3/mm3      Basophils, Absolute 0.10 10*3/mm3      nRBC 0.1 /100 WBC     COVID PRE-OP / PRE-PROCEDURE SCREENING ORDER (NO ISOLATION) - Swab, Nasopharynx [[id number]]  (Normal) Collected: 01/05/22 1006    Specimen: Swab from Nasopharynx Updated: 01/05/22 1035    Narrative:      The following orders were created for panel order COVID PRE-OP / PRE-PROCEDURE SCREENING ORDER (NO ISOLATION) - Swab, Nasopharynx.  Procedure                               Abnormality         Status                     ---------                               -----------         ------                     COVID-19,CEPHEID/VAHE,CO...[[id number]]  Normal              Final result                 Please view results for these tests on the individual orders.    COVID-19,CEPHEID/VAHE,COR/BRANDI/PAD/[facility]A IN-HOUSE(OR EMERGENT/ADD-ON),NP SWAB IN TRANSPORT MEDIA 3-4 HR TAT, RT-PCR - Swab, Nasopharynx [[id number]]  (Normal) Collected: 01/05/22 1006    Specimen: Swab from Nasopharynx Updated: 01/05/22 1035     COVID19 Not Detected    Narrative:      Fact sheet for providers: https://www.fda.gov/media/648597/download     Fact sheet for patients: https://www.fda.gov/media/472352/download  Fact sheet for providers: https://www.fda.gov/media/836892/download    Fact sheet for patients: https://www.fda.gov/media/228834/download    Test performed by PCR.    Comprehensive Metabolic Panel [[id number]] Collected: 01/05/22 0915    Specimen: Blood from Arm, Left Updated: 01/05/22 0953     Glucose 95 mg/dL      BUN 12 mg/dL      Creatinine 0.64 mg/dL      Sodium 141 mmol/L      Potassium 3.5 mmol/L      Chloride 105 mmol/L      CO2 23.0 mmol/L      Calcium 9.4 mg/dL       Total Protein 6.8 g/dL      Albumin 3.70 g/dL      ALT (SGPT) 31 U/L      AST (SGOT) 20 U/L      Alkaline Phosphatase 99 U/L      Total Bilirubin 0.2 mg/dL      eGFR Non African Amer 92 mL/min/1.73      Globulin 3.1 gm/dL      A/G Ratio 1.2 g/dL      BUN/Creatinine Ratio 18.8     Anion Gap 13.0 mmol/L     Narrative:      GFR Normal >60  Chronic Kidney Disease <60  Kidney Failure <15      Troponin [[id number]]  (Normal) Collected: 01/05/22 0915    Specimen: Blood from Arm, Left Updated: 01/05/22 0953     Troponin T <0.010 ng/mL     Narrative:      Troponin T Reference Range:  <= 0.03 ng/mL-   Negative for AMI  >0.03 ng/mL-     Abnormal for myocardial necrosis.  Clinicians would have to utilize clinical acumen, EKG, Troponin and serial changes to determine if it is an Acute Myocardial Infarction or myocardial injury due to an underlying chronic condition.       Results may be falsely decreased if patient taking Biotin.      BNP [[id number]]  (Abnormal) Collected: 01/05/22 0915    Specimen: Blood from Arm, Left Updated: 01/05/22 0951     proBNP 2,820.0 pg/mL     Narrative:      Among patients with dyspnea, NT-proBNP is highly sensitive for the detection of acute congestive heart failure. In addition NT-proBNP of <300 pg/ml effectively rules out acute congestive heart failure with 99% negative predictive value.    Results may be falsely decreased if patient taking Biotin.      Extra Tubes [[id number]] Collected: 01/05/22 0915    Specimen: Blood from Arm, Left Updated: 01/05/22 0937    Narrative:      The following orders were created for panel order Extra Tubes.  Procedure                               Abnormality         Status                     ---------                               -----------         ------                     Gold Top - SST[[id number]]                                   Final result                 Please view results for these tests on the individual orders.    Gold Top - SST [[id number]] Collected:  01/05/22 0915    Specimen: Blood from Arm, Left Updated: 01/05/22 0937    CBC & Differential [[id number]]  (Abnormal) Collected: 01/05/22 0915    Specimen: Blood from Arm, Left Updated: 01/05/22 0924    Narrative:      The following orders were created for panel order CBC & Differential.  Procedure                               Abnormality         Status                     ---------                               -----------         ------                     CBC Auto Differential[[id number]]        Abnormal            Final result                 Please view results for these tests on the individual orders.    CBC Auto Differential [[id number]]  (Abnormal) Collected: 01/05/22 0915    Specimen: Blood from Arm, Left Updated: 01/05/22 0924     WBC 6.80 10*3/mm3      RBC 3.81 10*6/mm3      Hemoglobin 11.7 g/dL      Hematocrit 34.6 %      MCV 90.8 fL      MCH 30.8 pg      MCHC 33.9 g/dL      RDW 14.7 %      RDW-SD 47.3 fl      MPV 8.0 fL      Platelets 301 10*3/mm3      Neutrophil % 73.4 %      Lymphocyte % 14.0 %      Monocyte % 8.3 %      Eosinophil % 3.6 %      Basophil % 0.7 %      Neutrophils, Absolute 5.00 10*3/mm3      Lymphocytes, Absolute 1.00 10*3/mm3      Monocytes, Absolute 0.60 10*3/mm3      Eosinophils, Absolute 0.20 10*3/mm3      Basophils, Absolute 0.00 10*3/mm3      nRBC 0.0 /100 WBC            Imaging Results (Last 24 Hours)     Procedure Component Value Units Date/Time    XR Chest 1 View [[id number]] Collected: 01/05/22 0944     Updated: 01/05/22 0949    Narrative:      DATE OF EXAM:  1/5/2022 9:20 AM     PROCEDURE:  XR CHEST 1 VW-     INDICATIONS:  chest pain; I20.8-Other forms of angina pectoris       COMPARISON:  PA and lateral chest radiograph 12/26/2016     TECHNIQUE:   Single radiographic view of the chest was obtained.     FINDINGS:  Heart size appears mildly enlarged. This may be accentuated by the  portable technique. Pulmonary vascular distribution is normal. The lungs  are clear. There are no  acute osseous abnormalities. There is no pleural  effusion or pneumothorax. A calcified granuloma projects of the right  upper quadrant the abdomen.       Impression:         1. Heart size appears larger than on the 2016 comparison. This could be  accentuated by the portable technique.  2. Otherwise, no acute chest findings.     Electronically Signed By-Radha Eddy MD On:1/5/2022 9:47 AM  This report was finalized on [id number] by  Radha Eddy MD.               I reviewed the patient's new clinical results.    Medication Review:   Scheduled Meds:atorvastatin, 10 mg, Oral, Daily  enoxaparin, 40 mg, Subcutaneous, Q24H  furosemide, 40 mg, Oral, Daily  lisinopril, 5 mg, Oral, Q24H  pantoprazole, 40 mg, Oral, QAM  PARoxetine, 20 mg, Oral, Nightly  sodium chloride, 3 mL, Intravenous, Q12H      Continuous Infusions:   PRN Meds:.•  acetaminophen  •  hydrALAZINE  •  melatonin  •  nitroglycerin  •  ondansetron **OR** ondansetron  •  [COMPLETED] Insert peripheral IV **AND** sodium chloride  •  [COMPLETED] Insert peripheral IV **AND** sodium chloride  •  sodium chloride  •  zolpidem     Assessment/Plan       Stable angina pectoris (HCC)    Acute systolic congestive heart failure (HCC)    Pulmonary hypertension (HCC)    Right ventricular dilation    Pt has ruled out for acute MI.  Has severe right sided heart failure/atrial enlargement/ventricular enlargement/pulmonary hypertension.  Improved with diuresis.  Anticipate heart cath and MARKIE tomorrow.    Plan for disposition:TBD    Olivia Garcia MD  01/06/22  09:03 EST

## 2022-01-06 NOTE — CASE MANAGEMENT/SOCIAL WORK
Discharge Planning Assessment  [facility]     Patient Name: Natalie Yin  MRN: [id number]  Today's Date: 1/6/2022    Admit Date: 1/5/2022     Discharge Needs Assessment     Row Name 01/06/22 1150       Living Environment    Lives With spouse    Current Living Arrangements home/apartment/condo    Primary Care Provided by self    Provides Primary Care For no one    Family Caregiver if Needed spouse    Quality of Family Relationships helpful    Able to Return to Prior Arrangements yes       Resource/Environmental Concerns    Resource/Environmental Concerns none    Transportation Concerns car, none       Transition Planning    Patient/Family Anticipates Transition to home with family    Patient/Family Anticipated Services at Transition none    Transportation Anticipated family or friend will provide       Discharge Needs Assessment    Readmission Within the Last 30 Days no previous admission in last 30 days    Equipment Currently Used at Home none    Concerns to be Addressed denies needs/concerns at this time; no discharge needs identified    Anticipated Changes Related to Illness none    Equipment Needed After Discharge none    Provided Post Acute Provider List? Refused               Discharge Plan     Row Name 01/06/22 7968       Plan    Plan Routine home    Patient/Family in Agreement with Plan yes    Plan Comments Met with patient at bedside, reports she lives at home with spouse. I with ADLs, still drives. Spouse to transport home on d/c. PCP and pharmacy confirmed. No issues affording food or medications. Currently denies any d/c needs or concerns. Cardiac cath and MARKIE tomorrow.                Expected Discharge Date and Time     Expected Discharge Date Expected Discharge Time    Jan 8, 2022          Demographic Summary     Row Name 01/06/22 1150       General Information    Admission Type inpatient    Arrived From emergency department    Required Notices Provided Important Message from Medicare    Referral Source  admission list    Reason for Consult discharge planning    Preferred Language English               Functional Status     Row Name 01/06/22 1150       Functional Status    Usual Activity Tolerance good    Current Activity Tolerance good       Functional Status, IADL    Medications independent    Meal Preparation independent    Housekeeping independent    Laundry independent    Shopping independent                    Patient Forms     Row Name 01/06/22 1152       Patient Forms    Important Message from Medicare (IMM) Delivered    Delivered to Patient    Method of delivery In person              Met with patient in room wearing PPE: mask     Maintained distance greater than six feet and spent less than 15 minutes in the room.          Lqauita Taylor RN

## 2022-01-07 NOTE — PLAN OF CARE
Goal Outcome Evaluation:  Plan of Care Reviewed With: patient        Progress: no change  Outcome Summary: Pt has had no complaints tonight.  Answered questions about her MARKIE and heart cath today.  Consents are signed and on the chart.  Ambien given at bedtime for sleep.

## 2022-01-07 NOTE — PROGRESS NOTES
Referring Provider: Olivia Garcia MD    Reason for follow-up:  Asked discomfort  Shortness of breath  Significant right atrial and right ventricle enlargement  Pulmonary hypertension     Patient Care Team:  Garett Barrientos MD as PCP - General    Subjective .      ROS    Since I have last seen, the patient has been without any chest discomfort ,shortness of breath, palpitations, dizziness or syncope.  Denies having any headache ,abdominal pain ,nausea, vomiting , diarrhea constipation, loss of weight or loss of appetite.  Denies having any excessive bruising ,hematuria or blood in the stool.    Review of all systems negative except as indicated    History  Past Medical History:   Diagnosis Date   • Anxiety and depression    • Arthritis    • DDD (degenerative disc disease), lumbar    • Diverticulosis    • GERD (gastroesophageal reflux disease)    • Hyperlipidemia    • PONV (postoperative nausea and vomiting)        Past Surgical History:   Procedure Laterality Date   • BLEPHAROPLASTY Bilateral 3/2/2021    Procedure: BILATERAL UPPER LID BLEPHAROPLASY;  Surgeon: Stanley Fulton MD;  Location: [facility] OR [facility] [place];  Service: Ophthalmology;  Laterality: Bilateral;   • BROW LIFT Bilateral 9/23/2021    Procedure: BILATERAL TEMPORAL DIRECT BROW LIFT, LEFT UPPER LID MULLERECTOMY;  Surgeon: Stanley Fulton MD;  Location: [facility];  Service: Ophthalmology;  Laterality: Bilateral;   • BUNIONECTOMY Right    • COLONOSCOPY     • ENDOSCOPY     • FACIAL COSMETIC SURGERY  06/2021   • HYSTERECTOMY  1990'S   • KNEE ARTHROPLASTY Bilateral 2013 2017   • LUMBAR DISC SURGERY  1990'S   • TONSILLECTOMY         Family History   Problem Relation Age of Onset   • Heart disease Mother    • Cancer Father    • Malig Hyperthermia Neg Hx        Social History     Tobacco Use   • Smoking status: Never Smoker   • Smokeless tobacco: Never Used   Substance Use Topics   • Alcohol use: Yes     Comment: SOCIAL USE   • Drug use: Never         Medications Prior to Admission   Medication Sig Dispense Refill Last Dose   • ascorbic acid (VITAMIN C) 1000 MG tablet Take 1,000 mg by mouth Daily.   Past Week at Unknown time   • Biotin 80530 MCG tablet Take 1,000 mcg by mouth Daily.   Past Week at Unknown time   • Calcium Carb-Cholecalciferol (Calcium 500 + D) 500-125 MG-UNIT tablet Take 1 tablet by mouth Daily.   Past Week at Unknown time   • ibuprofen (ADVIL,MOTRIN) 200 MG tablet Take 400 mg by mouth Every 6 (Six) Hours As Needed for Mild Pain .   Past Week at Unknown time   • omeprazole (priLOSEC) 20 MG capsule Take 20 mg by mouth Daily.   Past Week at Unknown time   • PARoxetine (PAXIL) 20 MG tablet Take 20 mg by mouth Every Night.   1/5/2022 at Unknown time   • simvastatin (ZOCOR) 20 MG tablet Take 20 mg by mouth Every Night.   1/5/2022 at Unknown time   • vitamin B-12 (CYANOCOBALAMIN) 1000 MCG tablet Take 1,000 mcg by mouth Daily.   Past Week at Unknown time   • vitamin E 400 UNIT capsule Take 400 Units by mouth Daily.   Past Week at Unknown time   • zolpidem (Ambien) 10 MG tablet Take 10 mg by mouth At Night As Needed for Sleep.   Past Week at Unknown time       Allergies  Patient has no known allergies.    Scheduled Meds:atorvastatin, 10 mg, Oral, Daily  enoxaparin, 40 mg, Subcutaneous, Q24H  furosemide, 40 mg, Oral, Daily  lisinopril, 5 mg, Oral, Q24H  pantoprazole, 40 mg, Oral, QAM  PARoxetine, 20 mg, Oral, Nightly  sodium chloride, 3 mL, Intravenous, Q12H      Continuous Infusions:   PRN Meds:.•  acetaminophen  •  hydrALAZINE  •  melatonin  •  nitroglycerin  •  ondansetron **OR** ondansetron  •  [COMPLETED] Insert peripheral IV **AND** sodium chloride  •  [COMPLETED] Insert peripheral IV **AND** sodium chloride  •  sodium chloride  •  zolpidem    Objective     VITAL SIGNS  Vitals:    01/06/22 1619 01/06/22 1931 01/06/22 2357 01/07/22 0352   BP: 103/61 143/83 135/77 124/76   BP Location: Right arm Right arm Right arm    Patient Position: Lying Lying  "Lying    Pulse: 82 89 73 74   Resp: 16 16 18 16   Temp: 98.2 °F (36.8 °C) 98 °F (36.7 °C) 98.2 °F (36.8 °C) 97.6 °F (36.4 °C)   TempSrc: Oral Oral Oral    SpO2: 96% 92% 93% 94%   Weight:       Height:           Flowsheet Rows      First Filed Value   Admission Height 165.1 cm (65\") Documented at 01/05/2022 0825   Admission Weight 68.9 kg (151 lb 14.4 oz) Documented at 01/05/2022 0825            Intake/Output Summary (Last 24 hours) at 1/7/2022 0627  Last data filed at 1/6/2022 1619  Gross per 24 hour   Intake 240 ml   Output --   Net 240 ml        TELEMETRY: Sinus rhythm    Physical Exam:  The patient is alert, oriented and in no distress.  Vital signs as noted above.  Head and neck revealed no carotid bruits or jugular venous distention.  No thyromegaly or lymphadenopathy is present  Lungs clear.  No wheezing.  Breath sounds are normal bilaterally.  Heart normal first and second heart sounds.  No murmur. No precordial rub is present.  No gallop is present.  Abdomen soft and nontender.  No organomegaly is present.  Extremities with good peripheral pulses without any pedal edema.  Skin warm and dry.  Musculoskeletal system is grossly normal  CNS grossly normal      Results Review:   I reviewed the patient's new clinical results.  Lab Results (last 24 hours)     Procedure Component Value Units Date/Time    Basic Metabolic Panel [[id number]]  (Abnormal) Collected: 01/07/22 0429    Specimen: Blood Updated: 01/07/22 0534     Glucose 108 mg/dL      BUN 16 mg/dL      Creatinine 0.80 mg/dL      Sodium 138 mmol/L      Potassium 3.5 mmol/L      Chloride 101 mmol/L      CO2 25.0 mmol/L      Calcium 9.1 mg/dL      eGFR Non African Amer 71 mL/min/1.73      BUN/Creatinine Ratio 20.0     Anion Gap 12.0 mmol/L     Narrative:      GFR Normal >60  Chronic Kidney Disease <60  Kidney Failure <15      Protime-INR [[id number]]  (Normal) Collected: 01/07/22 0429    Specimen: Blood Updated: 01/07/22 0526     Protime 10.9 Seconds      INR " 0.98    CBC & Differential [[id number]]  (Abnormal) Collected: 01/07/22 0429    Specimen: Blood Updated: 01/07/22 0504    Narrative:      The following orders were created for panel order CBC & Differential.  Procedure                               Abnormality         Status                     ---------                               -----------         ------                     CBC Auto Differential[[id number]]        Abnormal            Final result                 Please view results for these tests on the individual orders.    CBC Auto Differential [[id number]]  (Abnormal) Collected: 01/07/22 0429    Specimen: Blood Updated: 01/07/22 0504     WBC 7.90 10*3/mm3      RBC 4.15 10*6/mm3      Hemoglobin 12.7 g/dL      Hematocrit 37.8 %      MCV 91.0 fL      MCH 30.7 pg      MCHC 33.7 g/dL      RDW 14.6 %      RDW-SD 45.9 fl      MPV 8.1 fL      Platelets 310 10*3/mm3      Neutrophil % 69.2 %      Lymphocyte % 14.9 %      Monocyte % 10.6 %      Eosinophil % 4.4 %      Basophil % 0.9 %      Neutrophils, Absolute 5.40 10*3/mm3      Lymphocytes, Absolute 1.20 10*3/mm3      Monocytes, Absolute 0.80 10*3/mm3      Eosinophils, Absolute 0.30 10*3/mm3      Basophils, Absolute 0.10 10*3/mm3      nRBC 0.0 /100 WBC           Imaging Results (Last 24 Hours)     ** No results found for the last 24 hours. **      LAB RESULTS (LAST 7 DAYS)    CBC  Results from last 7 days   Lab Units 01/07/22 0429 01/06/22 0405 01/05/22  0915   WBC 10*3/mm3 7.90 6.10 6.80   RBC 10*6/mm3 4.15 4.06 3.81   HEMOGLOBIN g/dL 12.7 12.2 11.7*   HEMATOCRIT % 37.8 36.8 34.6   MCV fL 91.0 90.6 90.8   PLATELETS 10*3/mm3 310 312 301       BMP  Results from last 7 days   Lab Units 01/07/22 0429 01/06/22 0405 01/05/22  0915   SODIUM mmol/L 138 142 141   POTASSIUM mmol/L 3.5 3.6 3.5   CHLORIDE mmol/L 101 102 105   CO2 mmol/L 25.0 26.0 23.0   BUN mg/dL 16 11 12   CREATININE mg/dL 0.80 0.69 0.64   GLUCOSE mg/dL 108* 99 95   MAGNESIUM mg/dL  --  2.0  --        CMP    Results from last 7 days   Lab Units 01/07/22  0429 01/06/22  0405 01/05/22  0915   SODIUM mmol/L 138 142 141   POTASSIUM mmol/L 3.5 3.6 3.5   CHLORIDE mmol/L 101 102 105   CO2 mmol/L 25.0 26.0 23.0   BUN mg/dL 16 11 12   CREATININE mg/dL 0.80 0.69 0.64   GLUCOSE mg/dL 108* 99 95   ALBUMIN g/dL  --  3.90 3.70   BILIRUBIN mg/dL  --  0.4 0.2   ALK PHOS U/L  --  102 99   AST (SGOT) U/L  --  17 20   ALT (SGPT) U/L  --  26 31         BNP        TROPONIN  Results from last 7 days   Lab Units 01/05/22  0915   TROPONIN T ng/mL <0.010       CoAg  Results from last 7 days   Lab Units 01/07/22  0429 01/06/22  0405   INR  0.98 0.98   APTT seconds  --  26.5       Creatinine Clearance  Estimated Creatinine Clearance: 67.8 mL/min (by C-G formula based on SCr of 0.8 mg/dL).    ABG        Radiology  XR Chest 1 View    Result Date: 1/5/2022   1. Heart size appears larger than on the 2016 comparison. This could be accentuated by the portable technique. 2. Otherwise, no acute chest findings.  Electronically Signed By-Radha Eddy MD On:1/5/2022 9:47 AM This report was finalized on [id number] by  Radha Eddy MD.              EKG                  I personally viewed and interpreted the patient's EKG/Telemetry data: Normal sinus rhythm nonspecific ST-T wave changes    ECHOCARDIOGRAM:    Results for orders placed during the hospital encounter of 01/05/22    Adult Transthoracic Echo Complete W/ Cont if Necessary Per Protocol    Interpretation Summary  Indication  Chest pain  Dyspnea    Technically satisfactory study.  Mitral valve is structurally normal.  Tricuspid valve is structurally normal.  Moderate to significant tricuspid regurgitation.  Significant pulmonary hypertension with pulmonary artery pressure 60 mmHg.  Aortic valve is structurally normal.  Pulmonic valve could not be well visualized.  No evidence for mitral or aortic regurgitation is seen by Doppler study.  Left atrium is normal in size.  Significant right atrium  right ventricle enlargement and prominent coronary sinus.  Left ventricular size and contractility is normal with ejection fraction of 60%.  Atrial septum is not visualized.  Aorta is normal.  No pericardial effusion or intracardiac thrombus is seen.    Impression  Moderate to significant tricuspid regurgitation.  Significant right atrium right ventricle enlargement.  Significant pulmonary hypertension with pulmonary artery pressure of 60 mmHg.  Normal left ventricular size and contractility with ejection fraction of 60%.          STRESS MYOVIEW:    Cardiolite (Tc-99m Sestamibi) stress test    CARDIAC CATHETERIZATION:            OTHER:         Assessment/Plan     Active Problems:    Acute systolic congestive heart failure (HCC)    Pulmonary hypertension (HCC)    Right ventricular dilation    Stable angina pectoris (HCC)      ]]]]]]]]]]]]]]]]]]]]]  Impression  ============  -Chest discomfort and shortness of breath.  Troponin levels are negative.  EKG showed sinus rhythm nonspecific ST-T wave changes     Echocardiogram showed significant right atrium right ventricle enlargement and pulmonary hypertension with pulmonary artery pressure of 60 mmHg.  Atrial septum could not be visualized.     -Hypertension anxiety depression GERD dyslipidemia     -Congestive heart failure probably right-sided.  Elevated BNP.  Chest x-ray showed cardiomegaly     -Status post facial cosmetic surgery hysterectomy bilateral knee arthroplasty lumbar disc surgery tonsillectomy blepharoplasty     -Family history is positive for coronary artery disease     -No known allergies     -Non-smoker  ===============  Plan  ==============  Patient has chest discomfort and shortness of breath.  Echocardiogram is significantly abnormal with failure to the right ventricle enlargement and pulmonary hypertension.  Probable right-sided heart failure.  Diuresis.  Close observation of renal function.  Patient would benefit from transesophageal echocardiogram to  assess atrial septum for atrial septal defect and to exclude secondary causes of normal hypertension.  Patient may have primary pulmonary hypertension.  Patient will have transesophageal echocardiogram and cardiac catheterization on today after patient receives diuresis.  Risks and benefits pros and cons of the MARKIE and right and left heart catheterization were discussed with patient including infection bleeding blood clot heart attack stroke allergic reaction to the dye renal dysfunction etc.  Follow-up labs ordered.  Further plan will depend on patient's progress.  ]]]]]]]]]]]]]]]]]]]]]    Date of procedure  1/7/2022    Procedure performed  Transesophageal echocardiogram and Doppler study.    Indications  Shortness of breath  Chest discomfort  Significantly enlarged right atrium and right ventricular and significant pulmonary hypertension  Assess for ASD.    Procedure  Anesthesia was provided by anesthesiologist Dr. Petit with intravenous diprivan.  MARKIE probe could be passed without difficulty.  Patient tolerated the procedure well.  No complications were noted.    Results  Technically satisfactory study.  Mitral valve is structurally normal.  Minimal mitral regurgitation is present.  Tricuspid valve is normal.  Significant tricuspid regurgitation is present.  Significant pulmonary hypertension with pulmonary artery pressure of 70 mmHg.  Aortic valve is normal.  Left atrium is enlarged.  Left ventricle is normal in size and contractility with ejection fraction of 60%  Significant right atrium right ventricle enlargement is present.  Atrial septum is hypermobile but appears to be intact without any PFO.  No pericardial effusion is present.  Aortic intimal thickening is present without clot.    Impression  Significant right atrial and right ventricular enlargement.  Significant pulmonary hypertension with pulmonary artery pressure of 70 mmHg.  Significant tricuspid regurgitation.  Mild mitral regurgitation.  Left  atrial enlargement  Atrial septum is hypermobile without any atrial septal defect or PFO.  No pericardial effusion or intracardiac thrombus is present.  Left ventricular size and contractility is normal with ejection fraction of 60%.      Cardiac catheterization 1/7/2022 revealed    Significant pulmonary hypertension (68/18) main pulmonary artery pressure at 31.  Normal left ventricular size and contractility with ejection fraction of 60%.  Normal coronary arteries.    RECOMMENDATIONS:  Patient likely has primary pulmonary hypertension.  Patient has significant right atrial right ventricle enlargement and significant tricuspid regurgitation.  Suggested pulmonary evaluation and treatment of primary pulmonary hypertension    ]]]]]]]]]]]]]]]]]]]]]      Chase Paige MD  01/07/22  06:27 EST

## 2022-01-07 NOTE — ANESTHESIA PREPROCEDURE EVALUATION
Anesthesia Evaluation     Patient summary reviewed and Nursing notes reviewed   history of anesthetic complications: PONV  NPO Solid Status: > 8 hours  NPO Liquid Status: > 8 hours           Airway   Mallampati: II  TM distance: >3 FB  Neck ROM: full  No difficulty expected  Dental - normal exam     Pulmonary - negative pulmonary ROS and normal exam    breath sounds clear to auscultation  Cardiovascular - normal exam  Exercise tolerance: unable to assess    ECG reviewed  Rhythm: regular  Rate: normal    (+) angina, CHF Diastolic >=55%, hyperlipidemia,     ROS comment: Impression  Moderate to significant tricuspid regurgitation.  Significant right atrium right ventricle enlargement.  Significant pulmonary hypertension with pulmonary artery pressure of 60 mmHg.  Normal left ventricular size and contractility with ejection fraction of 60%.    Patient Hx Of Height, Weight, and Vitals    ?? Neg Stress    Neuro/Psych  (+) psychiatric history Anxiety,     GI/Hepatic/Renal/Endo    (+) obesity,  GERD,      Musculoskeletal     Abdominal  - normal exam   Substance History - negative use     OB/GYN negative ob/gyn ROS         Other   arthritis,                      Anesthesia Plan    ASA 4     MAC     intravenous induction     Anesthetic plan, all risks, benefits, and alternatives have been provided, discussed and informed consent has been obtained with: patient.

## 2022-01-07 NOTE — PROGRESS NOTES
LOS: 1 day   Patient Care Team:  Garett Barrientos MD as PCP - General    Subjective     Denies any complaints today      Review of Systems   Constitutional: Positive for activity change and fatigue.   Respiratory: Positive for chest tightness. Negative for cough and shortness of breath.    Cardiovascular: Negative for chest pain and leg swelling.   Gastrointestinal: Negative.  Negative for abdominal pain, constipation, diarrhea, nausea and vomiting.   Genitourinary: Negative.    Neurological: Positive for headaches. Negative for dizziness and weakness.           Objective     Vital Signs  Temp:  [97.6 °F (36.4 °C)-98.2 °F (36.8 °C)] 97.8 °F (36.6 °C)  Heart Rate:  [69-94] 83  Resp:  [14-20] 17  BP: ()/(33-83) 117/73      Physical Exam  Constitutional:       Appearance: Normal appearance.   HENT:      Head: Normocephalic and atraumatic.      Right Ear: External ear normal.      Left Ear: External ear normal.      Nose: Nose normal.      Mouth/Throat:      Mouth: Mucous membranes are moist.      Pharynx: Oropharynx is clear.   Eyes:      Conjunctiva/sclera: Conjunctivae normal.   Cardiovascular:      Rate and Rhythm: Normal rate and regular rhythm.      Pulses: Normal pulses.      Heart sounds: Normal heart sounds.   Pulmonary:      Effort: Pulmonary effort is normal.      Breath sounds: Normal breath sounds. No wheezing or rhonchi.   Chest:      Chest wall: No tenderness.   Abdominal:      General: Bowel sounds are normal.      Palpations: Abdomen is soft.      Tenderness: There is no abdominal tenderness. There is no guarding.   Musculoskeletal:         General: No swelling. Normal range of motion.      Cervical back: Normal range of motion and neck supple.      Right lower leg: No edema.      Left lower leg: No edema.   Skin:     General: Skin is warm and dry.   Neurological:      Mental Status: She is alert and oriented to person, place, and time.   Psychiatric:         Behavior: Behavior normal.               Results Review:    Lab Results (last 24 hours)     Procedure Component Value Units Date/Time    Basic Metabolic Panel [[id number]]  (Abnormal) Collected: 01/07/22 0429    Specimen: Blood Updated: 01/07/22 0534     Glucose 108 mg/dL      BUN 16 mg/dL      Creatinine 0.80 mg/dL      Sodium 138 mmol/L      Potassium 3.5 mmol/L      Chloride 101 mmol/L      CO2 25.0 mmol/L      Calcium 9.1 mg/dL      eGFR Non African Amer 71 mL/min/1.73      BUN/Creatinine Ratio 20.0     Anion Gap 12.0 mmol/L     Narrative:      GFR Normal >60  Chronic Kidney Disease <60  Kidney Failure <15      Protime-INR [[id number]]  (Normal) Collected: 01/07/22 0429    Specimen: Blood Updated: 01/07/22 0526     Protime 10.9 Seconds      INR 0.98    CBC & Differential [[id number]]  (Abnormal) Collected: 01/07/22 0429    Specimen: Blood Updated: 01/07/22 0504    Narrative:      The following orders were created for panel order CBC & Differential.  Procedure                               Abnormality         Status                     ---------                               -----------         ------                     CBC Auto Differential[[id number]]        Abnormal            Final result                 Please view results for these tests on the individual orders.    CBC Auto Differential [[id number]]  (Abnormal) Collected: 01/07/22 0429    Specimen: Blood Updated: 01/07/22 0504     WBC 7.90 10*3/mm3      RBC 4.15 10*6/mm3      Hemoglobin 12.7 g/dL      Hematocrit 37.8 %      MCV 91.0 fL      MCH 30.7 pg      MCHC 33.7 g/dL      RDW 14.6 %      RDW-SD 45.9 fl      MPV 8.1 fL      Platelets 310 10*3/mm3      Neutrophil % 69.2 %      Lymphocyte % 14.9 %      Monocyte % 10.6 %      Eosinophil % 4.4 %      Basophil % 0.9 %      Neutrophils, Absolute 5.40 10*3/mm3      Lymphocytes, Absolute 1.20 10*3/mm3      Monocytes, Absolute 0.80 10*3/mm3      Eosinophils, Absolute 0.30 10*3/mm3      Basophils, Absolute 0.10 10*3/mm3      nRBC 0.0 /100 WBC             Imaging Results (Last 24 Hours)     ** No results found for the last 24 hours. **               I reviewed the patient's new clinical results.    Medication Review:   Scheduled Meds:atorvastatin, 10 mg, Oral, Daily  enoxaparin, 40 mg, Subcutaneous, Q24H  furosemide, 40 mg, Oral, Daily  lisinopril, 5 mg, Oral, Q24H  pantoprazole, 40 mg, Oral, QAM  PARoxetine, 20 mg, Oral, Nightly  sodium chloride, 3 mL, Intravenous, Q12H      Continuous Infusions:   PRN Meds:.•  acetaminophen  •  hydrALAZINE  •  melatonin  •  nitroglycerin  •  ondansetron **OR** ondansetron  •  [COMPLETED] Insert peripheral IV **AND** sodium chloride  •  [COMPLETED] Insert peripheral IV **AND** sodium chloride  •  sodium chloride  •  zolpidem     Interval History:    Assessment/Plan     Stable angina pectoris  Acute systolic congestive heart failure  Pulmonary hypertension  Right ventricular dilation  -MARKIE this am  -heart cath this afternoon  -cardiology following  -CXR with cardiomegaly       Hypertension  -start lisinopril  -hydralazine prn     HDL  -statin     Mood disorder  -continue home medication     Insomnia  -continue home medication     Plan for disposition: Home    Vita Harrell, APRN  01/07/22  11:52 EST

## 2022-01-07 NOTE — CASE MANAGEMENT/SOCIAL WORK
Continued Stay Note  THERESA Villagran     Patient Name: Natalie Yin  MRN: [id number]  Today's Date: 1/7/2022    Admit Date: 1/5/2022     Discharge Plan     Row Name 01/07/22 1310       Plan    Plan Anticipate routine home              Phone communication or documentation only - no physical contact with patient or family.    Laquita Taylor RN

## 2022-01-07 NOTE — H&P (VIEW-ONLY)
Referring Provider: Olivia Garcia MD    Reason for follow-up:  Asked discomfort  Shortness of breath  Significant right atrial and right ventricle enlargement  Pulmonary hypertension     Patient Care Team:  Garett Barrientos MD as PCP - General    Subjective .      ROS    Since I have last seen, the patient has been without any chest discomfort ,shortness of breath, palpitations, dizziness or syncope.  Denies having any headache ,abdominal pain ,nausea, vomiting , diarrhea constipation, loss of weight or loss of appetite.  Denies having any excessive bruising ,hematuria or blood in the stool.    Review of all systems negative except as indicated    History  Past Medical History:   Diagnosis Date   • Anxiety and depression    • Arthritis    • DDD (degenerative disc disease), lumbar    • Diverticulosis    • GERD (gastroesophageal reflux disease)    • Hyperlipidemia    • PONV (postoperative nausea and vomiting)        Past Surgical History:   Procedure Laterality Date   • BLEPHAROPLASTY Bilateral 3/2/2021    Procedure: BILATERAL UPPER LID BLEPHAROPLASY;  Surgeon: Stanley Fulton MD;  [facility] [place];  Service: Ophthalmology;  Laterality: Bilateral;   • BROW LIFT Bilateral 9/23/2021    Procedure: BILATERAL TEMPORAL DIRECT BROW LIFT, LEFT UPPER LID MULLERECTOMY;  Surgeon: Stanley Fulton MD;  Location: [facility];  Service: Ophthalmology;  Laterality: Bilateral;   • BUNIONECTOMY Right    • COLONOSCOPY     • ENDOSCOPY     • FACIAL COSMETIC SURGERY  06/2021   • HYSTERECTOMY  1990'S   • KNEE ARTHROPLASTY Bilateral 2013 2017   • LUMBAR DISC SURGERY  1990'S   • TONSILLECTOMY         Family History   Problem Relation Age of Onset   • Heart disease Mother    • Cancer Father    • Malig Hyperthermia Neg Hx        Social History     Tobacco Use   • Smoking status: Never Smoker   • Smokeless tobacco: Never Used   Substance Use Topics   • Alcohol use: Yes     Comment: SOCIAL USE   • Drug use: Never         Medications Prior to Admission   Medication Sig Dispense Refill Last Dose   • ascorbic acid (VITAMIN C) 1000 MG tablet Take 1,000 mg by mouth Daily.   Past Week at Unknown time   • Biotin 52227 MCG tablet Take 1,000 mcg by mouth Daily.   Past Week at Unknown time   • Calcium Carb-Cholecalciferol (Calcium 500 + D) 500-125 MG-UNIT tablet Take 1 tablet by mouth Daily.   Past Week at Unknown time   • ibuprofen (ADVIL,MOTRIN) 200 MG tablet Take 400 mg by mouth Every 6 (Six) Hours As Needed for Mild Pain .   Past Week at Unknown time   • omeprazole (priLOSEC) 20 MG capsule Take 20 mg by mouth Daily.   Past Week at Unknown time   • PARoxetine (PAXIL) 20 MG tablet Take 20 mg by mouth Every Night.   1/5/2022 at Unknown time   • simvastatin (ZOCOR) 20 MG tablet Take 20 mg by mouth Every Night.   1/5/2022 at Unknown time   • vitamin B-12 (CYANOCOBALAMIN) 1000 MCG tablet Take 1,000 mcg by mouth Daily.   Past Week at Unknown time   • vitamin E 400 UNIT capsule Take 400 Units by mouth Daily.   Past Week at Unknown time   • zolpidem (Ambien) 10 MG tablet Take 10 mg by mouth At Night As Needed for Sleep.   Past Week at Unknown time       Allergies  Patient has no known allergies.    Scheduled Meds:atorvastatin, 10 mg, Oral, Daily  enoxaparin, 40 mg, Subcutaneous, Q24H  furosemide, 40 mg, Oral, Daily  lisinopril, 5 mg, Oral, Q24H  pantoprazole, 40 mg, Oral, QAM  PARoxetine, 20 mg, Oral, Nightly  sodium chloride, 3 mL, Intravenous, Q12H      Continuous Infusions:   PRN Meds:.•  acetaminophen  •  hydrALAZINE  •  melatonin  •  nitroglycerin  •  ondansetron **OR** ondansetron  •  [COMPLETED] Insert peripheral IV **AND** sodium chloride  •  [COMPLETED] Insert peripheral IV **AND** sodium chloride  •  sodium chloride  •  zolpidem    Objective     VITAL SIGNS  Vitals:    01/06/22 1619 01/06/22 1931 01/06/22 2357 01/07/22 0352   BP: 103/61 143/83 135/77 124/76   BP Location: Right arm Right arm Right arm    Patient Position: Lying Lying  "Lying    Pulse: 82 89 73 74   Resp: 16 16 18 16   Temp: 98.2 °F (36.8 °C) 98 °F (36.7 °C) 98.2 °F (36.8 °C) 97.6 °F (36.4 °C)   TempSrc: Oral Oral Oral    SpO2: 96% 92% 93% 94%   Weight:       Height:           Flowsheet Rows      First Filed Value   Admission Height 165.1 cm (65\") Documented at 01/05/2022 0825   Admission Weight 68.9 kg (151 lb 14.4 oz) Documented at 01/05/2022 0825            Intake/Output Summary (Last 24 hours) at 1/7/2022 0627  Last data filed at 1/6/2022 1619  Gross per 24 hour   Intake 240 ml   Output --   Net 240 ml        TELEMETRY: Sinus rhythm    Physical Exam:  The patient is alert, oriented and in no distress.  Vital signs as noted above.  Head and neck revealed no carotid bruits or jugular venous distention.  No thyromegaly or lymphadenopathy is present  Lungs clear.  No wheezing.  Breath sounds are normal bilaterally.  Heart normal first and second heart sounds.  No murmur. No precordial rub is present.  No gallop is present.  Abdomen soft and nontender.  No organomegaly is present.  Extremities with good peripheral pulses without any pedal edema.  Skin warm and dry.  Musculoskeletal system is grossly normal  CNS grossly normal      Results Review:   I reviewed the patient's new clinical results.  Lab Results (last 24 hours)     Procedure Component Value Units Date/Time    Basic Metabolic Panel [[id number]]  (Abnormal) Collected: 01/07/22 0429    Specimen: Blood Updated: 01/07/22 0534     Glucose 108 mg/dL      BUN 16 mg/dL      Creatinine 0.80 mg/dL      Sodium 138 mmol/L      Potassium 3.5 mmol/L      Chloride 101 mmol/L      CO2 25.0 mmol/L      Calcium 9.1 mg/dL      eGFR Non African Amer 71 mL/min/1.73      BUN/Creatinine Ratio 20.0     Anion Gap 12.0 mmol/L     Narrative:      GFR Normal >60  Chronic Kidney Disease <60  Kidney Failure <15      Protime-INR [[id number]]  (Normal) Collected: 01/07/22 0429    Specimen: Blood Updated: 01/07/22 0526     Protime 10.9 Seconds      INR " 0.98    CBC & Differential [[id number]]  (Abnormal) Collected: 01/07/22 0429    Specimen: Blood Updated: 01/07/22 0504    Narrative:      The following orders were created for panel order CBC & Differential.  Procedure                               Abnormality         Status                     ---------                               -----------         ------                     CBC Auto Differential[[id number]]        Abnormal            Final result                 Please view results for these tests on the individual orders.    CBC Auto Differential [[id number]]  (Abnormal) Collected: 01/07/22 0429    Specimen: Blood Updated: 01/07/22 0504     WBC 7.90 10*3/mm3      RBC 4.15 10*6/mm3      Hemoglobin 12.7 g/dL      Hematocrit 37.8 %      MCV 91.0 fL      MCH 30.7 pg      MCHC 33.7 g/dL      RDW 14.6 %      RDW-SD 45.9 fl      MPV 8.1 fL      Platelets 310 10*3/mm3      Neutrophil % 69.2 %      Lymphocyte % 14.9 %      Monocyte % 10.6 %      Eosinophil % 4.4 %      Basophil % 0.9 %      Neutrophils, Absolute 5.40 10*3/mm3      Lymphocytes, Absolute 1.20 10*3/mm3      Monocytes, Absolute 0.80 10*3/mm3      Eosinophils, Absolute 0.30 10*3/mm3      Basophils, Absolute 0.10 10*3/mm3      nRBC 0.0 /100 WBC           Imaging Results (Last 24 Hours)     ** No results found for the last 24 hours. **      LAB RESULTS (LAST 7 DAYS)    CBC  Results from last 7 days   Lab Units 01/07/22 0429 01/06/22 0405 01/05/22  0915   WBC 10*3/mm3 7.90 6.10 6.80   RBC 10*6/mm3 4.15 4.06 3.81   HEMOGLOBIN g/dL 12.7 12.2 11.7*   HEMATOCRIT % 37.8 36.8 34.6   MCV fL 91.0 90.6 90.8   PLATELETS 10*3/mm3 310 312 301       BMP  Results from last 7 days   Lab Units 01/07/22 0429 01/06/22 0405 01/05/22  0915   SODIUM mmol/L 138 142 141   POTASSIUM mmol/L 3.5 3.6 3.5   CHLORIDE mmol/L 101 102 105   CO2 mmol/L 25.0 26.0 23.0   BUN mg/dL 16 11 12   CREATININE mg/dL 0.80 0.69 0.64   GLUCOSE mg/dL 108* 99 95   MAGNESIUM mg/dL  --  2.0  --        CMP    Results from last 7 days   Lab Units 01/07/22  0429 01/06/22  0405 01/05/22  0915   SODIUM mmol/L 138 142 141   POTASSIUM mmol/L 3.5 3.6 3.5   CHLORIDE mmol/L 101 102 105   CO2 mmol/L 25.0 26.0 23.0   BUN mg/dL 16 11 12   CREATININE mg/dL 0.80 0.69 0.64   GLUCOSE mg/dL 108* 99 95   ALBUMIN g/dL  --  3.90 3.70   BILIRUBIN mg/dL  --  0.4 0.2   ALK PHOS U/L  --  102 99   AST (SGOT) U/L  --  17 20   ALT (SGPT) U/L  --  26 31         BNP        TROPONIN  Results from last 7 days   Lab Units 01/05/22  0915   TROPONIN T ng/mL <0.010       CoAg  Results from last 7 days   Lab Units 01/07/22  0429 01/06/22  0405   INR  0.98 0.98   APTT seconds  --  26.5       Creatinine Clearance  Estimated Creatinine Clearance: 67.8 mL/min (by C-G formula based on SCr of 0.8 mg/dL).    ABG        Radiology  XR Chest 1 View    Result Date: 1/5/2022   1. Heart size appears larger than on the 2016 comparison. This could be accentuated by the portable technique. 2. Otherwise, no acute chest findings.  Electronically Signed By-Radha Eddy MD On:1/5/2022 9:47 AM This report was finalized on [id number] by  Radha Eddy MD.              EKG                  I personally viewed and interpreted the patient's EKG/Telemetry data: Normal sinus rhythm nonspecific ST-T wave changes    ECHOCARDIOGRAM:    Results for orders placed during the hospital encounter of 01/05/22    Adult Transthoracic Echo Complete W/ Cont if Necessary Per Protocol    Interpretation Summary  Indication  Chest pain  Dyspnea    Technically satisfactory study.  Mitral valve is structurally normal.  Tricuspid valve is structurally normal.  Moderate to significant tricuspid regurgitation.  Significant pulmonary hypertension with pulmonary artery pressure 60 mmHg.  Aortic valve is structurally normal.  Pulmonic valve could not be well visualized.  No evidence for mitral or aortic regurgitation is seen by Doppler study.  Left atrium is normal in size.  Significant right atrium  right ventricle enlargement and prominent coronary sinus.  Left ventricular size and contractility is normal with ejection fraction of 60%.  Atrial septum is not visualized.  Aorta is normal.  No pericardial effusion or intracardiac thrombus is seen.    Impression  Moderate to significant tricuspid regurgitation.  Significant right atrium right ventricle enlargement.  Significant pulmonary hypertension with pulmonary artery pressure of 60 mmHg.  Normal left ventricular size and contractility with ejection fraction of 60%.          STRESS MYOVIEW:    Cardiolite (Tc-99m Sestamibi) stress test    CARDIAC CATHETERIZATION:            OTHER:         Assessment/Plan     Active Problems:    Acute systolic congestive heart failure (HCC)    Pulmonary hypertension (HCC)    Right ventricular dilation    Stable angina pectoris (HCC)      ]]]]]]]]]]]]]]]]]]]]]  Impression  ============  -Chest discomfort and shortness of breath.  Troponin levels are negative.  EKG showed sinus rhythm nonspecific ST-T wave changes     Echocardiogram showed significant right atrium right ventricle enlargement and pulmonary hypertension with pulmonary artery pressure of 60 mmHg.  Atrial septum could not be visualized.     -Hypertension anxiety depression GERD dyslipidemia     -Congestive heart failure probably right-sided.  Elevated BNP.  Chest x-ray showed cardiomegaly     -Status post facial cosmetic surgery hysterectomy bilateral knee arthroplasty lumbar disc surgery tonsillectomy blepharoplasty     -Family history is positive for coronary artery disease     -No known allergies     -Non-smoker  ===============  Plan  ==============  Patient has chest discomfort and shortness of breath.  Echocardiogram is significantly abnormal with failure to the right ventricle enlargement and pulmonary hypertension.  Probable right-sided heart failure.  Diuresis.  Close observation of renal function.  Patient would benefit from transesophageal echocardiogram to  assess atrial septum for atrial septal defect and to exclude secondary causes of normal hypertension.  Patient may have primary pulmonary hypertension.  Patient will have transesophageal echocardiogram and cardiac catheterization on today after patient receives diuresis.  Risks and benefits pros and cons of the MARKIE and right and left heart catheterization were discussed with patient including infection bleeding blood clot heart attack stroke allergic reaction to the dye renal dysfunction etc.  Follow-up labs ordered.  Further plan will depend on patient's progress.  ]]]]]]]]]]]]]]]]]]]]]    Date of procedure  1/7/2022    Procedure performed  Transesophageal echocardiogram and Doppler study.    Indications  Shortness of breath  Chest discomfort  Significantly enlarged right atrium and right ventricular and significant pulmonary hypertension  Assess for ASD.    Procedure  Anesthesia was provided by anesthesiologist Dr. Petit with intravenous diprivan.  MARKIE probe could be passed without difficulty.  Patient tolerated the procedure well.  No complications were noted.    Results  Technically satisfactory study.  Mitral valve is structurally normal.  Minimal mitral regurgitation is present.  Tricuspid valve is normal.  Significant tricuspid regurgitation is present.  Significant pulmonary hypertension with pulmonary artery pressure of 70 mmHg.  Aortic valve is normal.  Left atrium is enlarged.  Left ventricle is normal in size and contractility with ejection fraction of 60%  Significant right atrium right ventricle enlargement is present.  Atrial septum is hypermobile but appears to be intact without any PFO.  No pericardial effusion is present.  Aortic intimal thickening is present without clot.    Impression  Significant right atrial and right ventricular enlargement.  Significant pulmonary hypertension with pulmonary artery pressure of 70 mmHg.  Significant tricuspid regurgitation.  Mild mitral regurgitation.  Left  atrial enlargement  Atrial septum is hypermobile without any atrial septal defect or PFO.  No pericardial effusion or intracardiac thrombus is present.  Left ventricular size and contractility is normal with ejection fraction of 60%.      Chase Paige MD  01/07/22  06:27 EST

## 2022-01-08 NOTE — PLAN OF CARE
Goal Outcome Evaluation:  Plan of Care Reviewed With: patient        Progress: no change  Outcome Summary: pt had heart cath yesterday - no stents placed. pt has right atrial & ventricular enlargement with significant tricuspid regurgitation and pulmonary HTN. pulm consult for today. no complaints overnight, will continue to monitor pt

## 2022-01-08 NOTE — PROGRESS NOTES
LOS: 2 days   Patient Care Team:  Garett Barrientos MD as PCP - General    Subjective     Denies any complaints today      Review of Systems   Constitutional: Positive for activity change. Negative for fatigue.   Respiratory: Negative for cough, chest tightness and shortness of breath.    Cardiovascular: Negative for chest pain and leg swelling.   Gastrointestinal: Negative.  Negative for abdominal pain, constipation, diarrhea, nausea and vomiting.   Genitourinary: Negative.    Neurological: Negative for dizziness, weakness and headaches.           Objective     Vital Signs  Temp:  [97.3 °F (36.3 °C)-98.4 °F (36.9 °C)] 97.3 °F (36.3 °C)  Heart Rate:  [61-79] 65  Resp:  [15-18] 18  BP: ()/(51-97) 113/65      Physical Exam  Constitutional:       Appearance: Normal appearance.   HENT:      Head: Normocephalic and atraumatic.      Right Ear: External ear normal.      Left Ear: External ear normal.      Nose: Nose normal.      Mouth/Throat:      Mouth: Mucous membranes are moist.      Pharynx: Oropharynx is clear.   Eyes:      Conjunctiva/sclera: Conjunctivae normal.   Cardiovascular:      Rate and Rhythm: Normal rate and regular rhythm.      Pulses: Normal pulses.      Heart sounds: Normal heart sounds.   Pulmonary:      Effort: Pulmonary effort is normal.      Breath sounds: Normal breath sounds. No wheezing or rhonchi.   Chest:      Chest wall: No tenderness.   Abdominal:      General: Bowel sounds are normal.      Palpations: Abdomen is soft.      Tenderness: There is no abdominal tenderness. There is no guarding.   Musculoskeletal:         General: No swelling. Normal range of motion.      Cervical back: Normal range of motion and neck supple.      Right lower leg: No edema.      Left lower leg: No edema.   Skin:     General: Skin is warm and dry.   Neurological:      Mental Status: She is alert and oriented to person, place, and time.   Psychiatric:         Behavior: Behavior normal.              Results  Review:    Lab Results (last 24 hours)     ** No results found for the last 24 hours. **           Imaging Results (Last 24 Hours)     ** No results found for the last 24 hours. **               I reviewed the patient's new clinical results.    Medication Review:   Scheduled Meds:atorvastatin, 10 mg, Oral, Daily  enoxaparin, 40 mg, Subcutaneous, Q24H  furosemide, 40 mg, Oral, Daily  lisinopril, 5 mg, Oral, Q24H  pantoprazole, 40 mg, Oral, QAM  PARoxetine, 20 mg, Oral, Nightly  sodium chloride, 3 mL, Intravenous, Q12H      Continuous Infusions:   PRN Meds:.•  acetaminophen  •  acetaminophen  •  atropine  •  diphenhydrAMINE  •  hydrALAZINE  •  melatonin  •  nitroglycerin  •  ondansetron **OR** ondansetron  •  ondansetron **OR** ondansetron  •  [COMPLETED] Insert peripheral IV **AND** sodium chloride  •  [COMPLETED] Insert peripheral IV **AND** sodium chloride  •  sodium chloride  •  sodium chloride  •  zolpidem     Interval History:    Assessment/Plan     Stable angina pectoris  Acute systolic congestive heart failure  Pulmonary hypertension  Right ventricular dilation  - Significant right atrial and right ventricular enlargement.  Significant pulmonary hypertension with pulmonary artery pressure of 70 mmHg.  Significant tricuspid regurgitation.  Mild mitral regurgitation.  Left atrial enlargement  -pulmonology to evaluate        Hypertension  -start lisinopril  -hydralazine prn     HDL  -statin     Mood disorder  -continue home medication     Insomnia  -continue home medication     Plan for disposition: Home    Vita Harrell, APRN  01/08/22  12:07 EST

## 2022-01-08 NOTE — CONSULTS
[facility]: [facility]         CONSULT NOTE    Patient Identification:  Natalie Yin  71 y.o.  female  1950  [id number]            Requesting physician: Attending physician    Reason for Consultation: Pulmonary hypertension      History of Present Illness:  71 y.o. female who presents with medical history of GERD, degenerative disc disease, hyperlipidemia, anxiety and depression.  Patient states that she has been having intermittent chest discomfort for over 2 months and that is was relieved with Tylenol.  However in the last 2 weeks her shortness of breath has gotten worse and at times she has to sit up in bed.  She denies any nausea vomiting or diarrhea. She does not smoke, and does not drink. She state that she has had an EGD and colonoscopy with EGD she did have some dilation for swallowing difficulties. She states that she woke up this morning at 4 AM with chest tightness that radiated down her left arm and she could not catch her breath so she decided to come into the emergency department.  Patient seen by cardiology transesophageal echo was done showed pulmonary hypertension        Assessment:    Pulmonary hypertension status post MARKIE 1/7/2021  Significant right atrial and right ventricular enlargement.  Significant pulmonary hypertension with pulmonary artery pressure of 70 mmHg.  Significant tricuspid regurgitation.  Mild mitral regurgitation.  Left atrial enlargement  Atrial septum is hypermobile without any atrial septal defect or PFO.  No pericardial effusion or intracardiac thrombus is present.  Left ventricular size and contractility is normal with ejection fraction of 60%.        Recommendations:    Work-up for primary pulmonary hypertension  Connective tissue disease work-up  Overnight oximetry  Pulmonary function test and sleep study as an outpatient          Review of Sytems:  Review of Systems   Respiratory: Positive for cough and shortness of breath.        Past Medical  History:  Past Medical History:   Diagnosis Date   • Anxiety and depression    • Arthritis    • DDD (degenerative disc disease), lumbar    • Diverticulosis    • GERD (gastroesophageal reflux disease)    • Hyperlipidemia    • PONV (postoperative nausea and vomiting)        Past Surgical History:  Past Surgical History:   Procedure Laterality Date   • BLEPHAROPLASTY Bilateral 3/2/2021    Procedure: BILATERAL UPPER LID BLEPHAROPLASY;  Surgeon: Stanley Fulton MD;  Location: [facility];  Service: Ophthalmology;  Laterality: Bilateral;   • BROW LIFT Bilateral 9/23/2021    Procedure: BILATERAL TEMPORAL DIRECT BROW LIFT, LEFT UPPER LID MULLERECTOMY;  Surgeon: Stanley Fulton MD;  Location: [facility] OR [facility];  Service: Ophthalmology;  Laterality: Bilateral;   • BUNIONECTOMY Right    • COLONOSCOPY     • ENDOSCOPY     • FACIAL COSMETIC SURGERY  06/2021   • HYSTERECTOMY  1990'S   • KNEE ARTHROPLASTY Bilateral 2013 2017   • LUMBAR DISC SURGERY  1990'S   • TONSILLECTOMY          Home Meds:  Medications Prior to Admission   Medication Sig Dispense Refill Last Dose   • ascorbic acid (VITAMIN C) 1000 MG tablet Take 1,000 mg by mouth Daily.   Past Week at Unknown time   • Biotin 44323 MCG tablet Take 1,000 mcg by mouth Daily.   Past Week at Unknown time   • Calcium Carb-Cholecalciferol (Calcium 500 + D) 500-125 MG-UNIT tablet Take 1 tablet by mouth Daily.   Past Week at Unknown time   • ibuprofen (ADVIL,MOTRIN) 200 MG tablet Take 400 mg by mouth Every 6 (Six) Hours As Needed for Mild Pain .   Past Week at Unknown time   • omeprazole (priLOSEC) 20 MG capsule Take 20 mg by mouth Daily.   Past Week at Unknown time   • PARoxetine (PAXIL) 20 MG tablet Take 20 mg by mouth Every Night.   1/5/2022 at Unknown time   • simvastatin (ZOCOR) 20 MG tablet Take 20 mg by mouth Every Night.   1/5/2022 at Unknown time   • vitamin B-12 (CYANOCOBALAMIN) 1000 MCG tablet Take 1,000 mcg by mouth Daily.   Past Week at Unknown time   • vitamin E  "400 UNIT capsule Take 400 Units by mouth Daily.   Past Week at Unknown time   • zolpidem (Ambien) 10 MG tablet Take 10 mg by mouth At Night As Needed for Sleep.   Past Week at Unknown time       Allergies:  No Known Allergies    Social History:   Social History     Socioeconomic History   • Marital status:    Tobacco Use   • Smoking status: Never Smoker   • Smokeless tobacco: Never Used   Substance and Sexual Activity   • Alcohol use: Yes     Comment: SOCIAL USE   • Drug use: Never   • Sexual activity: Defer       Family History:  Family History   Problem Relation Age of Onset   • Heart disease Mother    • Cancer Father    • Malig Hyperthermia Neg Hx        Physical Exam:  /67 (BP Location: Right arm, Patient Position: Lying)   Pulse 71   Temp 98 °F (36.7 °C) (Oral)   Resp 17   Ht 165.1 cm (65\")   Wt 66.2 kg (146 lb)   SpO2 97%   BMI 24.30 kg/m²  Body mass index is 24.3 kg/m². 97% 66.2 kg (146 lb)  Physical Exam  Cardiovascular:      Heart sounds: Murmur heard.       Pulmonary:      Breath sounds: Rhonchi present.         LABS:  Lab Results   Component Value Date    CALCIUM 9.1 01/07/2022     Results from last 7 days   Lab Units 01/07/22  0429 01/06/22  0405 01/06/22  0405 01/05/22  0915 01/05/22  0915   MAGNESIUM mg/dL  --   --  2.0  --   --    SODIUM mmol/L 138  --  142  --  141   POTASSIUM mmol/L 3.5  --  3.6  --  3.5   CHLORIDE mmol/L 101  --  102  --  105   CO2 mmol/L 25.0  --  26.0  --  23.0   BUN mg/dL 16  --  11  --  12   CREATININE mg/dL 0.80  --  0.69  --  0.64   GLUCOSE mg/dL 108*   < > 99   < > 95   CALCIUM mg/dL 9.1  --  9.2  --  9.4   WBC 10*3/mm3 7.90  --  6.10  --  6.80   HEMOGLOBIN g/dL 12.7  --  12.2  --  11.7*   PLATELETS 10*3/mm3 310  --  312  --  301   ALT (SGPT) U/L  --   --  26  --  31   AST (SGOT) U/L  --   --  17  --  20   PROBNP pg/mL  --   --   --   --  2,820.0*    < > = values in this interval not displayed.     Lab Results   Component Value Date    TROPONINT <0.010 " 01/05/2022     Results from last 7 days   Lab Units 01/05/22  0915   TROPONIN T ng/mL <0.010                     Results from last 7 days   Lab Units 01/07/22  0429 01/06/22  0405   INR  0.98 0.98         Lab Results   Component Value Date    TSH 2.810 01/06/2022     Estimated Creatinine Clearance: 69.4 mL/min (by C-G formula based on SCr of 0.8 mg/dL).         Imaging:  Imaging Results (Last 24 Hours)     ** No results found for the last 24 hours. **            Current Meds:   SCHEDULE  atorvastatin, 10 mg, Oral, Daily  enoxaparin, 40 mg, Subcutaneous, Q24H  furosemide, 40 mg, Oral, Daily  lisinopril, 5 mg, Oral, Q24H  pantoprazole, 40 mg, Oral, QAM  PARoxetine, 20 mg, Oral, Nightly  sodium chloride, 3 mL, Intravenous, Q12H      Infusions     PRNs  •  acetaminophen  •  acetaminophen  •  atropine  •  diphenhydrAMINE  •  hydrALAZINE  •  melatonin  •  nitroglycerin  •  ondansetron **OR** ondansetron  •  ondansetron **OR** ondansetron  •  [COMPLETED] Insert peripheral IV **AND** sodium chloride  •  [COMPLETED] Insert peripheral IV **AND** sodium chloride  •  sodium chloride  •  sodium chloride  •  [email or web address] Langley MD  1/8/2022  11:29 EST      Much of this encounter note is an electronic transcription/translation of spoken language to printed text using Dragon Software.

## 2022-01-08 NOTE — PLAN OF CARE
Goal Outcome Evaluation:           Progress: no change  Outcome Summary: pt is currently resting in bed. pt has had minimal complaints through out the shift so far. pt has gotten up and walked around the unit during the shift. pt has pulmonary consult put in this morning, but they have not rounded yet on the pt. pt has been encouraged to turn to help prevent skin breakdown.

## 2022-01-09 NOTE — PLAN OF CARE
Goal Outcome Evaluation:  Plan of Care Reviewed With: patient        Progress: no change  Outcome Summary: will continue to monitor pt

## 2022-01-09 NOTE — PROGRESS NOTES
Daily Progress Note        Stable angina pectoris (HCC)    Acute systolic congestive heart failure (HCC)    Pulmonary hypertension (HCC)    Right ventricular dilation           Assessment:     Pulmonary hypertension status post MARKIE 1/7/2021  Significant right atrial and right ventricular enlargement.  Significant pulmonary hypertension with pulmonary artery pressure of 70 mmHg.  Significant tricuspid regurgitation.  Mild mitral regurgitation.  Left atrial enlargement  Atrial septum is hypermobile without any atrial septal defect or PFO.  No pericardial effusion or intracardiac thrombus is present.  Left ventricular size and contractility is normal with ejection fraction of 60%.           Recommendations:    Trial of Revatio 20 mg TID  Work-up for primary pulmonary hypertension  Connective tissue disease work-up  Overnight oximetry  Pulmonary function test and sleep study as an outpatient                LOS: 3 days     Subjective         Objective     Vital signs for last 24 hours:  Vitals:    01/08/22 2005 01/09/22 0005 01/09/22 0404 01/09/22 0810   BP: 120/66 114/63 100/60 113/66   BP Location: Right arm Right arm Right arm Right arm   Patient Position: Sitting Sitting Lying Lying   Pulse: 78 87 65 69   Resp: 18 18 18 17   Temp: 97.8 °F (36.6 °C) 98 °F (36.7 °C) 97.8 °F (36.6 °C) 98.3 °F (36.8 °C)   TempSrc: Oral Oral Oral Oral   SpO2: 96% 92% 94% 93%   Weight: 66.3 kg (146 lb 1.6 oz)      Height:           Intake/Output last 3 shifts:  I/O last 3 completed shifts:  In: 360 [P.O.:360]  Out: -   Intake/Output this shift:  No intake/output data recorded.      Radiology  Imaging Results (Last 24 Hours)     ** No results found for the last 24 hours. **          Labs:  Results from last 7 days   Lab Units 01/07/22 0429   WBC 10*3/mm3 7.90   HEMOGLOBIN g/dL 12.7   HEMATOCRIT % 37.8   PLATELETS 10*3/mm3 310     Results from last 7 days   Lab Units 01/07/22  0429 01/06/22  0405 01/06/22  0405   SODIUM mmol/L 138   < > 142    POTASSIUM mmol/L 3.5   < > 3.6   CHLORIDE mmol/L 101   < > 102   CO2 mmol/L 25.0   < > 26.0   BUN mg/dL 16   < > 11   CREATININE mg/dL 0.80   < > 0.69   CALCIUM mg/dL 9.1   < > 9.2   BILIRUBIN mg/dL  --   --  0.4   ALK PHOS U/L  --   --  102   ALT (SGPT) U/L  --   --  26   AST (SGOT) U/L  --   --  17   GLUCOSE mg/dL 108*   < > 99    < > = values in this interval not displayed.         Results from last 7 days   Lab Units 01/06/22  0405 01/05/22  0915   ALBUMIN g/dL 3.90 3.70     Results from last 7 days   Lab Units 01/05/22  0915   TROPONIN T ng/mL <0.010         Results from last 7 days   Lab Units 01/06/22  0405   MAGNESIUM mg/dL 2.0     Results from last 7 days   Lab Units 01/07/22  0429 01/06/22  0405   INR  0.98 0.98   APTT seconds  --  26.5     Results from last 7 days   Lab Units 01/06/22  0405   TSH uIU/mL 2.810           Meds:   SCHEDULE  atorvastatin, 10 mg, Oral, Daily  enoxaparin, 40 mg, Subcutaneous, Q24H  furosemide, 40 mg, Oral, Daily  lisinopril, 5 mg, Oral, Q24H  pantoprazole, 40 mg, Oral, QAM  PARoxetine, 20 mg, Oral, Nightly  sodium chloride, 3 mL, Intravenous, Q12H      Infusions     PRNs  •  acetaminophen  •  acetaminophen  •  atropine  •  diphenhydrAMINE  •  hydrALAZINE  •  melatonin  •  nitroglycerin  •  ondansetron **OR** ondansetron  •  ondansetron **OR** ondansetron  •  [COMPLETED] Insert peripheral IV **AND** sodium chloride  •  [COMPLETED] Insert peripheral IV **AND** sodium chloride  •  sodium chloride  •  sodium chloride  •  zolpidem    Physical Exam:  Physical Exam  Cardiovascular:      Heart sounds: Murmur heard.       Pulmonary:      Breath sounds: Rhonchi present.         ROS  Review of Systems   Respiratory: Positive for cough and shortness of breath.              Total time spent with patient greater than: 45 Minutes

## 2022-01-09 NOTE — DISCHARGE SUMMARY
Date of Discharge:  1/9/2022    Discharge Diagnosis:   Stable angina pectoris  Acute systolic congestive heart failure  Pulmonary hypertension  Right atrial and right ventricular enlargement  Significant tricuspid regurgitation  Left atrial enlargement    Presenting Problem/History of Present Illness  Active Hospital Problems    Diagnosis  POA   • Stable angina pectoris (HCC) [I20.8]  Yes   • Acute systolic congestive heart failure (HCC) [I50.21]  Unknown   • Pulmonary hypertension (HCC) [I27.20]  Unknown   • Right ventricular dilation [I51.7]  Unknown      Resolved Hospital Problems   No resolved problems to display.          Hospital Course    Patient is a 71 y.o. female presented with medical history of GERD, degenerative disc disease, hyperlipidemia, anxiety and depression. Patient states that she has been having intermittent chest discomfort for over 2 months and that is was relieved with Tylenol.  However in the last 2 weeks her shortness of breath has gotten worse and at times she has to sit up in bed. She states that she woke up this morning at 4 AM with chest tightness that radiated down her left arm and she could not catch her breath so she decided to come into the emergency department. In the ED she had a negative troponin EKG with no acute ischemia, and chest x-ray revealed cardiomegaly.  Patient did have an elevated BNP of over 2800 she was given 80 mg IV Lasix. Dr. Paige performed echo, MARKIE and cardiac cath that revealed the following:    Pulmonary hypertension status post MARKIE 1/7/2021  Significant right atrial and right ventricular enlargement.  Significant pulmonary hypertension with pulmonary artery pressure of 70 mmHg.  Left ventricular size and contractility is normal with ejection fraction of 60%.    Pulmonology was consulted and started patient on revatio 20 mg TID; cardiology changed medications to include lisinopril and lasix. The patient today is alert, oriented, and hemodynamically stable.  It was discussed with pulmonology that the remainder of her work up to include PFT and sleep study can be done outpatient. She will be discharged today and follow up with PCP in 2 weeks and pulmonology in 2 weeks for further primary HTN lab results and scheduling of testing.    Procedures Performed    Procedure(s):  Left Heart Cath and coronary angiogram  Right Heart Cath  -------------------       Consults:   Consults     Date and Time Order Name Status Description    1/9/2022 10:01 AM Inpatient Pulmonology Consult      1/7/2022  5:01 PM Inpatient Pulmonology Consult Completed     1/5/2022  1:56 PM Inpatient Cardiology Consult Completed     1/5/2022 10:22 AM Family Medicine Consult            Pertinent Test Results:    Lab Results (most recent)     Procedure Component Value Units Date/Time    Basic Metabolic Panel [[id number]]  (Abnormal) Collected: 01/07/22 0429    Specimen: Blood Updated: 01/07/22 0534     Glucose 108 mg/dL      BUN 16 mg/dL      Creatinine 0.80 mg/dL      Sodium 138 mmol/L      Potassium 3.5 mmol/L      Chloride 101 mmol/L      CO2 25.0 mmol/L      Calcium 9.1 mg/dL      eGFR Non African Amer 71 mL/min/1.73      BUN/Creatinine Ratio 20.0     Anion Gap 12.0 mmol/L     Narrative:      GFR Normal >60  Chronic Kidney Disease <60  Kidney Failure <15      Protime-INR [[id number]]  (Normal) Collected: 01/07/22 0429    Specimen: Blood Updated: 01/07/22 0526     Protime 10.9 Seconds      INR 0.98    CBC & Differential [[id number]]  (Abnormal) Collected: 01/07/22 0429    Specimen: Blood Updated: 01/07/22 0504    Narrative:      The following orders were created for panel order CBC & Differential.  Procedure                               Abnormality         Status                     ---------                               -----------         ------                     CBC Auto Differential[[id number]]        Abnormal            Final result                 Please view results for these tests on the individual  orders.    CBC Auto Differential [[id number]]  (Abnormal) Collected: 01/07/22 0429    Specimen: Blood Updated: 01/07/22 0504     WBC 7.90 10*3/mm3      RBC 4.15 10*6/mm3      Hemoglobin 12.7 g/dL      Hematocrit 37.8 %      MCV 91.0 fL      MCH 30.7 pg      MCHC 33.7 g/dL      RDW 14.6 %      RDW-SD 45.9 fl      MPV 8.1 fL      Platelets 310 10*3/mm3      Neutrophil % 69.2 %      Lymphocyte % 14.9 %      Monocyte % 10.6 %      Eosinophil % 4.4 %      Basophil % 0.9 %      Neutrophils, Absolute 5.40 10*3/mm3      Lymphocytes, Absolute 1.20 10*3/mm3      Monocytes, Absolute 0.80 10*3/mm3      Eosinophils, Absolute 0.30 10*3/mm3      Basophils, Absolute 0.10 10*3/mm3      nRBC 0.0 /100 WBC     TSH [[id number]]  (Normal) Collected: 01/06/22 0405    Specimen: Blood Updated: 01/06/22 0537     TSH 2.810 uIU/mL     Comprehensive Metabolic Panel [[id number]] Collected: 01/06/22 0405    Specimen: Blood Updated: 01/06/22 0521     Glucose 99 mg/dL      BUN 11 mg/dL      Creatinine 0.69 mg/dL      Sodium 142 mmol/L      Potassium 3.6 mmol/L      Chloride 102 mmol/L      CO2 26.0 mmol/L      Calcium 9.2 mg/dL      Total Protein 7.1 g/dL      Albumin 3.90 g/dL      ALT (SGPT) 26 U/L      AST (SGOT) 17 U/L      Alkaline Phosphatase 102 U/L      Total Bilirubin 0.4 mg/dL      eGFR Non African Amer 84 mL/min/1.73      Globulin 3.2 gm/dL      A/G Ratio 1.2 g/dL      BUN/Creatinine Ratio 15.9     Anion Gap 14.0 mmol/L     Narrative:      GFR Normal >60  Chronic Kidney Disease <60  Kidney Failure <15      Magnesium [[id number]]  (Normal) Collected: 01/06/22 0405    Specimen: Blood Updated: 01/06/22 0521     Magnesium 2.0 mg/dL     Protime-INR [[id number]]  (Normal) Collected: 01/06/22 0405    Specimen: Blood Updated: 01/06/22 0518     Protime 10.9 Seconds      INR 0.98    aPTT [[id number]]  (Normal) Collected: 01/06/22 0405    Specimen: Blood Updated: 01/06/22 0518     PTT 26.5 seconds     CBC Auto Differential [[id number]]  (Normal)  Collected: 01/06/22 0405    Specimen: Blood Updated: 01/06/22 0501     WBC 6.10 10*3/mm3      RBC 4.06 10*6/mm3      Hemoglobin 12.2 g/dL      Hematocrit 36.8 %      MCV 90.6 fL      MCH 30.1 pg      MCHC 33.3 g/dL      RDW 14.7 %      RDW-SD 46.4 fl      MPV 8.3 fL      Platelets 312 10*3/mm3      Neutrophil % 61.3 %      Lymphocyte % 20.3 %      Monocyte % 11.4 %      Eosinophil % 6.1 %      Basophil % 0.9 %      Neutrophils, Absolute 3.70 10*3/mm3      Lymphocytes, Absolute 1.20 10*3/mm3      Monocytes, Absolute 0.70 10*3/mm3      Eosinophils, Absolute 0.40 10*3/mm3      Basophils, Absolute 0.10 10*3/mm3      nRBC 0.1 /100 WBC     COVID PRE-OP / PRE-PROCEDURE SCREENING ORDER (NO ISOLATION) - Swab, Nasopharynx [[id number]]  (Normal) Collected: 01/05/22 1006    Specimen: Swab from Nasopharynx Updated: 01/05/22 1035    Narrative:      The following orders were created for panel order COVID PRE-OP / PRE-PROCEDURE SCREENING ORDER (NO ISOLATION) - Swab, Nasopharynx.  Procedure                               Abnormality         Status                     ---------                               -----------         ------                     COVID-19,CEPHEID/VAHE,CO...[[id number]]  Normal              Final result                 Please view results for these tests on the individual orders.    COVID-19,CEPHEID/VAHE,COR/BRANDI/PAD/MONA IN-HOUSE(OR EMERGENT/ADD-ON),NP SWAB IN TRANSPORT MEDIA 3-4 HR TAT, RT-PCR - Swab, Nasopharynx [[id number]]  (Normal) Collected: 01/05/22 1006    Specimen: Swab from Nasopharynx Updated: 01/05/22 1035     COVID19 Not Detected    Narrative:      Fact sheet for providers: https://www.fda.gov/media/543641/download     Fact sheet for patients: https://www.fda.gov/media/746270/download  Fact sheet for providers: https://www.fda.gov/media/493235/download    Fact sheet for patients: https://www.fda.gov/media/674656/download    Test performed by PCR.    Comprehensive Metabolic Panel [[id number]] Collected:  01/05/22 0915    Specimen: Blood from Arm, Left Updated: 01/05/22 0953     Glucose 95 mg/dL      BUN 12 mg/dL      Creatinine 0.64 mg/dL      Sodium 141 mmol/L      Potassium 3.5 mmol/L      Chloride 105 mmol/L      CO2 23.0 mmol/L      Calcium 9.4 mg/dL      Total Protein 6.8 g/dL      Albumin 3.70 g/dL      ALT (SGPT) 31 U/L      AST (SGOT) 20 U/L      Alkaline Phosphatase 99 U/L      Total Bilirubin 0.2 mg/dL      eGFR Non African Amer 92 mL/min/1.73      Globulin 3.1 gm/dL      A/G Ratio 1.2 g/dL      BUN/Creatinine Ratio 18.8     Anion Gap 13.0 mmol/L     Narrative:      GFR Normal >60  Chronic Kidney Disease <60  Kidney Failure <15      Troponin [[id number]]  (Normal) Collected: 01/05/22 0915    Specimen: Blood from Arm, Left Updated: 01/05/22 0953     Troponin T <0.010 ng/mL     Narrative:      Troponin T Reference Range:  <= 0.03 ng/mL-   Negative for AMI  >0.03 ng/mL-     Abnormal for myocardial necrosis.  Clinicians would have to utilize clinical acumen, EKG, Troponin and serial changes to determine if it is an Acute Myocardial Infarction or myocardial injury due to an underlying chronic condition.       Results may be falsely decreased if patient taking Biotin.      BNP [[id number]]  (Abnormal) Collected: 01/05/22 0915    Specimen: Blood from Arm, Left Updated: 01/05/22 0951     proBNP 2,820.0 pg/mL     Narrative:      Among patients with dyspnea, NT-proBNP is highly sensitive for the detection of acute congestive heart failure. In addition NT-proBNP of <300 pg/ml effectively rules out acute congestive heart failure with 99% negative predictive value.    Results may be falsely decreased if patient taking Biotin.      Extra Tubes [[id number]] Collected: 01/05/22 0915    Specimen: Blood from Arm, Left Updated: 01/05/22 0937    Narrative:      The following orders were created for panel order Extra Tubes.  Procedure                               Abnormality         Status                     ---------                                -----------         ------                     Gold Top - SST[[id number]]                                   Final result                 Please view results for these tests on the individual orders.    Gold Top - SST [[id number]] Collected: 01/05/22 0915    Specimen: Blood from Arm, Left Updated: 01/05/22 0937    CBC & Differential [[id number]]  (Abnormal) Collected: 01/05/22 0915    Specimen: Blood from Arm, Left Updated: 01/05/22 0924    Narrative:      The following orders were created for panel order CBC & Differential.  Procedure                               Abnormality         Status                     ---------                               -----------         ------                     CBC Auto Differential[[id number]]        Abnormal            Final result                 Please view results for these tests on the individual orders.           Results for orders placed during the hospital encounter of 01/05/22    Adult Transesophageal Echo (MARKIE) W/ Cont if Necessary Per Protocol    Interpretation Summary  Date of procedure  1/7/2022    Procedure performed  Transesophageal echocardiogram and Doppler study.    Indications  Shortness of breath  Chest discomfort  Significantly enlarged right atrium and right ventricular and significant pulmonary hypertension  Assess for ASD.    Procedure  Anesthesia was provided by anesthesiologist Dr. Petit with intravenous diprivan.  MARKIE probe could be passed without difficulty.  Patient tolerated the procedure well.  No complications were noted.    Results  Technically satisfactory study.  Mitral valve is structurally normal.  Minimal mitral regurgitation is present.  Tricuspid valve is normal.  Significant tricuspid regurgitation is present.  Significant pulmonary hypertension with pulmonary artery pressure of 70 mmHg.  Aortic valve is normal.  Left atrium is enlarged.  Left ventricle is normal in size and contractility with ejection fraction of  60%  Significant right atrium right ventricle enlargement is present.  Atrial septum is hypermobile but appears to be intact without any PFO.  No pericardial effusion is present.  Aortic intimal thickening is present without clot.    Impression  Significant right atrial and right ventricular enlargement.  Significant pulmonary hypertension with pulmonary artery pressure of 70 mmHg.  Significant tricuspid regurgitation.  Mild mitral regurgitation.  Left atrial enlargement  Atrial septum is hypermobile without any atrial septal defect or PFO.  No pericardial effusion or intracardiac thrombus is present.  Left ventricular size and contractility is normal with ejection fraction of 60%.       Condition on Discharge:  Stable    Vital Signs  Temp:  [97.8 °F (36.6 °C)-98.3 °F (36.8 °C)] 98.2 °F (36.8 °C)  Heart Rate:  [65-87] 71  Resp:  [17-18] 17  BP: (100-120)/(60-68) 109/68      Physical Exam  Vitals reviewed.   Constitutional:       Appearance: Normal appearance.   HENT:      Head: Normocephalic and atraumatic.      Right Ear: External ear normal.      Left Ear: External ear normal.      Nose: Nose normal. No congestion or rhinorrhea.      Mouth/Throat:      Mouth: Mucous membranes are moist.      Pharynx: Oropharynx is clear.   Eyes:      General:         Right eye: No discharge.         Left eye: No discharge.      Conjunctiva/sclera: Conjunctivae normal.   Cardiovascular:      Rate and Rhythm: Normal rate and regular rhythm.      Pulses: Normal pulses.      Heart sounds: Normal heart sounds.   Pulmonary:      Effort: Pulmonary effort is normal. No respiratory distress.      Breath sounds: Normal breath sounds. No wheezing or rhonchi.   Abdominal:      General: Bowel sounds are normal. There is no distension.      Palpations: Abdomen is soft.      Tenderness: There is no abdominal tenderness. There is no guarding.   Musculoskeletal:         General: No swelling. Normal range of motion.      Cervical back: Normal range  of motion and neck supple. No tenderness.      Right lower leg: No edema.      Left lower leg: No edema.   Skin:     General: Skin is warm and dry.   Neurological:      Mental Status: She is alert and oriented to person, place, and time.   Psychiatric:         Behavior: Behavior normal.              Discharge Disposition  Home or Self Care    Discharge Medications     Discharge Medications      New Medications      Instructions Start Date   furosemide 40 MG tablet  Commonly known as: LASIX   40 mg, Oral, Daily   Start Date: January 10, 2022     lisinopril 5 MG tablet  Commonly known as: PRINIVIL,ZESTRIL   5 mg, Oral, Every 24 Hours Scheduled   Start Date: January 10, 2022     sildenafil 20 MG tablet  Commonly known as: REVATIO   20 mg, Oral, Every 8 Hours Scheduled         Continue These Medications      Instructions Start Date   Ambien 10 MG tablet  Generic drug: zolpidem   10 mg, Oral, Nightly PRN      ascorbic acid 1000 MG tablet  Commonly known as: VITAMIN C   1,000 mg, Oral, Daily      Biotin 15038 MCG tablet   1,000 mcg, Oral, Daily      Calcium 500 + D 500-125 MG-UNIT tablet  Generic drug: Calcium Carb-Cholecalciferol   1 tablet, Oral, Daily      ibuprofen 200 MG tablet  Commonly known as: ADVIL,MOTRIN   400 mg, Oral, Every 6 Hours PRN      omeprazole 20 MG capsule  Commonly known as: priLOSEC   20 mg, Oral, Daily      PARoxetine 20 MG tablet  Commonly known as: PAXIL   20 mg, Oral, Nightly      simvastatin 20 MG tablet  Commonly known as: ZOCOR   20 mg, Oral, Nightly      vitamin B-12 1000 MCG tablet  Commonly known as: CYANOCOBALAMIN   1,000 mcg, Oral, Daily      vitamin E 400 UNIT capsule   400 Units, Oral, Daily             Discharge Diet: HH    Activity at Discharge: as tolerated    Follow-up Appointments  No future appointments.  Additional Instructions for the Follow-ups that You Need to Schedule     Discharge Follow-up with PCP   As directed       Currently Documented PCP:    Garett Barrientos MD    PCP  Phone Number:    [phone]     Follow Up Details: 2 weeks         Discharge Follow-up with Specified Provider: draw; 2 Weeks   As directed      To: draw    Follow Up: 2 Weeks               Test Results Pending at Discharge  Pending Labs     Order Current Status    ANCA Panel Collected (01/09/22 1349)    Extractable Nuclear Antigen Antibodies Collected (01/09/22 1349)    Scleroderma Diagnostic Profile Collected (01/09/22 1349)    Systemic Lupus Profile A Collected (01/09/22 1349)           LAURENCE Sotomayor  01/09/22  14:14 EST    Time: Discharge 25 min

## 2022-01-09 NOTE — PROGRESS NOTES
Cardiology Progress Note      Admiting Physician:  Olivia Garcia MD   LOS: 3 days       Reason For Followup:  Right heart failure      Subjective:    Interval History:  Seen and examined.  Chart and labs reviewed.  Case discussed with pulmonary medicine.  Patient had CT scan.  She will have PFTs performed as an outpatient.    Review of Systems:  A complete review of systems was undertaken with the pertinent cardiovascular findings listed in history of present illness and all other systems being negative.     Assessment & Plan    Impressions:  Right heart failure  Pulmonary hypertension  Hypertension  Hyperlipidemia    Recommendations:  Pulmonary evaluation  Continue atorvastatin  Continue lisinopril  Cardiology status appropriate for discharge when okay with other services.  Follow-up with cardiology in 4 to 6 weeks.        Objective:    Medication Review:   Scheduled Meds:atorvastatin, 10 mg, Oral, Daily  enoxaparin, 40 mg, Subcutaneous, Q24H  furosemide, 40 mg, Oral, Daily  lisinopril, 5 mg, Oral, Q24H  pantoprazole, 40 mg, Oral, QAM  PARoxetine, 20 mg, Oral, Nightly  sodium chloride, 3 mL, Intravenous, Q12H      Continuous Infusions:   PRN Meds:.•  acetaminophen  •  acetaminophen  •  atropine  •  diphenhydrAMINE  •  hydrALAZINE  •  melatonin  •  nitroglycerin  •  ondansetron **OR** ondansetron  •  ondansetron **OR** ondansetron  •  [COMPLETED] Insert peripheral IV **AND** sodium chloride  •  [COMPLETED] Insert peripheral IV **AND** sodium chloride  •  sodium chloride  •  sodium chloride  •  zolpidem    Patient Active Problem List   Diagnosis   • Stable angina pectoris (HCC)   • Acute systolic congestive heart failure (HCC)   • Pulmonary hypertension (HCC)   • Right ventricular dilation         Physical Exam:    General: Alert, cooperative, no distress, appears stated age  Head:  Normocephalic, atraumatic, mucous membranes moist  Eyes:  Conjunctivae/corneas clear, EOM's intact     Neck:  Supple,  no  bruit  Lungs: Clear to auscultation bilaterally, no wheezes rhonchi rales are noted  Chest wall: No tenderness  Heart::  Regular rate and rhythm, S1 and S2 normal, 1/6 holosystolic murmur.  No rub or gallop  Abdomen: Soft, non-tender, nondistended bowel sounds active  Extremities: No cyanosis, clubbing, or edema  Pulses: 2+ and symmetric all extremities  Skin:  No rashes or lesions  Neuro/psych: A&O x3. CN II through XII are grossly intact with appropriate affect    Vital Signs:  Vitals:    01/08/22 2005 01/09/22 0005 01/09/22 0404 01/09/22 0810   BP: 120/66 114/63 100/60 113/66   BP Location: Right arm Right arm Right arm Right arm   Patient Position: Sitting Sitting Lying Lying   Pulse: 78 87 65 69   Resp: 18 18 18 17   Temp: 97.8 °F (36.6 °C) 98 °F (36.7 °C) 97.8 °F (36.6 °C) 98.3 °F (36.8 °C)   TempSrc: Oral Oral Oral Oral   SpO2: 96% 92% 94% 93%   Weight: 66.3 kg (146 lb 1.6 oz)      Height:         Wt Readings from Last 1 Encounters:   01/08/22 66.3 kg (146 lb 1.6 oz)       Intake/Output Summary (Last 24 hours) at 1/9/2022 0959  Last data filed at 1/8/2022 2005  Gross per 24 hour   Intake 360 ml   Output --   Net 360 ml         Results Review:     CBC    Results from last 7 days   Lab Units 01/07/22 0429 01/06/22 0405 01/05/22  0915   WBC 10*3/mm3 7.90 6.10 6.80   HEMOGLOBIN g/dL 12.7 12.2 11.7*   PLATELETS 10*3/mm3 310 312 301     Cr Clearance Estimated Creatinine Clearance: 69.5 mL/min (by C-G formula based on SCr of 0.8 mg/dL).  Coag   Results from last 7 days   Lab Units 01/07/22 0429 01/06/22  0405   INR  0.98 0.98   APTT seconds  --  26.5     HbA1C No results found for: HGBA1C  Blood Glucose No results found for: POCGLU  Infection     CMP   Results from last 7 days   Lab Units 01/07/22  0429 01/06/22  0405 01/05/22  0915   SODIUM mmol/L 138 142 141   POTASSIUM mmol/L 3.5 3.6 3.5   CHLORIDE mmol/L 101 102 105   CO2 mmol/L 25.0 26.0 23.0   BUN mg/dL 16 11 12   CREATININE mg/dL 0.80 0.69 0.64   GLUCOSE  mg/dL 108* 99 95   ALBUMIN g/dL  --  3.90 3.70   BILIRUBIN mg/dL  --  0.4 0.2   ALK PHOS U/L  --  102 99   AST (SGOT) U/L  --  17 20   ALT (SGPT) U/L  --  26 31     ABG      UA      ANTONIO  No results found for: POCMETH, POCAMPHET, POCBARBITUR, POCBENZO, POCCOCAINE, POCOPIATES, POCOXYCODO, POCPHENCYC, POCPROPOXY, POCTHC, POCTRICYC  Lysis Labs   Results from last 7 days   Lab Units 01/07/22  0429 01/06/22  0405 01/05/22  0915   INR  0.98 0.98  --    APTT seconds  --  26.5  --    HEMOGLOBIN g/dL 12.7 12.2 11.7*   PLATELETS 10*3/mm3 310 312 301   CREATININE mg/dL 0.80 0.69 0.64     Radiology(recent) No radiology results for the last day      Results from last 7 days   Lab Units 01/05/22  0915   TROPONIN T ng/mL <0.010       Imaging Results (Last 24 Hours)     ** No results found for the last 24 hours. **          Cardiac Studies:  Echo- Results for orders placed during the hospital encounter of 01/05/22    Adult Transesophageal Echo (MARKIE) W/ Cont if Necessary Per Protocol    Interpretation Summary  Date of procedure  1/7/2022    Procedure performed  Transesophageal echocardiogram and Doppler study.    Indications  Shortness of breath  Chest discomfort  Significantly enlarged right atrium and right ventricular and significant pulmonary hypertension  Assess for ASD.    Procedure  Anesthesia was provided by anesthesiologist Dr. Petit with intravenous diprivan.  MARKIE probe could be passed without difficulty.  Patient tolerated the procedure well.  No complications were noted.    Results  Technically satisfactory study.  Mitral valve is structurally normal.  Minimal mitral regurgitation is present.  Tricuspid valve is normal.  Significant tricuspid regurgitation is present.  Significant pulmonary hypertension with pulmonary artery pressure of 70 mmHg.  Aortic valve is normal.  Left atrium is enlarged.  Left ventricle is normal in size and contractility with ejection fraction of 60%  Significant right atrium right ventricle  enlargement is present.  Atrial septum is hypermobile but appears to be intact without any PFO.  No pericardial effusion is present.  Aortic intimal thickening is present without clot.    Impression  Significant right atrial and right ventricular enlargement.  Significant pulmonary hypertension with pulmonary artery pressure of 70 mmHg.  Significant tricuspid regurgitation.  Mild mitral regurgitation.  Left atrial enlargement  Atrial septum is hypermobile without any atrial septal defect or PFO.  No pericardial effusion or intracardiac thrombus is present.  Left ventricular size and contractility is normal with ejection fraction of 60%.    Stress Myoview-  Cath-        Lonny Khanna DO  01/09/22  09:59 EST

## 2022-01-09 NOTE — PROGRESS NOTES
LOS: 3 days   Patient Care Team:  Garett Barrientos MD as PCP - General    Subjective     Denies any complaints today      Review of Systems   Constitutional: Positive for activity change. Negative for fatigue.   Respiratory: Negative for cough, chest tightness and shortness of breath.    Cardiovascular: Negative for chest pain and leg swelling.   Gastrointestinal: Negative.  Negative for abdominal pain, constipation, diarrhea, nausea and vomiting.   Genitourinary: Negative.    Neurological: Negative for dizziness, weakness and headaches.           Objective     Vital Signs  Temp:  [97.8 °F (36.6 °C)-98.3 °F (36.8 °C)] 98.2 °F (36.8 °C)  Heart Rate:  [65-87] 71  Resp:  [17-18] 17  BP: (100-120)/(60-68) 109/68      Physical Exam  Constitutional:       Appearance: Normal appearance.   HENT:      Head: Normocephalic and atraumatic.      Right Ear: External ear normal.      Left Ear: External ear normal.      Nose: Nose normal.      Mouth/Throat:      Mouth: Mucous membranes are moist.      Pharynx: Oropharynx is clear.   Eyes:      Conjunctiva/sclera: Conjunctivae normal.   Cardiovascular:      Rate and Rhythm: Normal rate and regular rhythm.      Pulses: Normal pulses.      Heart sounds: Normal heart sounds.   Pulmonary:      Effort: Pulmonary effort is normal.      Breath sounds: Normal breath sounds. No wheezing or rhonchi.   Chest:      Chest wall: No tenderness.   Abdominal:      General: Bowel sounds are normal.      Palpations: Abdomen is soft.      Tenderness: There is no abdominal tenderness. There is no guarding.   Musculoskeletal:         General: No swelling. Normal range of motion.      Cervical back: Normal range of motion and neck supple.      Right lower leg: No edema.      Left lower leg: No edema.   Skin:     General: Skin is warm and dry.   Neurological:      Mental Status: She is alert and oriented to person, place, and time.   Psychiatric:         Behavior: Behavior normal.              Results  Review:    Lab Results (last 24 hours)     ** No results found for the last 24 hours. **           Imaging Results (Last 24 Hours)     ** No results found for the last 24 hours. **               I reviewed the patient's new clinical results.    Medication Review:   Scheduled Meds:atorvastatin, 10 mg, Oral, Daily  enoxaparin, 40 mg, Subcutaneous, Q24H  furosemide, 40 mg, Oral, Daily  lisinopril, 5 mg, Oral, Q24H  pantoprazole, 40 mg, Oral, QAM  PARoxetine, 20 mg, Oral, Nightly  sodium chloride, 3 mL, Intravenous, Q12H      Continuous Infusions:   PRN Meds:.•  acetaminophen  •  acetaminophen  •  atropine  •  diphenhydrAMINE  •  hydrALAZINE  •  melatonin  •  nitroglycerin  •  ondansetron **OR** ondansetron  •  ondansetron **OR** ondansetron  •  [COMPLETED] Insert peripheral IV **AND** sodium chloride  •  [COMPLETED] Insert peripheral IV **AND** sodium chloride  •  sodium chloride  •  sodium chloride  •  zolpidem     Interval History:    Current work up in process for primary pulmonary htn  Pulmonology following will have PFT tomorrow    Assessment/Plan     Stable angina pectoris  Acute systolic congestive heart failure  Pulmonary hypertension  Right ventricular dilation  - Significant right atrial and right ventricular enlargement.  Significant pulmonary hypertension with pulmonary artery pressure of 70 mmHg.  Significant tricuspid regurgitation.  Mild mitral regurgitation.  Left atrial enlargement        Hypertension  -start lisinopril  -hydralazine prn     HDL  -statin     Mood disorder  -continue home medication     Insomnia  -continue home medication     Plan for disposition: Home    Vita Harrell, APRN  01/09/22  12:00 EST

## 2022-01-10 NOTE — OUTREACH NOTE
Prep Survey      Responses   [facility]? [facility]   Is LACE score < 7 ? No   Emergency Room discharge w/ pulse ox? No   Eligibility Readm Mgmt   Discharge diagnosis Stable angina pectoris   Does the patient have one of the following disease processes/diagnoses(primary or secondary)? CHF   Does the patient have Home health ordered? No   Is there a DME ordered? No   Medication alerts for this patient Lasix    General alerts for this patient Heart Cath   Prep survey completed? Yes          Evelina Alonso RN

## 2022-01-10 NOTE — CASE MANAGEMENT/SOCIAL WORK
Case Management Discharge Note      Final Note: Home    Provided Post Acute Provider List?: Refused    Selected Continued Care - Discharged on 1/9/2022 Admission date: 1/5/2022 - Discharge disposition: Home or Self Care     Final Discharge Disposition Code: 01 - home or self-care

## 2022-01-13 NOTE — OUTREACH NOTE
CHF Week 1 Survey      Responses   [facility] patient discharged from? [facility]   Does the patient have one of the following disease processes/diagnoses(primary or secondary)? CHF   CHF Week 1 attempt successful? Yes   Call start time 1012   Call end time 1022   Meds reviewed with patient/caregiver? Yes   Is the patient having any side effects they believe may be caused by any medication additions or changes? No   Does the patient have all medications ordered at discharge? Yes   Is the patient taking all medications as directed (includes completed medication regime)? Yes   Does the patient have a primary care provider?  Yes   Comments regarding PCP says she is calling to make a f/u appt with PCP   Has the patient kept scheduled appointments due by today? N/A   Has home health visited the patient within 72 hours of discharge? N/A   Psychosocial issues? No   Did the patient receive a copy of their discharge instructions? Yes   Nursing interventions Reviewed instructions with patient   What is the patient's perception of their health status since discharge? Improving   Nursing interventions Nurse provided patient education   Is the patient able to teach back signs and symptoms of worsening condition? (i.e. weight gain, shortness of air, etc.) Yes   Is the patient/caregiver able to teach back the hierarchy of who to call/visit for symptoms/problems? PCP, Specialist, Home health nurse, Urgent Care, ED, 911 Yes    CHF Week 1 call completed? Yes   Wrap up additional comments Doing well, no questions or concerns at this time.          Deisy Hanley RN

## 2022-01-20 NOTE — OUTREACH NOTE
CHF Week 2 Survey      Responses   [facility] patient discharged from? [facility]   Does the patient have one of the following disease processes/diagnoses(primary or secondary)? CHF   Week 2 attempt successful? Yes   Call start time 1334   Call end time 1337   General alerts for this patient Heart Cath   Discharge diagnosis Stable angina pectoris   Meds reviewed with patient/caregiver? Yes   Is the patient taking all medications as directed (includes completed medication regime)? Yes   Comments regarding appointments Lung appt next week   Does the patient have a primary care provider?  Yes   Comments regarding PCP completed    Has the patient kept scheduled appointments due by today? Yes   Has home health visited the patient within 72 hours of discharge? N/A   Pulse Ox monitoring None   Psychosocial issues? No   Did the patient receive a copy of their discharge instructions? Yes   Nursing interventions Reviewed instructions with patient   What is the patient's perception of their health status since discharge? Improving   Is the patient weighing daily? Yes   Does the patient have scales? Yes   Daily weight interventions Education provided on importance of daily weight   Is the patient able to teach back Heart Failure diet management? Yes   Is the patient able to teach back Heart Failure Zones? Yes   Is the patient able to teach back signs and symptoms of worsening condition? (i.e. weight gain, shortness of air, etc.) Yes   Is the patient/caregiver able to teach back the hierarchy of who to call/visit for symptoms/problems? PCP, Specialist, Home health nurse, Urgent Care, ED, 911 Yes   CHF Week 2 call completed? Yes   Wrap up additional comments Pt reports she is doing well. Reviewed S/S of CHF and discussed wts and when to call her Dr. Cayla Jarvis RN

## 2022-01-27 NOTE — OUTREACH NOTE
CHF Week 3 Survey      Responses   [facility] patient discharged from? [facility]   Does the patient have one of the following disease processes/diagnoses(primary or secondary)? CHF   Week 3 attempt successful? Yes   Call start time 1427   Call end time 1430   General alerts for this patient Heart Cath   Discharge diagnosis Stable angina pectoris   Meds reviewed with patient/caregiver? Yes   Does the patient have all medications ordered at discharge? Yes   Is the patient taking all medications as directed (includes completed medication regime)? Yes   Comments regarding appointments Pulmonologist appt rescheduled for next week   Does the patient have a primary care provider?  Yes   Does the patient have an appointment with their PCP within 7 days of discharge? Yes   Has the patient kept scheduled appointments due by today? Yes   Pulse Ox monitoring None   Psychosocial issues? No   Did the patient receive a copy of their discharge instructions? Yes   Nursing interventions Reviewed instructions with patient   What is the patient's perception of their health status since discharge? Returned to baseline/stable  [No edema, no CP, slight SOA with exertion which is normal for her]   Nursing interventions Nurse provided patient education   Is the patient weighing daily? Yes   Does the patient have scales? Yes   Daily weight interventions Education provided on importance of daily weight  [Reviewed weight gain parameters of 3# day/5 # week require MD notification-encouraged to weigh daily and record to take to MD appts for review---v/u]   Is the patient able to teach back Heart Failure diet management? Yes   Is the patient able to teach back Heart Failure Zones? Yes   CHF Week 3 call completed? Yes   Wrap up additional comments Pt doing well---no questions or concerns today          Chuyita Godwin RN

## 2022-02-02 NOTE — PROGRESS NOTES
PULMONARY/ CRITICAL CARE/ SLEEP MEDICINE OUTPATIENT CONSULT/ FOLLOW UP NOTE        Patient Name:  Natalie Yin    :  1950    Medical Record:  [id number]    PRIMARY CARE PHYSICIAN     Garett Barrientos MD    REASON FOR CONSULTATION    Natalie Yin is a 71 y.o. female who is referred for consultation for pulmonary hypertension  REVIEW OF SYSTEMS    Constitutional:  Denies fever or chills   Eyes:  Denies change in visual acuity   HENT:  Denies nasal congestion or sore throat   Respiratory:  Denies cough or shortness of breath   Cardiovascular:  Denies chest pain or edema   GI:  Denies abdominal pain, nausea, vomiting, bloody stools or diarrhea   :  Denies dysuria   Musculoskeletal:  Denies back pain or joint pain   Integument:  Denies rash   Neurologic:  Denies headache, focal weakness or sensory changes   Endocrine:  Denies polyuria or polydipsia   Lymphatic:  Denies swollen glands   Psychiatric:  Denies depression or anxiety     MEDICAL HISTORY    Past Medical History:   Diagnosis Date   • Acute systolic congestive heart failure (HCC) 2022    Added automatically from request for surgery [id number]   • Anxiety and depression    • Arthritis    • DDD (degenerative disc disease), lumbar    • Diverticulosis    • GERD (gastroesophageal reflux disease)    • Hyperlipidemia    • PONV (postoperative nausea and vomiting)    • Pulmonary hypertension (HCC) 2022    Added automatically from request for surgery [id number]   • Right ventricular dilation 2022    Added automatically from request for surgery [id number]        SURGICAL HISTORY    Past Surgical History:   Procedure Laterality Date   • BLEPHAROPLASTY Bilateral 3/2/2021    Procedure: BILATERAL UPPER LID BLEPHAROPLASY;  Surgeon: Stanley Fulton MD;  [place] [facility] [place] [facility] [place]  Service: Ophthalmology;  Laterality: Bilateral;   • BROW LIFT Bilateral 2021    Procedure: BILATERAL TEMPORAL DIRECT BROW LIFT, LEFT UPPER LID MULLERECTOMY;  Surgeon: Stanley Fulton  MD Km;  Location:  [facility];  Service: Ophthalmology;  Laterality: Bilateral;   • BUNIONECTOMY Right    • CARDIAC CATHETERIZATION N/A 1/7/2022    Procedure: Left Heart Cath and coronary angiogram;  Surgeon: Chase Paige MD;  Location:  [facility];  Service: Cardiovascular;  Laterality: N/A;   • CARDIAC CATHETERIZATION N/A 1/7/2022    Procedure: Right Heart Cath;  Surgeon: Chase Paige MD;  Location:  [facility];  Service: Cardiovascular;  Laterality: N/A;   • COLONOSCOPY     • ENDOSCOPY     • FACIAL COSMETIC SURGERY  06/2021   • HYSTERECTOMY  1990'S   • KNEE ARTHROPLASTY Bilateral 2013 2017   • LUMBAR DISC SURGERY  1990'S   • TONSILLECTOMY          FAMILY HISTORY    Family History   Problem Relation Age of Onset   • Heart disease Mother    • Cancer Father    • Malig Hyperthermia Neg Hx        SOCIAL HISTORY    Social History     Tobacco Use   • Smoking status: Never Smoker   • Smokeless tobacco: Never Used   Substance Use Topics   • Alcohol use: Yes     Comment: SOCIAL USE        ALLERGIES    No Known Allergies      MEDICATIONS    Current Outpatient Medications on File Prior to Visit   Medication Sig Dispense Refill   • ascorbic acid (VITAMIN C) 1000 MG tablet Take 1,000 mg by mouth Daily.     • Biotin 64678 MCG tablet Take 1,000 mcg by mouth Daily.     • Calcium Carb-Cholecalciferol (Calcium 500 + D) 500-125 MG-UNIT tablet Take 1 tablet by mouth Daily.     • furosemide (LASIX) 40 MG tablet Take 1 tablet by mouth Daily. 30 tablet 0   • ibuprofen (ADVIL,MOTRIN) 200 MG tablet Take 400 mg by mouth Every 6 (Six) Hours As Needed for Mild Pain .     • lisinopril (PRINIVIL,ZESTRIL) 5 MG tablet Take 1 tablet by mouth Daily. 30 tablet 0   • omeprazole (priLOSEC) 20 MG capsule Take 20 mg by mouth Daily.     • PARoxetine (PAXIL) 20 MG tablet Take 20 mg by mouth Every Night.     • sildenafil (REVATIO) 20 MG tablet Take 1 tablet by mouth Every 8 (Eight) Hours. 90 tablet 0  "  • simvastatin (ZOCOR) 20 MG tablet Take 20 mg by mouth Every Night.     • Upneeq 0.1 % solution Administer 1 drop into the left eye 2 (Two) Times a Day.     • vitamin B-12 (CYANOCOBALAMIN) 1000 MCG tablet Take 1,000 mcg by mouth Daily.     • vitamin E 400 UNIT capsule Take 400 Units by mouth Daily.     • zolpidem (Ambien) 10 MG tablet Take 10 mg by mouth At Night As Needed for Sleep.       No current facility-administered medications on file prior to visit.       PHYSICAL EXAM    Vitals:    02/02/22 1346   BP: 122/67   BP Location: Left arm   Patient Position: Sitting   Cuff Size: Adult   Pulse: 86   Resp: 12   SpO2: 97%   Weight: 66.2 kg (146 lb)   Height: 165.1 cm (65\")        Constitutional:  Well developed, well nourished, no acute distress, non-toxic appearance   Eyes:  PERRL, conjunctiva normal   HENT:  Atraumatic, external ears normal, nose normal, oropharynx moist, no pharyngeal exudates. mallampatti   Neck- normal range of motion, no tenderness, supple   Respiratory:  No respiratory distress, normal breath sounds, no rales, no wheezing   Cardiovascular:  Normal rate, normal rhythm, no murmurs, no gallops, no rubs   GI:  Soft, nondistended, normal bowel sounds, nontender, no organomegaly, no mass, no rebound, no guarding   :  No costovertebral angle tenderness   Musculoskeletal:  No edema, no tenderness, no deformities. Back- no tenderness  Integument:  Well hydrated, no rash   Lymphatic:  No lymphadenopathy noted   Neurologic:  Alert & oriented x 3, CN 2-12 normal, normal motor function, normal sensory function, no focal deficits noted   Psychiatric:  Speech and behavior appropriate     CT Chest Without Contrast Diagnostic    Result Date: 1/9/2022   1. Chronic disease as described. 2. No active pulmonary disease. 3. Trace pericardial effusion.  Electronically Signed By-Masoud Sands MD On:1/9/2022 4:58 PM This report was finalized on [id number] by  Masoud Sands MD.    XR Chest 1 View    Result Date: " 1/5/2022   1. Heart size appears larger than on the 2016 comparison. This could be accentuated by the portable technique. 2. Otherwise, no acute chest findings.  Electronically Signed By-Radha Eddy MD On:1/5/2022 9:47 AM This report was finalized on [id number] by  Radha Eddy MD.     Results for orders placed during the hospital encounter of 01/05/22    Adult Transesophageal Echo (MARKIE) W/ Cont if Necessary Per Protocol    Interpretation Summary  Date of procedure  1/7/2022    Procedure performed  Transesophageal echocardiogram and Doppler study.    Indications  Shortness of breath  Chest discomfort  Significantly enlarged right atrium and right ventricular and significant pulmonary hypertension  Assess for ASD.    Procedure  Anesthesia was provided by anesthesiologist Dr. Petit with intravenous diprivan.  MARKIE probe could be passed without difficulty.  Patient tolerated the procedure well.  No complications were noted.    Results  Technically satisfactory study.  Mitral valve is structurally normal.  Minimal mitral regurgitation is present.  Tricuspid valve is normal.  Significant tricuspid regurgitation is present.  Significant pulmonary hypertension with pulmonary artery pressure of 70 mmHg.  Aortic valve is normal.  Left atrium is enlarged.  Left ventricle is normal in size and contractility with ejection fraction of 60%  Significant right atrium right ventricle enlargement is present.  Atrial septum is hypermobile but appears to be intact without any PFO.  No pericardial effusion is present.  Aortic intimal thickening is present without clot.    Impression  Significant right atrial and right ventricular enlargement.  Significant pulmonary hypertension with pulmonary artery pressure of 70 mmHg.  Significant tricuspid regurgitation.  Mild mitral regurgitation.  Left atrial enlargement  Atrial septum is hypermobile without any atrial septal defect or PFO.  No pericardial effusion or intracardiac thrombus  is present.  Left ventricular size and contractility is normal with ejection fraction of 60%.            Assessment:     Pulmonary hypertension status post MARKIE 1/7/2021  Significant right atrial and right ventricular enlargement.  Significant pulmonary hypertension with pulmonary artery pressure of 70 mmHg.  Significant tricuspid regurgitation.  Mild mitral regurgitation.  Left atrial enlargement  Atrial septum is hypermobile without any atrial septal defect or PFO.  No pericardial effusion or intracardiac thrombus is present.  Left ventricular size and contractility is normal with ejection fraction of 60%.    Work-up for connective tissue disease  Showed positive REMEDIOS very high RA and positive REMEDIOS and NORBERT SSA, antiscleroderma antibodies were negative  ANCA was negative    Patient had a skin biopsy by Dr. 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

## 2022-03-16 NOTE — PROGRESS NOTES
Subjective   Natalie Yin is a 72 y.o. female.     History of Present Illness   Follow-up pulmonary hypertension.  Follow-up on test results  Overall feeling better although she still have some dyspnea on exertion, no significant cough    Patient Active Problem List   Diagnosis   • Stable angina pectoris (HCC)   • Acute systolic congestive heart failure (HCC)   • Pulmonary hypertension (HCC)   • Right ventricular dilation     Current Outpatient Medications on File Prior to Visit   Medication Sig Dispense Refill   • ascorbic acid (VITAMIN C) 1000 MG tablet Take 1,000 mg by mouth Daily.     • Biotin 47568 MCG tablet Take 1,000 mcg by mouth Daily.     • Calcium Carb-Cholecalciferol 500-125 MG-UNIT tablet Take 1 tablet by mouth Daily.     • furosemide (LASIX) 40 MG tablet Take 1 tablet by mouth Daily. 30 tablet 0   • ibuprofen (ADVIL,MOTRIN) 200 MG tablet Take 400 mg by mouth Every 6 (Six) Hours As Needed for Mild Pain .     • lisinopril (PRINIVIL,ZESTRIL) 5 MG tablet Take 1 tablet by mouth Daily. 30 tablet 0   • omeprazole (priLOSEC) 20 MG capsule Take 20 mg by mouth Daily.     • PARoxetine (PAXIL) 20 MG tablet Take 20 mg by mouth Every Night.     • sildenafil (REVATIO) 20 MG tablet Take 1 tablet by mouth Every 8 (Eight) Hours. 90 tablet 0   • simvastatin (ZOCOR) 20 MG tablet Take 20 mg by mouth Every Night.     • Upneeq 0.1 % solution Administer 1 drop into the left eye 2 (Two) Times a Day.     • vitamin B-12 (CYANOCOBALAMIN) 1000 MCG tablet Take 1,000 mcg by mouth Daily.     • vitamin E 400 UNIT capsule Take 400 Units by mouth Daily.     • zolpidem (AMBIEN) 10 MG tablet Take 10 mg by mouth At Night As Needed for Sleep.       No current facility-administered medications on file prior to visit.           The following portions of the patient's history were reviewed and updated as appropriate: allergies, current medications, past family history, past medical history, past social history, past surgical history and  problem list.    Review of Systems   Constitutional: Negative for chills, fever and malaise/fatigue.   HENT: Negative.    Eyes: Negative.    Cardiovascular: Negative.    Respiratory: Positive for  shortness of breath.    Skin: Scleroderma  Musculoskeletal: Negative.    Gastrointestinal: Swallowing improved after dilation  Genitourinary: Negative.    Neurological: Negative.    Psychiatric/Behavioral: Negative.    Objective   Physical Exam  General Appearance:  Alert   HEENT:  Normocephalic, without obvious abnormality, Conjunctiva/corneas clear,.  Normal external ear canals, Nares normal, no drainage     Neck:  Supple, symmetrical, trachea midline. No JVD.  Lungs /Chest wall:   Bilateral basal rhonchi, respirations unlabored, symmetrical wall movement.     Heart:  Regular rate and rhythm, S1 S2 normal  Abdomen: Soft, non-tender, no masses, no organomegaly.    Extremities: No edema, no clubbing or cyanosis  CT Chest Without Contrast Diagnostic     Result Date: 1/9/2022   1. Chronic disease as described. 2. No active pulmonary disease. 3. Trace pericardial effusion.  Electronically Signed By-Masoud Sands MD On:1/9/2022 4:58 PM This report was finalized on [id number] by  Masoud Sands MD.     XR Chest 1 View     Result Date: 1/5/2022   1. Heart size appears larger than on the 2016 comparison. This could be accentuated by the portable technique. 2. Otherwise, no acute chest findings.  Electronically Signed By-Radha Eddy MD On:1/5/2022 9:47 AM This report was finalized on [id number] by  Radha Eddy MD.     Results for orders placed during the hospital encounter of 01/05/22     Adult Transesophageal Echo (MARKIE) W/ Cont if Necessary Per Protocol     Interpretation Summary  Date of procedure  1/7/2022     Procedure performed  Transesophageal echocardiogram and Doppler study.     Indications  Shortness of breath  Chest discomfort  Significantly enlarged right atrium and right ventricular and significant pulmonary  hypertension  Assess for ASD.     Procedure  Anesthesia was provided by anesthesiologist Dr. Petit with intravenous diprivan.  MARKIE probe could be passed without difficulty.  Patient tolerated the procedure well.  No complications were noted.     Results  Technically satisfactory study.  Mitral valve is structurally normal.  Minimal mitral regurgitation is present.  Tricuspid valve is normal.  Significant tricuspid regurgitation is present.  Significant pulmonary hypertension with pulmonary artery pressure of 70 mmHg.  Aortic valve is normal.  Left atrium is enlarged.  Left ventricle is normal in size and contractility with ejection fraction of 60%  Significant right atrium right ventricle enlargement is present.  Atrial septum is hypermobile but appears to be intact without any PFO.  No pericardial effusion is present.  Aortic intimal thickening is present without clot.     Impression  Significant right atrial and right ventricular enlargement.  Significant pulmonary hypertension with pulmonary artery pressure of 70 mmHg.  Significant tricuspid regurgitation.  Mild mitral regurgitation.  Left atrial enlargement  Atrial septum is hypermobile without any atrial septal defect or PFO.  No pericardial effusion or intracardiac thrombus is present.  Left ventricular size and contractility is normal with ejection fraction of 60%.      03/14/22 PFTs  Spirometry  Spirometry demonstrates no airway obstruction.     Post Bronchodilator  Following the inhalation of a bronchodilator, there is no significant change in airway obstruction. This does not necessrily mean that chronic bronchodilator therapy may not be useful.     MVV  The maximum voluntary ventilation is normal.     Lung Volume  Lung volumes are normal.     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  Significant pulmonary hypertension with pulmonary artery pressure of 70 mmHg.  Significant tricuspid regurgitation.  Mild mitral regurgitation.  Left atrial enlargement  Atrial septum is hypermobile without any atrial septal defect or PFO.  No pericardial effusion or intracardiac thrombus is present.  Left ventricular size and contractility is normal with ejection fraction of 60%.     Work-up for connective tissue disease  Showed positive REMEDIOS very high RA and positive REMEDIOS and NORBERT SSA, antiscleroderma antibodies were negative  ANCA was negative     Patient had a skin biopsy by Dr. Martinez and told she has skin scleroderma     Follow-up with rheumatology        Recommendations:     Continue Revatio 20 mg TID patient feels better  Start bosentan/Tracleer 62.5 mg twice daily for 4 weeks and then 125 mg twice daily  Check LFTs monthly, discussed with the patient benefits and risks and side effects  Referral to rheumatology          We will perform home sleep study to rule out obstructive sleep apnea as essential work-up for pulmonary hypertension

## 2022-08-04 PROBLEM — F41.9 ANXIETY DISORDER: Status: ACTIVE | Noted: 2022-01-01

## 2022-08-04 PROBLEM — E78.5 HYPERLIPIDEMIA: Status: ACTIVE | Noted: 2022-01-01

## 2022-08-04 PROBLEM — Z96.652 PRESENCE OF LEFT ARTIFICIAL KNEE JOINT: Status: ACTIVE | Noted: 2017-01-17

## 2022-08-04 NOTE — PROGRESS NOTES
Subjective   Natalie Yin is a 72 y.o. female.     History of Present Illness   Here for follow-up on pulmonary hypertension.  Still having shortness of breath on exertion but has not started bosentan yet  Her symptoms are still evident upon walking 200 to 300 feet.  No syncope or chest pain    Past Medical History:   Diagnosis Date   • Acute systolic congestive heart failure (HCC) 1/5/2022    Added automatically from request for surgery [id number]   • Anxiety and depression    • Arthritis    • DDD (degenerative disc disease), lumbar    • Diverticulosis    • GERD (gastroesophageal reflux disease)    • Hyperlipidemia    • PONV (postoperative nausea and vomiting)    • Pulmonary hypertension (HCC) 1/5/2022    Added automatically from request for surgery [id number]   • Right ventricular dilation 1/5/2022    Added automatically from request for surgery [id number]     Current Outpatient Medications on File Prior to Visit   Medication Sig Dispense Refill   • ascorbic acid (VITAMIN C) 1000 MG tablet Take 1,000 mg by mouth Daily.     • Biotin 79045 MCG tablet Take 1,000 mcg by mouth Daily.     • Calcium Carb-Cholecalciferol 500-125 MG-UNIT tablet Take 1 tablet by mouth Daily.     • furosemide (LASIX) 40 MG tablet Take 1 tablet by mouth Daily. 30 tablet 0   • ibuprofen (ADVIL,MOTRIN) 200 MG tablet Take 400 mg by mouth Every 6 (Six) Hours As Needed for Mild Pain .     • lisinopril (PRINIVIL,ZESTRIL) 5 MG tablet Take 1 tablet by mouth Daily. 30 tablet 0   • omeprazole (priLOSEC) 20 MG capsule Take 20 mg by mouth Daily.     • PARoxetine (PAXIL) 20 MG tablet Take 20 mg by mouth Every Night.     • sildenafil (REVATIO) 20 MG tablet Take 1 tablet by mouth Every 8 (Eight) Hours. 90 tablet 5   • simvastatin (ZOCOR) 20 MG tablet Take 20 mg by mouth Every Night.     • vitamin E 400 UNIT capsule Take 400 Units by mouth Daily.     • zolpidem (AMBIEN) 10 MG tablet Take 10 mg by mouth At Night As Needed for Sleep.     • [DISCONTINUED] bosentan  "(TRACLEER) 62.5 MG tablet Take 1 tablet by mouth 2 (Two) Times a Day for 28 days, THEN 2 tablets 2 (Two) Times a Day for 28 days. 60 tablet 6   • [DISCONTINUED] Upneeq 0.1 % solution Administer 1 drop into the left eye 2 (Two) Times a Day.     • [DISCONTINUED] vitamin B-12 (CYANOCOBALAMIN) 1000 MCG tablet Take 1,000 mcg by mouth Daily.       No current facility-administered medications on file prior to visit.     Social History     Tobacco Use   • Smoking status: Never Smoker   • Smokeless tobacco: Never Used   Vaping Use   • Vaping Use: Never used   Substance Use Topics   • Alcohol use: Yes     Comment: SOCIAL USE   • Drug use: Never     Family History   Problem Relation Age of Onset   • Heart disease Mother    • Cancer Father    • Malig Hyperthermia Neg Hx        The following portions of the patient's history were reviewed and updated as appropriate: allergies, current medications, past family history, past medical history, past social history, past surgical history and problem list.    Review of Systems  Constitutional: Negative for chills, fever and malaise/fatigue.   HENT: Negative.    Eyes: Negative.    Cardiovascular: Negative.    Respiratory: Positive for cough and shortness of breath.    Skin: Negative.    Musculoskeletal: Negative.    Gastrointestinal: Negative.    Genitourinary: Negative.    Neurological: Negative.    Psychiatric/Behavioral: Negative.  Objective   Physical Exam  Blood pressure 121/71, pulse 69, resp. rate 16, height 165.1 cm (65\"), weight 65.8 kg (145 lb), SpO2 97 %.      Physical Exam  General Appearance:  Alert   HEENT:  Normocephalic, without obvious abnormality, Conjunctiva/corneas clear,.  Normal external ear canals, Nares normal, no drainage     Neck:  Supple, symmetrical, trachea midline. No JVD.  Lungs /Chest wall:   Bilateral basal rhonchi, respirations unlabored, symmetrical wall movement.     Heart:  Regular rate and rhythm, S1 S2 normal  Abdomen: Soft, non-tender, no masses, " no organomegaly.    Extremities: No edema, no clubbing or cyanosis  CT Chest Without Contrast Diagnostic     Result Date: 1/9/2022   1. Chronic disease as described. 2. No active pulmonary disease. 3. Trace pericardial effusion.  Electronically Signed By-Masoud Sands MD On:1/9/2022 4:58 PM This report was finalized on [id number] by  Masoud Sands MD.     XR Chest 1 View     Result Date: 1/5/2022   1. Heart size appears larger than on the 2016 comparison. This could be accentuated by the portable technique. 2. Otherwise, no acute chest findings.  Electronically Signed By-Radha Eddy MD On:1/5/2022 9:47 AM This report was finalized on [id number] by  Radha Eddy MD.     Results for orders placed during the hospital encounter of 01/05/22     Adult Transesophageal Echo (MARKIE) W/ Cont if Necessary Per Protocol     Interpretation Summary  Date of procedure  1/7/2022     Procedure performed  Transesophageal echocardiogram and Doppler study.     Indications  Shortness of breath  Chest discomfort  Significantly enlarged right atrium and right ventricular and significant pulmonary hypertension  Assess for ASD.     Procedure  Anesthesia was provided by anesthesiologist Dr. Petit with intravenous diprivan.  MARKIE probe could be passed without difficulty.  Patient tolerated the procedure well.  No complications were noted.     Results  Technically satisfactory study.  Mitral valve is structurally normal.  Minimal mitral regurgitation is present.  Tricuspid valve is normal.  Significant tricuspid regurgitation is present.  Significant pulmonary hypertension with pulmonary artery pressure of 70 mmHg.  Aortic valve is normal.  Left atrium is enlarged.  Left ventricle is normal in size and contractility with ejection fraction of 60%  Significant right atrium right ventricle enlargement is present.  Atrial septum is hypermobile but appears to be intact without any PFO.  No pericardial effusion is present.  Aortic intimal  thickening is present without clot.     Impression  Significant right atrial and right ventricular enlargement.  Significant pulmonary hypertension with pulmonary artery pressure of 70 mmHg.  Significant tricuspid regurgitation.  Mild mitral regurgitation.  Left atrial enlargement  Atrial septum is hypermobile without any atrial septal defect or PFO.  No pericardial effusion or intracardiac thrombus is present.  Left ventricular size and contractility is normal with ejection fraction of 60%.      03/14/22 PFTs  Spirometry  Spirometry demonstrates no airway obstruction.     Post Bronchodilator  Following the inhalation of a bronchodilator, there is no significant change in airway obstruction. This does not necessrily mean that chronic bronchodilator therapy may not be useful.     MVV  The maximum voluntary ventilation is normal.     Lung Volume  Lung volumes are normal.     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  Significant pulmonary hypertension with pulmonary artery pressure of 70 mmHg.  Significant tricuspid regurgitation.  Mild mitral regurgitation.  Left atrial enlargement  Atrial septum is hypermobile without any atrial septal defect or PFO.  No pericardial effusion or intracardiac thrombus is present.  Left ventricular size and contractility is normal with ejection fraction of 60%.     Work-up for connective tissue disease  Showed positive REMEDIOS very high RA and positive REMEDIOS and NORBERT SSA, antiscleroderma antibodies were negative  ANCA was negative     Patient had a skin biopsy by Dr. Martinez and told she has skin scleroderma     Follow-up with rheumatology        Plan     Continue Revatio 20 mg TID patient feels partially better  Ordered bosentan/Tracleer 62.5 mg twice daily for 4 weeks and  then 125 mg twice daily but have not received the prior authorization yet  Check LFTs monthly once she start taking the medication, discussed with the patient benefits and risks and side effects  For the future management if symptoms progress despite being on bosentan would consider switching [place] you to treprostinil  Patient will follow-up with rheumatology for starting immunosuppressant           We will perform home sleep study to rule out obstructive sleep apnea as essential work-up for pulmonary hypertension

## 2022-08-05 PROBLEM — I27.29 PULMONARY HYPERTENSION SECONDARY TO SCLERODERMA (HCC): Status: ACTIVE | Noted: 2022-01-01

## 2022-08-05 PROBLEM — M34.9 PULMONARY HYPERTENSION SECONDARY TO SCLERODERMA (HCC): Status: ACTIVE | Noted: 2022-01-01

## 2022-09-15 PROBLEM — I46.9 CARDIOPULMONARY ARREST WITH SUCCESSFUL RESUSCITATION (HCC): Status: ACTIVE | Noted: 2022-01-01

## 2022-09-15 PROBLEM — R07.9 CHEST PAIN, UNSPECIFIED TYPE: Status: ACTIVE | Noted: 2022-01-01

## 2022-09-15 NOTE — H&P
Patient Care Team:  Garett Barrientos MD as PCP - General  :   Chief complaint  At the time of my visit she is with no chest pain . She is soa which is chronic and worse as of late.  She has a HX of Pulmonary HTN ,  scleroderma , HLD abd gerd.     Subjective     Patient is a 72 y.o. female presents with chest pain . Onset of symptoms was yesterday .  ER found cxr with cardiomegaly and mild pulmonary congestion. EKG with no ischemia and negative troponin. BNP was elevated at 7000 and D dimer was up at 1.7. CT PE protocol with no PE . She had a cardiac cath in Jan 2022 with normal coronary arteries. She has been admitted for further management.     Review of Systems   Constitutional: Positive for activity change and fatigue.   HENT: Negative for trouble swallowing.    Eyes: Negative for visual disturbance.   Respiratory: Positive for cough and shortness of breath.    Cardiovascular: Positive for chest pain. Negative for palpitations and leg swelling.   Gastrointestinal: Negative for abdominal pain, diarrhea, nausea and vomiting.   Genitourinary: Negative for difficulty urinating.   Musculoskeletal: Negative for gait problem.   Psychiatric/Behavioral: Negative for confusion.          History  Past Medical History:   Diagnosis Date   • Acute systolic congestive heart failure (HCC) 1/5/2022    Added automatically from request for surgery [id number]   • Anxiety and depression    • Arthritis    • DDD (degenerative disc disease), lumbar    • Diverticulosis    • GERD (gastroesophageal reflux disease)    • Hyperlipidemia    • PONV (postoperative nausea and vomiting)    • Pulmonary hypertension (HCC) 1/5/2022    Added automatically from request for surgery [id number]   • Pulmonary hypertension secondary to scleroderma (HCC) 8/5/2022   • Right ventricular dilation 1/5/2022    Added automatically from request for surgery [id number]     Past Surgical History:   Procedure Laterality Date   • BLEPHAROPLASTY Bilateral 3/2/2021    Procedure:  BILATERAL UPPER LID BLEPHAROPLASY;  Surgeon: Stanley Fulton MD;  Location:  [facility];  Service: Ophthalmology;  Laterality: Bilateral;   • BROW LIFT Bilateral 9/23/2021    Procedure: BILATERAL TEMPORAL DIRECT BROW LIFT, LEFT UPPER LID MULLERECTOMY;  Surgeon: Stanley Fulton MD;  Location:  [facility];  Service: Ophthalmology;  Laterality: Bilateral;   • BUNIONECTOMY Right    • CARDIAC CATHETERIZATION N/A 1/7/2022    Procedure: Left Heart Cath and coronary angiogram;  Surgeon: Chase Paige MD;  Location:  [facility];  Service: Cardiovascular;  Laterality: N/A;   • CARDIAC CATHETERIZATION N/A 1/7/2022    Procedure: Right Heart Cath;  Surgeon: Chase Paige MD;  Location:  [facility];  Service: Cardiovascular;  Laterality: N/A;   • COLONOSCOPY     • ENDOSCOPY     • FACIAL COSMETIC SURGERY  06/2021   • HYSTERECTOMY  1990'S   • KNEE ARTHROPLASTY Bilateral 2013 2017   • LUMBAR DISC SURGERY  1990'S   • TONSILLECTOMY       Family History   Problem Relation Age of Onset   • Heart disease Mother    • Cancer Father    • Malig Hyperthermia Neg Hx      Social History     Tobacco Use   • Smoking status: Never Smoker   • Smokeless tobacco: Never Used   Vaping Use   • Vaping Use: Never used   Substance Use Topics   • Alcohol use: Yes     Comment: SOCIAL USE   • Drug use: Never     (Not in a hospital admission)    Allergies:  Patient has no known allergies.    Objective     Vital Signs  Temp:  [97.7 °F (36.5 °C)] 97.7 °F (36.5 °C)  Heart Rate:  [] 101  Resp:  [17-22] 17  BP: (101-141)/() 141/116     Physical Exam:      General Appearance:    Alert, cooperative, in no acute distress   Head:    Normocephalic, without obvious abnormality, atraumatic   Eyes:            Lids and lashes normal, conjunctivae and sclerae normal, no   icterus, no pallor, corneas clear, PERRLA   Ears:    Ears appear intact with no abnormalities noted   Throat:   No oral lesions, no  thrush, oral mucosa moist   Neck:   No adenopathy, supple, trachea midline, no thyromegaly, no   carotid bruit, no JVD   Lungs:      Diminished respirations throughout regular, even and                  unlabored    Heart:    Regular rhythm and normal rate, normal S1 and S2, apparent MG , no gallop, no rub, no click   Chest Wall:    No abnormalities observed   Abdomen:     Normal bowel sounds, no masses, no organomegaly, soft        non-tender, non-distended, no guarding, no rebound                tenderness   Extremities:   Moves all extremities well, no edema, no cyanosis, no             redness   Pulses:   Pulses palpable and equal bilaterally   Skin:   No bleeding, bruising or rash   Lymph nodes:   No palpable adenopathy   Neurologic:   Cranial nerves 2 - 12 grossly intact, sensation intact, DTR       present and equal bilaterally       Results Review:     Imaging Results (Last 24 Hours)     Procedure Component Value Units Date/Time    CT Angiogram Chest Pulmonary Embolism [[id number]] Collected: 09/15/22 0747     Updated: 09/15/22 0800    Narrative:      CT ANGIOGRAM CHEST PULMONARY EMBOLISM-     Date of Exam: 9/15/2022 6:39 AM     Indication: chest pain, elevated d-dimer.     Comparison: 1/9/2022     Technique: Serial and axial CT images of the chest were obtained  following the uneventful intravenous administration of 100 mL Isovue-370  contrast. Reconstructions in the coronal and sagittal planes were also  performed. In addition, a 3 D volume rendered image was obtained after  post processing. Automated exposure control and iterative reconstruction  methods were used.       FINDINGS:     Pulmonary Arteries: Evaluation of pulmonary arteries is slightly limited  by motion. There is an adequate bolus.     Motion limitation is more prominent at the lung bases with limited  evaluation of the segmental and subsegmental levels. No evidence of a  central pulmonary sinuses noted. Main pulmonary arteries normal  caliber.     Mediastinum: Heart size is mildly enlarged. This is accentuated by low  lung volumes. No significant pericardial effusion noted.     Mildly motion limited imaging of the aorta and origin of great vessels  demonstrate no acute abnormality. Mild underlying atherosclerotic  changes are noted. Minimal hilar adenopathy bilaterally likely reactive  noted.     Some small partially calcified subcarinal lymph nodes are noted.     Limited imaging of the base of the neck, axilla and the esophagus are  grossly unremarkable in appearance     Lungs/pleura: Lung volumes are low and there are some dependent  atelectasis and vascular crowding. Small right-sided pleural effusion is  noted. Calcified granuloma again noted at the right base. Increased  groundglass opacity in a perihilar distribution noted bilaterally. Trace  amount of pleural fluid noted at the left base.     Upper Abdomen: Reflux of contrast noted into the hepatic veins Limited  images of the upper abdomen are otherwise unremarkable.     Soft tissues/Bones: Remote nonunited fractures left-sided ribs  posteriorly and inferiorly. No acute osseous abnormality.       Impression:         1. Study limited by motion. No evidence of a central pulmonary embolus.  More distal vessels are indeterminate  2. Low lung volume exam with dependent opacities likely representing  atelectasis. Mild increased groundglass opacity in a perihilar  distribution favored to represent mild pulmonary edema. Pneumonia cannot  be excluded.  3. Mild cardiomegaly and right heart dysfunction              Electronically Signed By-Reese Baeza On:9/15/2022 7:58 AM  This report was finalized on [id number] by  Reese Baeza, .    XR Chest 1 View [[id number]] Collected: 09/15/22 0643     Updated: 09/15/22 0647    Narrative:         DATE OF EXAM:   9/15/2022 4:50 AM     PROCEDURE:   XR CHEST 1 VW-     INDICATIONS:   chest pain     COMPARISON:  1/5/2022 and prior     TECHNIQUE:    Portable Chest     FINDINGS:      Study limited by overlying support and monitoring apparatus. There is  stable cardiomegaly. Pulmonary vascularity is mildly congested without  overt pulmonary edema. Lungs are grossly clear. Osseous structures are  unremarkable        Impression:      IMPRESSION :      1. Stable cardiomegaly with mild pulmonary vascular congestion.  2. No focal consolidation or overt pulmonary edema[     Electronically Signed By-Reese Baeza On:9/15/2022 6:45 AM  This report was finalized on [id number] by  Reese Baeza, .           Lab Results (last 24 hours)     Procedure Component Value Units Date/Time    Troponin [[id number]]  (Normal) Collected: 09/15/22 1137    Specimen: Blood Updated: 09/15/22 1217     Troponin T <0.010 ng/mL     Narrative:      Troponin T Reference Range:  <= 0.03 ng/mL-   Negative for AMI  >0.03 ng/mL-     Abnormal for myocardial necrosis.  Clinicians would have to utilize clinical acumen, EKG, Troponin and serial changes to determine if it is an Acute Myocardial Infarction or myocardial injury due to an underlying chronic condition.       Results may be falsely decreased if patient taking Biotin.      Extra Tubes [[id number]] Collected: 09/15/22 0544    Specimen: Blood, Venous Line Updated: 09/15/22 0648    Narrative:      The following orders were created for panel order Extra Tubes.  Procedure                               Abnormality         Status                     ---------                               -----------         ------                     Light Blue Top[[id number]]                                   Final result                 Please view results for these tests on the individual orders.    Light Blue Top [[id number]] Collected: 09/15/22 0544    Specimen: Blood Updated: 09/15/22 0648     Extra Tube Hold for add-ons.     Comment: Auto resulted       Basic Metabolic Panel [[id number]]  (Abnormal) Collected: 09/15/22 0544    Specimen: Blood Updated:  09/15/22 0614     Glucose 122 mg/dL      BUN 18 mg/dL      Creatinine 0.84 mg/dL      Sodium 137 mmol/L      Potassium 3.5 mmol/L      Chloride 102 mmol/L      CO2 23.0 mmol/L      Calcium 9.1 mg/dL      BUN/Creatinine Ratio 21.4     Anion Gap 12.0 mmol/L      eGFR 74.9 mL/min/1.73      Comment: National Kidney Foundation and American Society of Nephrology (ASN) Task Force recommended calculation based on the Chronic Kidney Disease Epidemiology Collaboration (CKD-EPI) equation refit without adjustment for race.       Narrative:      GFR Normal >60  Chronic Kidney Disease <60  Kidney Failure <15      Troponin [[id number]]  (Normal) Collected: 09/15/22 0544    Specimen: Blood Updated: 09/15/22 0614     Troponin T <0.010 ng/mL     Narrative:      Troponin T Reference Range:  <= 0.03 ng/mL-   Negative for AMI  >0.03 ng/mL-     Abnormal for myocardial necrosis.  Clinicians would have to utilize clinical acumen, EKG, Troponin and serial changes to determine if it is an Acute Myocardial Infarction or myocardial injury due to an underlying chronic condition.       Results may be falsely decreased if patient taking Biotin.      BNP [[id number]]  (Abnormal) Collected: 09/15/22 0544    Specimen: Blood Updated: 09/15/22 0612     proBNP 7,155.0 pg/mL     Narrative:      Among patients with dyspnea, NT-proBNP is highly sensitive for the detection of acute congestive heart failure. In addition NT-proBNP of <300 pg/ml effectively rules out acute congestive heart failure with 99% negative predictive value.    Results may be falsely decreased if patient taking Biotin.      D-dimer, Quantitative [[id number]]  (Abnormal) Collected: 09/15/22 0511    Specimen: Blood Updated: 09/15/22 0559     D-Dimer, Quantitative 1.70 mg/L (FEU)     Narrative:      Reference Range  --------------------------------------------------------------------     < 0.50   Negative Predictive Value  0.50-0.59   Indeterminate    >= 0.60   Probable VTE             A  very low percentage of patients with DVT may yield D-Dimer results   below the cut-off of 0.50 mg/L FEU.  This is known to be more   prevalent in patients with distal DVT.             Results of this test should always be interpreted in conjunction with   the patient's medical history, clinical presentation and other   findings.  Clinical diagnosis should not be based on the result of   IN[place]OV[place] D-Dimer alone.    Protime-INR [[id number]]  (Normal) Collected: 09/15/22 0511    Specimen: Blood Updated: 09/15/22 0559     Protime 10.7 Seconds      INR 1.04    aPTT [[id number]]  (Abnormal) Collected: 09/15/22 0511    Specimen: Blood Updated: 09/15/22 0559     PTT <20.0 seconds     CBC & Differential [[id number]]  (Abnormal) Collected: 09/15/22 0511    Specimen: Blood Updated: 09/15/22 0551    Narrative:      The following orders were created for panel order CBC & Differential.  Procedure                               Abnormality         Status                     ---------                               -----------         ------                     CBC Auto Differential[[id number]]        Abnormal            Final result               Scan Slide[[id number]]                                                                    Please view results for these tests on the individual orders.    CBC Auto Differential [[id number]]  (Abnormal) Collected: 09/15/22 0544    Specimen: Blood Updated: 09/15/22 0551     WBC 12.00 10*3/mm3      RBC 3.91 10*6/mm3      Hemoglobin 11.3 g/dL      Hematocrit 36.2 %      MCV 92.6 fL      MCH 28.9 pg      MCHC 31.2 g/dL      RDW 14.2 %      RDW-SD 45.9 fl      MPV 8.1 fL      Platelets 296 10*3/mm3      Neutrophil % 83.7 %      Lymphocyte % 6.5 %      Monocyte % 9.1 %      Eosinophil % 0.2 %      Basophil % 0.5 %      Neutrophils, Absolute 10.10 10*3/mm3      Lymphocytes, Absolute 0.80 10*3/mm3      Monocytes, Absolute 1.10 10*3/mm3      Eosinophils, Absolute 0.00 10*3/mm3      Basophils, Absolute 0.10  10*3/mm3      nRBC 0.0 /100 WBC            I reviewed the patient's new clinical results.    Assessment & Plan       Chest pain, unspecified type    Pulmonary HTN-  Continue revatio, consult Dr Langley who she sees as outpatient. Solumedrol IV 40 mg- pt with diminished bs  , bronchodilators.     Cardiomegaly -  [facility]IE in jan 2022 with 60% ef significant PH with pulm artery pressure of 70 mmHg, significant tricuspid regurgitation  and no PFO     Pulmonary Edema- mild on CT chest , with elevated BNP- 20 mg lasix IV     Recurrent endogenous depression - paxil   HLD- zocor  Gerd- ppi   HTN - on lisinopril at home - bp low here will hold lisinopril and monitor.     PPI for stress ulcer prophylaxis  Lovenox for DVT prevention   regular diet.       I discussed the patients findings and my recommendations with patient.       Addendum- at 1221 I was made aware that the pt was being intubated in the ER. As I walked into room 28 , intubation and chest compressions where in progress. ACLS protocol meds  was also in progress. The pt regained pulse and will be transferred to the ICU under the care of intensivists . Thank you.    Monique Hackett, APRN  09/15/22  12:36 EDT

## 2022-09-15 NOTE — PROCEDURES
Intubation    Date/Time: 9/15/2022 12:27 PM  Performed by: Gokul Castillo APRN  Authorized by: Gokul Castillo APRN   Consent: The procedure was performed in an emergent situation.  Risks and benefits discussed: Patient in active cardiopulmonary arrest, unable to discuss with family.  Required items: required blood products, implants, devices, and special equipment available  Time out called: Deferred, emergent need for intervention, patient in active cardiopulmonary arrest.  Indications: respiratory failure and  hypoxemia  Intubation method: GlideScope.  Patient status: unconscious  Preoxygenation: BVM  Pretreatment medications: none  Paralytic: none  Laryngoscope size: Mac 3  Tube size: 7.5 mm  Tube type: cuffed  Number of attempts: 1  Cricoid pressure: no  Cords visualized: yes  Post-procedure assessment: chest rise and CO2 detector  Breath sounds: equal and absent over the epigastrium  Cuff inflated: yes  ETT to lip: 24 cm  Tube secured with: ETT faustin  Chest x-ray interpreted by: Patient continued with CPR, and planned placement of central line, will obtain chest x-ray when stable.  Good breath sounds throughout all lung fields.  Patient tolerance: patient tolerated the procedure well with no immediate complications        Electronically signed by LAURENCE Ryan, 09/15/22, 12:34 PM EDT.

## 2022-09-15 NOTE — ED PROVIDER NOTES
Subjective   History of Present Illness  Chief complaint: Chest pain    72-year-old female with a history of pulmonary hypertension presents with chest pain.  Patient states pain started yesterday and has gotten progressively worse.  She describes it as a pressure in the center of her chest that radiates to her shoulders.  She has had associated shortness of breath.  She denies any diaphoresis, dizziness, palpitations, nausea.  She denies any alleviating or exacerbating factors.  Pain is described as moderate in intensity.  Review of records shows patient had a heart catheterization in June of this year that showed normal coronaries.    History provided by:  Patient      Review of Systems   Constitutional: Negative for fever.   HENT: Negative for congestion and sore throat.    Eyes: Negative for redness.   Respiratory: Positive for shortness of breath. Negative for cough.    Cardiovascular: Positive for chest pain.   Gastrointestinal: Negative for abdominal pain, diarrhea and vomiting.   Genitourinary: Negative for dysuria.   Musculoskeletal: Negative for back pain.   Skin: Negative for rash.   Neurological: Negative for dizziness and headaches.   Psychiatric/Behavioral: Negative for confusion.       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) 8/5/2022   • Right ventricular dilation 1/5/2022    Added automatically from request for surgery [id number]       No Known Allergies    Past Surgical History:   Procedure Laterality Date   •  "BLEPHAROPLASTY Bilateral 3/2/2021    Procedure: BILATERAL UPPER LID BLEPHAROPLASY;  Surgeon: Stanley Fulton MD;  Location:  [facility];  Service: Ophthalmology;  Laterality: Bilateral;   • BROW LIFT Bilateral 9/23/2021    Procedure: BILATERAL TEMPORAL DIRECT BROW LIFT, LEFT UPPER LID MULLERECTOMY;  Surgeon: Stanley Fulton MD;  Location:  [facility];  Service: Ophthalmology;  Laterality: Bilateral;   • BUNIONECTOMY Right    • CARDIAC CATHETERIZATION N/A 1/7/2022    Procedure: Left Heart Cath and coronary angiogram;  Surgeon: Chase Paige MD;  Location:  [facility];  Service: Cardiovascular;  Laterality: N/A;   • CARDIAC CATHETERIZATION N/A 1/7/2022    Procedure: Right Heart Cath;  Surgeon: Cahse Paige MD;  Location:  [facility];  Service: Cardiovascular;  Laterality: N/A;   • COLONOSCOPY     • ENDOSCOPY     • FACIAL COSMETIC SURGERY  06/2021   • HYSTERECTOMY  1990'S   • KNEE ARTHROPLASTY Bilateral 2013 2017   • LUMBAR DISC SURGERY  1990'S   • TONSILLECTOMY         Family History   Problem Relation Age of Onset   • Heart disease Mother    • Cancer Father    • Malig Hyperthermia Neg Hx        Social History     Socioeconomic History   • Marital status:    Tobacco Use   • Smoking status: Never Smoker   • Smokeless tobacco: Never Used   Vaping Use   • Vaping Use: Never used   Substance and Sexual Activity   • Alcohol use: Yes     Comment: SOCIAL USE   • Drug use: Never   • Sexual activity: Defer       /78   Pulse (!) 35   Temp 97.7 °F (36.5 °C) (Oral)   Resp 17   Ht 162.6 cm (64\")   Wt 65.8 kg (145 lb)   SpO2 (!) 69%   BMI 24.89 kg/m²       Objective   Physical Exam  Vitals and nursing note reviewed.   Constitutional:       Appearance: She is well-developed.   HENT:      Head: Normocephalic and atraumatic.   Eyes:      Pupils: Pupils are equal, round, and reactive to light.   Cardiovascular:      Rate and Rhythm: Normal rate and regular " rhythm.      Heart sounds: Normal heart sounds.   Pulmonary:      Effort: Pulmonary effort is normal. No respiratory distress.      Breath sounds: Normal breath sounds.   Abdominal:      General: Bowel sounds are normal.      Palpations: Abdomen is soft.      Tenderness: There is no abdominal tenderness.   Musculoskeletal:         General: Normal range of motion.      Cervical back: Normal range of motion and neck supple.   Skin:     General: Skin is warm and dry.   Neurological:      General: No focal deficit present.      Mental Status: She is alert and oriented to person, place, and time.         Procedures           ED Course  ED Course as of 09/17/22 1610   Thu Sep 15, 2022   1235 Patient was noted to arrest while in the ER awaiting admission bed. See nursing documentation. ACLS protocol initiated and intensivist was paged. Family at bedside.  [JW]      ED Course User Index  [JW] Beti Montana APRN      My interpretation of EKG shows sinus tachycardia, rate of 102, no ST elevation     Results for orders placed or performed during the hospital encounter of 09/15/22   Basic Metabolic Panel    Specimen: Blood   Result Value Ref Range    Glucose 122 (H) 65 - 99 mg/dL    BUN 18 8 - 23 mg/dL    Creatinine 0.84 0.57 - 1.00 mg/dL    Sodium 137 136 - 145 mmol/L    Potassium 3.5 3.5 - 5.2 mmol/L    Chloride 102 98 - 107 mmol/L    CO2 23.0 22.0 - 29.0 mmol/L    Calcium 9.1 8.6 - 10.5 mg/dL    BUN/Creatinine Ratio 21.4 7.0 - 25.0    Anion Gap 12.0 5.0 - 15.0 mmol/L    eGFR 74.9 >60.0 mL/min/1.73   Protime-INR    Specimen: Blood   Result Value Ref Range    Protime 10.7 9.6 - 11.7 Seconds    INR 1.04 0.93 - 1.10   aPTT    Specimen: Blood   Result Value Ref Range    PTT <20.0 (L) 61.0 - 76.5 seconds   Troponin    Specimen: Blood   Result Value Ref Range    Troponin T <0.010 0.000 - 0.030 ng/mL   D-dimer, Quantitative    Specimen: Blood   Result Value Ref Range    D-Dimer, Quantitative 1.70 (H) 0.00 - 0.59 mg/L (FEU)    BNP    Specimen: Blood   Result Value Ref Range    proBNP 7,155.0 (H) 0.0 - 900.0 pg/mL   CBC Auto Differential    Specimen: Blood   Result Value Ref Range    WBC 12.00 (H) 3.40 - 10.80 10*3/mm3    RBC 3.91 3.77 - 5.28 10*6/mm3    Hemoglobin 11.3 (L) 12.0 - 15.9 g/dL    Hematocrit 36.2 34.0 - 46.6 %    MCV 92.6 79.0 - 97.0 fL    MCH 28.9 26.6 - 33.0 pg    MCHC 31.2 (L) 31.5 - 35.7 g/dL    RDW 14.2 12.3 - 15.4 %    RDW-SD 45.9 37.0 - 54.0 fl    MPV 8.1 6.0 - 12.0 fL    Platelets 296 140 - 450 10*3/mm3    Neutrophil % 83.7 (H) 42.7 - 76.0 %    Lymphocyte % 6.5 (L) 19.6 - 45.3 %    Monocyte % 9.1 5.0 - 12.0 %    Eosinophil % 0.2 (L) 0.3 - 6.2 %    Basophil % 0.5 0.0 - 1.5 %    Neutrophils, Absolute 10.10 (H) 1.70 - 7.00 10*3/mm3    Lymphocytes, Absolute 0.80 0.70 - 3.10 10*3/mm3    Monocytes, Absolute 1.10 (H) 0.10 - 0.90 10*3/mm3    Eosinophils, Absolute 0.00 0.00 - 0.40 10*3/mm3    Basophils, Absolute 0.10 0.00 - 0.20 10*3/mm3    nRBC 0.0 0.0 - 0.2 /100 WBC   Troponin    Specimen: Blood   Result Value Ref Range    Troponin T <0.010 0.000 - 0.030 ng/mL   Blood Gas, Arterial -    Specimen: Arterial Blood   Result Value Ref Range    Site Left Radial     Jaswant's Test Positive     pH, Arterial 6.883 (C) 7.350 - 7.450 pH units    pCO2, Arterial 68.7 (H) 35.0 - 48.0 mm Hg    pO2, Arterial 293.7 (H) 83.0 - 108.0 mm Hg    HCO3, Arterial 12.9 (L) 21.0 - 28.0 mmol/L    Base Excess, Arterial -21.5 (L) 0.0 - 3.0 mmol/L    O2 Saturation, Arterial 99.5 (H) 94.0 - 98.0 %    Barometric Pressure for Blood Gas      Modality Adult Vent     FIO2 100 %    Ventilator Mode ;AC     Set Tidal Volume 450     PEEP 5     Hemodilution No     Respiratory Rate 18    CK Total & CKMB    Specimen: Blood   Result Value Ref Range    CKMB 1.53 <=5.30 ng/mL    Creatine Kinase 32 20 - 180 U/L   POCT Electrolytes +HGB +HCT    Specimen: Blood   Result Value Ref Range    Sodium 144 138 - 146 mmol/L    POC Potassium 3.6 3.5 - 4.5 mmol/L    Ionized Calcium  1.23 1.15 - 1.33 mmol/L    Glucose 370 (H) 74 - 100 mg/dL    Hematocrit 48 38 - 51 %    Hemoglobin 16.3 12.0 - 17.0 g/dL   POC Lactate    Specimen: Blood   Result Value Ref Range    Lactate 17.4 (C) 0.5 - 2.0 mmol/L   POC Glucose Once    Specimen: Blood   Result Value Ref Range    Glucose 370 (H) 74 - 100 mg/dL   ECG 12 Lead   Result Value Ref Range    QT Interval 329 ms   ECG 12 Lead   Result Value Ref Range    QT Interval 246 ms   Adult Transthoracic Echo Complete W/ Cont if Necessary Per Protocol   Result Value Ref Range    Target HR (85%) 128 bpm    Max. Pred. HR (100%) 150 bpm    ACS 1.92 cm    Ao root diam 3.2 cm    Ao pk virginia 103.3 cm/sec    Ao V2 VTI 14.3 cm    NISHA(I,D) 1.92 cm2    EDV(cubed) 14.9 ml    EDV(MOD-sp4) 16.9 ml    EF(MOD-bp) 59.0 %    EF(MOD-sp4) 49.2 %    ESV(cubed) 7.1 ml    ESV(MOD-sp4) 8.6 ml    IVS/LVPW 1.27 cm    LV mass(C)d 86.0 grams    LV V1 max PG 2.29 mmHg    LV V1 mean PG 1.08 mmHg    LV V1 max 75.6 cm/sec    LVPWd 1.07 cm    MV dec slope 326.3 cm/sec2    MV dec time 0.15 msec    MV V2 VTI 15.4 cm    MVA(VTI) 1.79 cm2    PA acc time 0.03 sec    PA pr(Accel) 66.5 mmHg    PA V2 max 59.8 cm/sec    RAP systole 15.0 mmHg    RV V1 max PG 0.88 mmHg    RV V1 max 46.8 cm/sec    RV V1 VTI 7.7 cm    RVIDd 4.0 cm    RVSP(TR) 67.2 mmHg    SI(MOD-sp4) 4.9 ml/m2    SV(LVOT) 27.5 ml    SV(MOD-sp4) 8.3 ml    TR max PG 52.2 mmHg    Ao max PG 4.3 mmHg    Ao mean PG 2.6 mmHg    FS 22.1 %    IVSd 1.37 cm    LV V1 VTI 10.8 cm    LVIDd 2.46 cm    LVIDs 1.92 cm    LVOT area 2.6 cm2    LVOT diam 1.80 cm    MV E/A 0.94     MV max PG 1.49 mmHg    MV mean PG 0.56 mmHg    MV A max virginia 51.1 cm/sec    MV E max virginia 48.0 cm/sec    TR max virginia 359.5 cm/sec    LV Murillo Vol (BSA corrected) 9.9 cm2    LV Sys Vol (BSA corrected) 5.0 cm2    LA length (A2C) 2.5 cm    Echo EF Estimated 60 %   Light Blue Top   Result Value Ref Range    Extra Tube Hold for add-ons.      No radiology results for the last day                                   MDM   Patient had the above evaluation.  Results were discussed with the patient.  Chest x-ray shows stable cardiomegaly with mild pulmonary vascular congestion.  EKG shows no acute ischemia.  Troponin is negative.  BNP is elevated at 7000.  D-dimer is also elevated at 1.7.  CT PE protocol is pending at this time.  White blood cell count is 12,000.  BMP is unremarkable.  Patient had a heart catheterization and January of this year that showed normal coronaries.  That is when she was diagnosed with pulmonary hypertension.  Patient continues to complain of some chest discomfort and shortness of breath.  Oxygen saturations did drop into the upper 80s on room air and she was placed on nasal cannula.  I discussed with the nurse practitioner on-call for the primary doctor and the patient will be admitted for further evaluation and management.      Final diagnoses:   Chest pain, unspecified type   Dyspnea, unspecified type   Pulmonary hypertension (HCC)       ED Disposition  ED Disposition     ED Disposition       Condition   --    Comment   --             No follow-up provider specified.       Medication List      No changes were made to your prescriptions during this visit.          Jorge Chirinos MD  09/15/22 0254       Jorge Chirinos MD  22 2068

## 2022-09-15 NOTE — ED TRIAGE NOTES
Pt arrived via PV to triage c/o chest pressure and SOA that started 09/14/22 but has increased since. Pt is winded at triage and unable to speak in complete sentences.

## 2022-09-15 NOTE — DISCHARGE SUMMARY
Date of Death:  9/15/2022  Cause of Death: suspect cardiac event with hx Pulmonary HTN and acute pulmonary edema in the setting of a new cardiac murmur  Final Diagnosis:   PH   Scleroderma   Pulmonary edema   Recurrent endogenous depression   HLD  GERD  HTN  Cardiomegaly     Presenting Problem/History of Present Illness  Active Hospital Problems    Diagnosis  POA   • Chest pain, unspecified type [R07.9]  Yes   • Cardiopulmonary arrest with successful resuscitation (HCC) [I46.9]  Unknown      Resolved Hospital Problems   No resolved problems to display.         Hospital Course  Patient was a 72 y.o. female presented with   Patient is a 72 y.o. female presents with chest pain . Onset of symptoms was yesterday .  ER found cxr with cardiomegaly and mild pulmonary congestion. EKG with no ischemia and negative troponin. BNP was elevated at 7000 and D dimer was up at 1.7. CT PE protocol with no PE . She had a cardiac cath in 2022 with normal coronary arteries. She was  admitted for further management. Orders where put in for IV lasix for pulmonary edema and IV solumedrol for PH with diminished BS. Echo was ordered as the pt had a loud MG . She did get the IV steroid and Lasix in the ER. While in the ER waiting for a bed, I was called to be informed that she was being intubated. I went to ER bed 28 and found the pt in full arrest with chest compressions  And ACLS protocol in place. She was successfully intubated and regained a pulse. Later, I received a call form Edvin RAMIREZ with with intensivist group to inform me that the pt's family decided to make her comfort measures. About 1430 , I received a call that Mrs. Yin had .   See See Edvin Castillo NP note for details regarding the CODE event.      Procedures Performed         Consults:   Consults     Date and Time Order Name Status Description    9/15/2022 12:37 PM Inpatient Cardiology Consult      9/15/2022  8:45 AM Inpatient Pulmonology Consult Completed      9/15/2022  7:22 AM Family Medicine Consult                Monique Hackett, LAURENCE  09/15/22  14:58 EDT

## 2022-09-15 NOTE — SIGNIFICANT NOTE
Initially consulted for emergent need for intubation, as patient was holding in the ED as a floor patient.  It was reported that patient was found in bed unresponsive, as she had just recently went from the bedside commode back to the bed.  Patient was interiano, and without pulse.  It was at this time, as this nurse practitioner was in transit, that CODE BLUE was initiated, with compressions, bag-valve-mask ventilation, and ACLS medications initiated.  ED providers were already at bedside and ordering appropriate medications as documented in code documentation.  This nurse practitioner assumed responsibility of the cardiac arrest and resuscitation attempt at that time, and successfully intubated patient on first attempt.  See intubation note.  Return of spontaneous circulation was obtained, and patient after review of chart, was deemed an appropriate patient to be considered for therapeutic temperature maintenance protocol/thermia protocol.  This was discussed with patient's family, who initially agreed to proceed with this after discussion with this nurse practitioner, but they had requested for patient to be transitioned to no CPR with full interventions.  They were educated on the high potential that patient could repeat cardiac arrest if undergoing this protocol, but after acknowledgment of this, it was still planned to proceed with TTM.  Case was discussed with LAURENCE Spangler of the admitting service, in which chart was reviewed, in which it was most speculated that patient likely had an episode of flash pulmonary edema, in patient with known severe pulmonary hypertension, new onset cardiac murmur, which resulted in hypoxia and cardiopulmonary arrest.  Case was also discussed with consulting pulmonology physician, Dr. Rice, in which he also recommended proceeding with TTM.  He had informed me that this cardiac murmur was new in comparison to his most recent office visit, and he was awaiting the echocardiogram  that had previously been ordered.  He had also informed me that he patient was in process of trying to be approved by her insurance company to start a new pulmonary hypertension regimen.  He additionally reported that he had just been to see this patient and patient had remained incredibly stable, and was surprised at the quick decompensation of patient.  Following intubation and resuscitation, ABG was obtained as well as stat labs ordered.  Patient was with severe mixed metabolic and respiratory acidosis, and sodium bicarbonate IV push was ordered with a bicarb drip ordered to be initiated following.  While setting up to place arterial line, patient's family requested for discussion prior to proceeding with TTM in which they were  expressing reservation due to patient's worsening quality of life.  Patient's spouse reported that patient has been rather miserable with this new finding of hypertension with her being very short of breath when just walking across the room.  He reported that he is unsure if he would want to proceed with this protocol without knowing whether patient would have a positive outcome.  It was informed to him that unfortunately with neurologic injuries, time is the only thing that can rush us any information unless if there is already a rather severe finding on the original CT of the head that had been ordered, but not yet completed.  He discussed further with his family, and after further discussion, it was ultimately decided that they would not want to proceed with this, as patient had stated to them that she would not want to undergo these aggressive measures.  Decision was made to contact family and leave patient on the ventilator without sedation, and once family present, they would proceed with removal of artificial means of life support and proceed with comfort measures.  Comfort measures protocols were ordered, emotional support was provided to patient's family, and   additionally was called for emotional support.  This was additionally discussed with the primary team as well as pulmonary physician, of which all were in agreement of respecting the family's wish.  Patient's spouse was very supported by his family and all were in agreement with proceeding with comfort measures.  All questions were answered to family satisfaction, and this was also discussed with nursing.  Case was also discussed with attending intensivist physician, in which it was felt that all appropriate treatment decisions were indicated based on patient's family expression of wishes.      Electronically signed by LAURENCE Ryan, 09/15/22, 1:23 PM EDT.

## 2022-09-15 NOTE — CONSULTS
[facility]: [facility]         CONSULT NOTE    Patient Identification:  Natalie Yin  72 y.o.  female  1950  [id number]            Requesting physician: Attending physician    Reason for Consultation: Pulm hypertension      History of Present Illness:  72-year-old female with history of pulmonary hypertension group 1 associated with scleroderma who was seen last in the office in August 2022 and was prescribed Revatio and bosentan, her usual functional status is NYHA II and her right heart catheter mean pulmonary artery pressure was 31 [68/18].  She presented with chest pain described as pressure in the center of the chest with radiation to the shoulders and associated with shortness of breath but no fever or cough.  She had left heart catheter in January 2022 that showed normal coronary arteries.  In ED temperature 97.7, pulse 103, respiration 22, blood pressure 107/58, oxygen saturation 93% on room air which improved to 98% on 2 L per nasal cannula.  D-dimer was elevated to 1.7 but chest CTA showed no PE and no lung infiltrates.  WBCs 12 hemoglobin 11.3  Troponin less than 0.01  proBNP 7155  EKG showed sinus tachycardia and right axis deviation        Assessment:  <principal problem not specified>  Chest pain  Pulmonary artery hypertension Group 1 associated with scleroderma  Dyslipidemia  Anxiety disorder  New heart murmur    Recommendations:  Oxygen supplement and titration maintain saturation 90 to 95%  Serial EKG and cardiac enzymes to rule out MI  Diuresis: Furosemide  IV steroids  Bronchodilators    Addendum:  After I saw the patient she was improving and was stable however all of a sudden she became unresponsive and had cardiac arrest and responded to resuscitation and got intubated.  I saw her after intubation and the patient was having a new bilateral wet crackles and pink frothy secretions compatible with acute pulmonary edema.  I discussed the case with the ICU NP, the patient will be  transferred to ICU for further management          Review of Sytems:  Review of Systems  Constitutional: Negative for chills, fever and malaise/fatigue.   HENT: Negative.    Eyes: Negative.    Cardiovascular: Positive for chest pain  Respiratory: Positive for mild shortness of breath.    Skin: Negative.    Musculoskeletal: Negative.    Gastrointestinal: Negative.    Genitourinary: Negative.    Neurological: Negative.    Psychiatric/Behavioral: Negative.  Past Medical History:  Past Medical History:   Diagnosis Date   • Acute systolic congestive heart failure (HCC) 1/5/2022    Added automatically from request for surgery [id number]   • Anxiety and depression    • Arthritis    • DDD (degenerative disc disease), lumbar    • Diverticulosis    • GERD (gastroesophageal reflux disease)    • Hyperlipidemia    • PONV (postoperative nausea and vomiting)    • Pulmonary hypertension (HCC) 1/5/2022    Added automatically from request for surgery [id number]   • Pulmonary hypertension secondary to scleroderma (HCC) 8/5/2022   • Right ventricular dilation 1/5/2022    Added automatically from request for surgery [id number]       Past Surgical History:  Past Surgical History:   Procedure Laterality Date   • BLEPHAROPLASTY Bilateral 3/2/2021    Procedure: BILATERAL UPPER LID BLEPHAROPLASY;  Surgeon: Stanley Fulton MD;  [place] [facility] [place] [facility] [place]  Service: Ophthalmology;  Laterality: Bilateral;   • BROW LIFT Bilateral 9/23/2021    Procedure: BILATERAL TEMPORAL DIRECT BROW LIFT, LEFT UPPER LID MULLERECTOMY;  Surgeon: Stanley Fulton MD;  [place] [facility] [place] [facility] [place]  Service: Ophthalmology;  Laterality: Bilateral;   • BUNIONECTOMY Right    • CARDIAC CATHETERIZATION N/A 1/7/2022    Procedure: Left Heart Cath and coronary angiogram;  Surgeon: Chase Paige MD;  Location: [facility] INVASIVE LOCATION;  Service: Cardiovascular;  Laterality: N/A;   • CARDIAC CATHETERIZATION N/A 1/7/2022    Procedure: Right Heart Cath;  Surgeon: Grecia  "MD Chase;  Location: [facility] INVASIVE LOCATION;  Service: Cardiovascular;  Laterality: N/A;   • COLONOSCOPY     • ENDOSCOPY     • FACIAL COSMETIC SURGERY  06/2021   • HYSTERECTOMY  1990'S   • KNEE ARTHROPLASTY Bilateral 2013 2017   • LUMBAR DISC SURGERY  1990'S   • TONSILLECTOMY          Home Meds:  (Not in a hospital admission)      Allergies:  No Known Allergies    Social History:   Social History     Socioeconomic History   • Marital status:    Tobacco Use   • Smoking status: Never Smoker   • Smokeless tobacco: Never Used   Vaping Use   • Vaping Use: Never used   Substance and Sexual Activity   • Alcohol use: Yes     Comment: SOCIAL USE   • Drug use: Never   • Sexual activity: Defer       Family History:  Family History   Problem Relation Age of Onset   • Heart disease Mother    • Cancer Father    • Malig Hyperthermia Neg Hx        Physical Exam:  /67   Pulse 90   Temp 97.7 °F (36.5 °C) (Oral)   Resp 17   Ht 162.6 cm (64\")   Wt 65.8 kg (145 lb)   SpO2 98%   BMI 24.89 kg/m²  Body mass index is 24.89 kg/m². 98% 65.8 kg (145 lb)  Physical Exam  General Appearance:  Alert, appears no acute distress  HEENT:  Normocephalic, without obvious abnormality, Conjunctiva/corneas clear,.   Nares normal, no drainage     Neck:  Supple, symmetrical, trachea midline. No JVD.  Lungs /Chest wall: Minimal bilateral basal rhonchi, respirations unlabored, symmetrical wall movement.     Heart:  Regular rate and rhythm, S1 S2 normal, a new 2/6 systolic murmur at the left sternal border and apex  Abdomen: Soft, non-tender, no masses, no organomegaly.    Extremities: No edema, no clubbing or cyanosis  LABS:  Lab Results   Component Value Date    CALCIUM 9.1 09/15/2022     Results from last 7 days   Lab Units 09/15/22  0544   SODIUM mmol/L 137   POTASSIUM mmol/L 3.5   CHLORIDE mmol/L 102   CO2 mmol/L 23.0   BUN mg/dL 18   CREATININE mg/dL 0.84   GLUCOSE mg/dL 122*   CALCIUM mg/dL 9.1   WBC 10*3/mm3 12.00* "   HEMOGLOBIN g/dL 11.3*   PLATELETS 10*3/mm3 296   PROBNP pg/mL 7,155.0*     Lab Results   Component Value Date    TROPONINT <0.010 09/15/2022     Results from last 7 days   Lab Units 09/15/22  0544   TROPONIN T ng/mL <0.010                     Results from last 7 days   Lab Units 09/15/22  0511   INR  1.04         Lab Results   Component Value Date    TSH 2.810 01/06/2022     Estimated Creatinine Clearance: 58.1 mL/min (by C-G formula based on SCr of 0.84 mg/dL).         Imaging:  Imaging Results (Last 24 Hours)     Procedure Component Value Units Date/Time    CT Angiogram Chest Pulmonary Embolism [[id number]] Collected: 09/15/22 0747     Updated: 09/15/22 0800    Narrative:      CT ANGIOGRAM CHEST PULMONARY EMBOLISM-     Date of Exam: 9/15/2022 6:39 AM     Indication: chest pain, elevated d-dimer.     Comparison: 1/9/2022     Technique: Serial and axial CT images of the chest were obtained  following the uneventful intravenous administration of 100 mL Isovue-370  contrast. Reconstructions in the coronal and sagittal planes were also  performed. In addition, a 3 D volume rendered image was obtained after  post processing. Automated exposure control and iterative reconstruction  methods were used.       FINDINGS:     Pulmonary Arteries: Evaluation of pulmonary arteries is slightly limited  by motion. There is an adequate bolus.     Motion limitation is more prominent at the lung bases with limited  evaluation of the segmental and subsegmental levels. No evidence of a  central pulmonary sinuses noted. Main pulmonary arteries normal caliber.     Mediastinum: Heart size is mildly enlarged. This is accentuated by low  lung volumes. No significant pericardial effusion noted.     Mildly motion limited imaging of the aorta and origin of great vessels  demonstrate no acute abnormality. Mild underlying atherosclerotic  changes are noted. Minimal hilar adenopathy bilaterally likely reactive  noted.     Some small partially  calcified subcarinal lymph nodes are noted.     Limited imaging of the base of the neck, axilla and the esophagus are  grossly unremarkable in appearance     Lungs/pleura: Lung volumes are low and there are some dependent  atelectasis and vascular crowding. Small right-sided pleural effusion is  noted. Calcified granuloma again noted at the right base. Increased  groundglass opacity in a perihilar distribution noted bilaterally. Trace  amount of pleural fluid noted at the left base.     Upper Abdomen: Reflux of contrast noted into the hepatic veins Limited  images of the upper abdomen are otherwise unremarkable.     Soft tissues/Bones: Remote nonunited fractures left-sided ribs  posteriorly and inferiorly. No acute osseous abnormality.       Impression:         1. Study limited by motion. No evidence of a central pulmonary embolus.  More distal vessels are indeterminate  2. Low lung volume exam with dependent opacities likely representing  atelectasis. Mild increased groundglass opacity in a perihilar  distribution favored to represent mild pulmonary edema. Pneumonia cannot  be excluded.  3. Mild cardiomegaly and right heart dysfunction              Electronically Signed By-Reese Baeza On:9/15/2022 7:58 AM  This report was finalized on [id number] by  Reese Baeza, .    XR Chest 1 View [[id number]] Collected: 09/15/22 0643     Updated: 09/15/22 0647    Narrative:         DATE OF EXAM:   9/15/2022 4:50 AM     PROCEDURE:   XR CHEST 1 VW-     INDICATIONS:   chest pain     COMPARISON:  1/5/2022 and prior     TECHNIQUE:   Portable Chest     FINDINGS:      Study limited by overlying support and monitoring apparatus. There is  stable cardiomegaly. Pulmonary vascularity is mildly congested without  overt pulmonary edema. Lungs are grossly clear. Osseous structures are  unremarkable        Impression:      IMPRESSION :      1. Stable cardiomegaly with mild pulmonary vascular congestion.  2. No focal consolidation  or overt pulmonary edema[     Electronically Signed By-Reese Baeza On:9/15/2022 6:45 AM  This report was finalized on [id number] by  Reese Baeza, .            Current Meds:   SCHEDULE  ipratropium-albuterol, 3 mL, Nebulization, 4x Daily - RT  methylPREDNISolone sodium succinate, 40 mg, Intravenous, Q12H      Infusions     PRNs  [COMPLETED] Insert peripheral IV **AND** sodium chloride        Gosia Rice MD  9/15/2022  10:36 EDT      Much of this encounter note is an electronic transcription/translation of spoken language to printed text using Dragon Software.

## 2022-09-17 LAB — QT INTERVAL: 329 MS

## 2022-10-13 LAB — QT INTERVAL: 246 MS

## (undated) DEVICE — HDRST POSITIONING FM RND 2X9IN

## (undated) DEVICE — ELECTRD BLD EZ CLN MOD 2.5IN

## (undated) DEVICE — GLV SURG BIOGEL SENSR LTX PF SZ7.5

## (undated) DEVICE — GW DIAG EMERALD HEPCOAT MOVE JTIP STD .035 3MM 150CM

## (undated) DEVICE — CATH DIAG IMPULSE FR4 5F 100CM

## (undated) DEVICE — PK TRY HEART CATH 50

## (undated) DEVICE — CATH DIAG IMPULSE PIG 5F 100CM

## (undated) DEVICE — PINNACLE INTRODUCER SHEATH: Brand: PINNACLE

## (undated) DEVICE — PATIENT RETURN ELECTRODE, SINGLE-USE, CONTACT QUALITY MONITORING, ADULT, WITH 9FT CORD, FOR PATIENTS WEIGING OVER 33LBS. (15KG): Brand: MEGADYNE

## (undated) DEVICE — SWABSTK SKINPREP PVPI LF PK/3

## (undated) DEVICE — SUT GUT PLN FAST ABS 5/0 PC1 18IN 1915G

## (undated) DEVICE — CROUCH CORNEAL PROTECTOR: Brand: BAUSCH + LOMB

## (undated) DEVICE — SUT PROLN 5/0 P3 18IN 8698G

## (undated) DEVICE — WIPE INST MEROCEL

## (undated) DEVICE — STERILE COTTON TIP 6IN 10PK: Brand: MEDLINE

## (undated) DEVICE — PENCL E/S ULTRAVAC TELESCP NOSE HOLSTR 10FT

## (undated) DEVICE — PK ENT 40

## (undated) DEVICE — NDL HYPO PRECISIONGLIDE REG 25G 1 1/2

## (undated) DEVICE — TRAP FLD MINIVAC MEGADYNE 100ML

## (undated) DEVICE — RADIFOCUS OBTURATOR: Brand: RADIFOCUS

## (undated) DEVICE — CATH DIAG IMPULSE FL4 5F 100CM

## (undated) DEVICE — SWAN-GANZ TRUE SIZE THERMODILUTION "S" TIP: Brand: SWAN-GANZ TRUE SIZE

## (undated) DEVICE — IMMOB HD UNIV CLR DISP

## (undated) DEVICE — ELECTRD NDL EZ CLN MOD 2.75IN

## (undated) DEVICE — GW FC J TFE/COAT .025 3MM 180CM

## (undated) DEVICE — SUT VIC 5/0 P3 18IN J493G

## (undated) DEVICE — STERILE TOOTHPICK SET: Brand: CENTURION

## (undated) DEVICE — INTENDED FOR TISSUE SEPARATION, AND OTHER PROCEDURES THAT REQUIRE A SHARP SURGICAL BLADE TO PUNCTURE OR CUT.: Brand: BARD-PARKER ® CARBON RIB-BACK BLADES